# Patient Record
Sex: FEMALE | Race: WHITE | NOT HISPANIC OR LATINO | Employment: FULL TIME | URBAN - METROPOLITAN AREA
[De-identification: names, ages, dates, MRNs, and addresses within clinical notes are randomized per-mention and may not be internally consistent; named-entity substitution may affect disease eponyms.]

---

## 2017-10-08 ENCOUNTER — GENERIC CONVERSION - ENCOUNTER (OUTPATIENT)
Dept: OTHER | Facility: OTHER | Age: 52
End: 2017-10-08

## 2018-01-10 NOTE — PROGRESS NOTES
Assessment    1  Dysuria (788 1) (R30 0)    Plan  Dysuria    · Ciprofloxacin HCl - 500 MG Oral Tablet; TAKE 1 TABLET TWICE DAILY   · Phenazopyridine HCl - 200 MG Oral Tablet; Take 1 three times daily as needed for  burning    Discussion/Summary    Archie Banks has dysuria and symptoms consistent with UTI  I have her cipro for 3 days and pyridium for the discomfort  She is to follow up with her PCP or go to urgent care if her sx do not improve or worsen  Possible side effects of new medications were reviewed with the patient/guardian today  The treatment plan was reviewed with the patient/guardian  The patient/guardian understands and agrees with the treatment plan     Follow Up with your Primary Care Provider in 3 days  If your symptoms worsen follow up at the nearest [facility] Emergency Room  History of Present Illness  Archie Banks has been experiencing dysuria, urgency and frequency for the past several hours  She is working and will not be able to be seen at her PCP, she is quite uncomfortable  She states this presentation of symptoms is consistent with past UTI's  She denies fever, chills, nausea, vomiting, hematuria  No flank pain  Review of Systems    Constitutional: No fever, no chills, feels well, no tiredness, no recent weight gain or loss  Cardiovascular: no complaints of slow or fast heart rate, no chest pain, no palpitations, no leg claudication or lower extremity edema  Respiratory: no complaints of shortness of breath, no wheezing, no dyspnea on exertion, no orthopnea or PND  Gastrointestinal: no complaints of abdominal pain, no constipation, no nausea or diarrhea, no vomiting, no bloody stools  Genitourinary: as noted in HPI  Musculoskeletal: no complaints of arthralgia, no myalgia, no joint swelling or stiffness, no limb pain or swelling  Active Problems    1  Acute bronchitis (466 0) (J20 9)   2  Cough (786 2) (R05)    Past Medical History    1   Acute sinusitis (461 9) (J01 90)   2  History of urinary tract infection (V13 02) (Z87 440)   3  No pertinent past medical history    Social History    · Never a smoker    Surgical History    1  Denied: History Of Prior Surgery    Current Meds   1  Levofloxacin 750 MG Oral Tablet; TAKE 1 TABLET ONCE DAILY; Therapy: [id number] to (Evaluate:13Jan2016)  Requested for: [id number]; Last   Rx:06Jan2016 Ordered   2  Ventolin  (90 Base) MCG/ACT Inhalation Aerosol Solution; INHALE 2 PUFFS   EVERY 4 HOURS AS NEEDED; Therapy: [id number] to (Last Rx:06Jan2016)  Requested for: [id number] Ordered    Allergies    1  No Known Drug Allergies    Observations Made  NAD, No CVAT with self percussion        Signatures   Electronically signed by : Emily Cisneros DO; Aug 26 2016  4:10PM EST                       (Author)

## 2018-01-16 NOTE — PROGRESS NOTES
Assessment    1  Acute UTI (599 0) (N39 0)    Plan  Acute UTI    · Ciprofloxacin HCl - 250 MG Oral Tablet; TAKE 1 TABLET EVERY 12 HOURS DAILY   · Phenazopyridine HCl - 100 MG Oral Tablet; TAKE 1 TABLET 3 TIMES DAILY AFTER  MEALS    Discussion/Summary    I prescribed Cipro 250mg bid for 3 days, Pyridium 100mg TID for 3 days  i advised the patient to follow up with PCP if symptoms do not improve within 2 or 3 days  Chief Complaint  UTI      History of Present Illness  E-visit  Patient complaining of UTI symptoms since last night  Describes symptoms of urgency and dysuria  Denies any bladder pain over lower back pain  Denies any fever chills  This morning she took some over-the-counter azo tablet with minimal improvement  Review of Systems    Constitutional: No fever, no chills, feels well, no tiredness, no recent weight gain or loss  Genitourinary: dysuria  Active Problems    1  Acute bronchitis (466 0) (J20 9)   2  Cough (786 2) (R05)   3  Dysuria (788 1) (R30 0)    Past Medical History    1  Acute sinusitis (461 9) (J01 90)   2  History of urinary tract infection (V13 02) (Z87 440)   3  No pertinent past medical history    The active problems and past medical history were reviewed and updated today  Social History    · Never a smoker    Surgical History    1  Denied: History Of Prior Surgery    Current Meds   1  Ciprofloxacin HCl - 500 MG Oral Tablet; TAKE 1 TABLET TWICE DAILY; Therapy: 26Aug2016 to (Evaluate:29Aug2016)  Requested for: 26Aug2016; Last   Rx:26Aug2016 Ordered   2  LevoFLOXacin 750 MG Oral Tablet; TAKE 1 TABLET ONCE DAILY; Therapy: 34DYI8814 to (Evaluate:13Jan2016)  Requested for: 95VIN1884; Last   Rx:06Jan2016 Ordered   3  Phenazopyridine HCl - 200 MG Oral Tablet; Take 1 three times daily as needed for   burning; Therapy: 69Oya1888 to (Evaluate:30Aug2016)  Requested for: 24Tuo4298; Last   Rx:26Aug2016 Ordered   4   Ventolin  (90 Base) MCG/ACT Inhalation Aerosol Solution; INHALE 2 PUFFS   EVERY 4 HOURS AS NEEDED; Therapy: 53VWW5792 to (Last Rx:06Jan2016)  Requested for: 21LMV0626 Ordered    The medication list was reviewed and updated today  Allergies    1   No Known Drug Allergies    Signatures   Electronically signed by : FEDERICA Oconnor ; Oct  8 2017 10:07AM EST                       (Author)

## 2018-02-08 ENCOUNTER — OFFICE VISIT (OUTPATIENT)
Dept: URGENT CARE | Facility: CLINIC | Age: 53
End: 2018-02-08
Payer: COMMERCIAL

## 2018-02-08 VITALS
OXYGEN SATURATION: 99 % | RESPIRATION RATE: 18 BRPM | DIASTOLIC BLOOD PRESSURE: 100 MMHG | SYSTOLIC BLOOD PRESSURE: 150 MMHG | HEART RATE: 110 BPM | TEMPERATURE: 98.6 F

## 2018-02-08 DIAGNOSIS — J01.90 ACUTE SINUSITIS, RECURRENCE NOT SPECIFIED, UNSPECIFIED LOCATION: ICD-10-CM

## 2018-02-08 DIAGNOSIS — H66.93 BILATERAL OTITIS MEDIA, UNSPECIFIED OTITIS MEDIA TYPE: Primary | ICD-10-CM

## 2018-02-08 PROCEDURE — S9088 SERVICES PROVIDED IN URGENT: HCPCS | Performed by: NURSE PRACTITIONER

## 2018-02-08 PROCEDURE — 99213 OFFICE O/P EST LOW 20 MIN: CPT | Performed by: NURSE PRACTITIONER

## 2018-02-08 RX ORDER — AMOXICILLIN AND CLAVULANATE POTASSIUM 875; 125 MG/1; MG/1
1 TABLET, FILM COATED ORAL 2 TIMES DAILY
Qty: 14 TABLET | Refills: 0 | Status: SHIPPED | OUTPATIENT
Start: 2018-02-08 | End: 2018-02-15

## 2018-02-08 RX ORDER — FLUTICASONE PROPIONATE 50 MCG
1 SPRAY, SUSPENSION (ML) NASAL DAILY
Qty: 16 G | Refills: 0 | Status: SHIPPED | OUTPATIENT
Start: 2018-02-08 | End: 2019-01-09 | Stop reason: SDUPTHER

## 2018-02-08 NOTE — PROGRESS NOTES
Assessment/Plan     Diagnoses and all orders for this visit:    Bilateral otitis media, unspecified otitis media type  -     amoxicillin-clavulanate (AUGMENTIN) 875-125 mg per tablet; Take 1 tablet by mouth 2 (two) times a day for 7 days  -     fluticasone (FLONASE) 50 mcg/act nasal spray; 1 spray into each nostril daily    Acute sinusitis, recurrence not specified, unspecified location  -     amoxicillin-clavulanate (AUGMENTIN) 875-125 mg per tablet; Take 1 tablet by mouth 2 (two) times a day for 7 days  -     fluticasone (FLONASE) 50 mcg/act nasal spray; 1 spray into each nostril daily        Subjective:     Patient ID: Loretta Michaud is a 47 y o  female  Chief Complaint:    Earache    Chronicity: 2 days  The problem occurs constantly  The problem has been unchanged  Maximum temperature: Has been running a temp at home  up to 101 0  Took Tylenol beofre coming here  Associated symptoms include coughing, headaches and a sore throat  She has tried acetaminophen for the symptoms  Headache    Associated symptoms include coughing, ear pain and a sore throat  No past medical history on file  No past surgical history on file  No family history on file  Review of Systems   Constitutional: Negative  HENT: Positive for congestion, ear pain and sore throat  Eyes: Negative  Respiratory: Positive for cough  Cardiovascular: Negative  Gastrointestinal: Negative  Genitourinary: Negative  Musculoskeletal: Negative  Neurological: Positive for headaches  Psychiatric/Behavioral: Negative  Objective:    BP (!) 168/108   Pulse (!) 134   Temp 98 6 °F (37 °C)   Resp 18   SpO2 99%     Physical Exam   Constitutional: She is oriented to person, place, and time  She appears well-developed and well-nourished  No distress  HENT:   Head: Normocephalic and atraumatic  Right Ear: Tympanic membrane is erythematous and bulging  Left Ear: Tympanic membrane is erythematous and bulging  Nose: Mucosal edema and rhinorrhea present  Mouth/Throat: Posterior oropharyngeal erythema present  No oropharyngeal exudate  Eyes: Conjunctivae and EOM are normal  Pupils are equal, round, and reactive to light  Neck: Normal range of motion  Neck supple  Cardiovascular: Regular rhythm and normal heart sounds  Pulmonary/Chest: Effort normal  She has no wheezes  She has no rales  Abdominal: Soft  Bowel sounds are normal    Lymphadenopathy:     She has cervical adenopathy  Neurological: She is alert and oriented to person, place, and time  She has normal reflexes  Skin: Skin is warm and dry  No rash noted  Psychiatric: She has a normal mood and affect  Nursing note and vitals reviewed  Patient Instructions   Follow up with PCP in 48-72 hours if no significant improvement or if symptoms worsen

## 2018-06-06 ENCOUNTER — OFFICE VISIT (OUTPATIENT)
Dept: INTERNAL MEDICINE CLINIC | Facility: CLINIC | Age: 53
End: 2018-06-06
Payer: COMMERCIAL

## 2018-06-06 ENCOUNTER — TRANSCRIBE ORDERS (OUTPATIENT)
Dept: LAB | Facility: CLINIC | Age: 53
End: 2018-06-06

## 2018-06-06 ENCOUNTER — APPOINTMENT (OUTPATIENT)
Dept: LAB | Facility: CLINIC | Age: 53
End: 2018-06-06
Payer: COMMERCIAL

## 2018-06-06 VITALS
DIASTOLIC BLOOD PRESSURE: 92 MMHG | BODY MASS INDEX: 32.59 KG/M2 | WEIGHT: 195.6 LBS | SYSTOLIC BLOOD PRESSURE: 154 MMHG | HEART RATE: 114 BPM | TEMPERATURE: 97.7 F | HEIGHT: 65 IN | OXYGEN SATURATION: 97 %

## 2018-06-06 DIAGNOSIS — I10 ESSENTIAL HYPERTENSION: Primary | ICD-10-CM

## 2018-06-06 DIAGNOSIS — J30.9 ALLERGIC RHINITIS, UNSPECIFIED SEASONALITY, UNSPECIFIED TRIGGER: ICD-10-CM

## 2018-06-06 DIAGNOSIS — E78.2 MIXED HYPERLIPIDEMIA: ICD-10-CM

## 2018-06-06 DIAGNOSIS — Z13.6 SCREENING FOR CARDIOVASCULAR CONDITION: ICD-10-CM

## 2018-06-06 DIAGNOSIS — I10 ESSENTIAL HYPERTENSION: ICD-10-CM

## 2018-06-06 DIAGNOSIS — Z12.11 SCREENING FOR COLON CANCER: ICD-10-CM

## 2018-06-06 DIAGNOSIS — Z13.1 SCREENING FOR DIABETES MELLITUS: ICD-10-CM

## 2018-06-06 PROBLEM — F41.9 ANXIETY: Status: ACTIVE | Noted: 2018-06-06

## 2018-06-06 LAB
ALBUMIN SERPL BCP-MCNC: 3.7 G/DL (ref 3.5–5)
ALP SERPL-CCNC: 96 U/L (ref 46–116)
ALT SERPL W P-5'-P-CCNC: 22 U/L (ref 12–78)
ANION GAP SERPL CALCULATED.3IONS-SCNC: 7 MMOL/L (ref 4–13)
AST SERPL W P-5'-P-CCNC: 14 U/L (ref 5–45)
BASOPHILS # BLD AUTO: 0.05 THOUSANDS/ΜL (ref 0–0.1)
BASOPHILS NFR BLD AUTO: 1 % (ref 0–1)
BILIRUB SERPL-MCNC: 0.99 MG/DL (ref 0.2–1)
BUN SERPL-MCNC: 19 MG/DL (ref 5–25)
CALCIUM SERPL-MCNC: 9.5 MG/DL (ref 8.3–10.1)
CHLORIDE SERPL-SCNC: 105 MMOL/L (ref 100–108)
CHOLEST SERPL-MCNC: 222 MG/DL (ref 50–200)
CO2 SERPL-SCNC: 26 MMOL/L (ref 21–32)
CREAT SERPL-MCNC: 1.13 MG/DL (ref 0.6–1.3)
EOSINOPHIL # BLD AUTO: 0.11 THOUSAND/ΜL (ref 0–0.61)
EOSINOPHIL NFR BLD AUTO: 2 % (ref 0–6)
ERYTHROCYTE [DISTWIDTH] IN BLOOD BY AUTOMATED COUNT: 12.7 % (ref 11.6–15.1)
EST. AVERAGE GLUCOSE BLD GHB EST-MCNC: 123 MG/DL
GFR SERPL CREATININE-BSD FRML MDRD: 56 ML/MIN/1.73SQ M
GLUCOSE P FAST SERPL-MCNC: 93 MG/DL (ref 65–99)
HBA1C MFR BLD: 5.9 % (ref 4.2–6.3)
HCT VFR BLD AUTO: 46.9 % (ref 34.8–46.1)
HDLC SERPL-MCNC: 62 MG/DL (ref 40–60)
HGB BLD-MCNC: 15.4 G/DL (ref 11.5–15.4)
IMM GRANULOCYTES # BLD AUTO: 0.01 THOUSAND/UL (ref 0–0.2)
IMM GRANULOCYTES NFR BLD AUTO: 0 % (ref 0–2)
LDLC SERPL CALC-MCNC: 142 MG/DL (ref 0–100)
LYMPHOCYTES # BLD AUTO: 1.21 THOUSANDS/ΜL (ref 0.6–4.47)
LYMPHOCYTES NFR BLD AUTO: 18 % (ref 14–44)
MCH RBC QN AUTO: 28.9 PG (ref 26.8–34.3)
MCHC RBC AUTO-ENTMCNC: 32.8 G/DL (ref 31.4–37.4)
MCV RBC AUTO: 88 FL (ref 82–98)
MONOCYTES # BLD AUTO: 0.48 THOUSAND/ΜL (ref 0.17–1.22)
MONOCYTES NFR BLD AUTO: 7 % (ref 4–12)
NEUTROPHILS # BLD AUTO: 4.82 THOUSANDS/ΜL (ref 1.85–7.62)
NEUTS SEG NFR BLD AUTO: 72 % (ref 43–75)
NONHDLC SERPL-MCNC: 160 MG/DL
NRBC BLD AUTO-RTO: 0 /100 WBCS
PLATELET # BLD AUTO: 406 THOUSANDS/UL (ref 149–390)
PMV BLD AUTO: 10.8 FL (ref 8.9–12.7)
POTASSIUM SERPL-SCNC: 4 MMOL/L (ref 3.5–5.3)
PROT SERPL-MCNC: 8.4 G/DL (ref 6.4–8.2)
RBC # BLD AUTO: 5.32 MILLION/UL (ref 3.81–5.12)
SODIUM SERPL-SCNC: 138 MMOL/L (ref 136–145)
TRIGL SERPL-MCNC: 91 MG/DL
TSH SERPL DL<=0.05 MIU/L-ACNC: 0.28 UIU/ML (ref 0.36–3.74)
WBC # BLD AUTO: 6.68 THOUSAND/UL (ref 4.31–10.16)

## 2018-06-06 PROCEDURE — 99203 OFFICE O/P NEW LOW 30 MIN: CPT | Performed by: FAMILY MEDICINE

## 2018-06-06 PROCEDURE — 80053 COMPREHEN METABOLIC PANEL: CPT

## 2018-06-06 PROCEDURE — 84443 ASSAY THYROID STIM HORMONE: CPT

## 2018-06-06 PROCEDURE — 80061 LIPID PANEL: CPT

## 2018-06-06 PROCEDURE — 3008F BODY MASS INDEX DOCD: CPT | Performed by: FAMILY MEDICINE

## 2018-06-06 PROCEDURE — 1036F TOBACCO NON-USER: CPT | Performed by: FAMILY MEDICINE

## 2018-06-06 PROCEDURE — 85025 COMPLETE CBC W/AUTO DIFF WBC: CPT

## 2018-06-06 PROCEDURE — 36415 COLL VENOUS BLD VENIPUNCTURE: CPT

## 2018-06-06 PROCEDURE — 83036 HEMOGLOBIN GLYCOSYLATED A1C: CPT

## 2018-06-06 NOTE — PROGRESS NOTES
Assessment/Plan:    No problem-specific Assessment & Plan notes found for this encounter  Diagnoses and all orders for this visit:    Essential hypertension  -     CBC and differential; Future  -     Comprehensive metabolic panel; Future  -     Lipid panel; Future  -     TSH, 3rd generation; Future    Mixed hyperlipidemia  -     CBC and differential; Future  -     Comprehensive metabolic panel; Future  -     Lipid panel; Future  -     TSH, 3rd generation; Future    Allergic rhinitis, unspecified seasonality, unspecified trigger  -     CBC and differential; Future  -     TSH, 3rd generation; Future    Screening for diabetes mellitus  -     HEMOGLOBIN A1C W/ EAG ESTIMATION; Future    Screening for colon cancer  -     Cologuard    Screening for cardiovascular condition  -     Lipid panel; Future        Orders and recommendations as noted above  Blood pressure did decreased somewhat  Still remains elevated  Advised her to watch her salt intake  Will have her follow up for a blood pressure check in about 3-4 weeks  Discussed with her starting blood pressure medication if her blood pressure remains elevated  Slip given for laboratory testing  Cholesterol had been significantly elevated in the past   Discussed with her watching her diet  Increase fiber  Increase activity level  If cholesterol remains elevated, should consider medication for this  Discussed over-the-counter options for allergies  Avoid decongestants with elevated blood pressure  Discussed screening testing  She will consider mammogram in the future  She may consider gyn exam in the future  She declines colonoscopy at this point  The script given for colo guard  Discussed methods for stress relief  Will have her follow up for a blood pressure check in about 3-4 weeks and will likely have her follow-up for routine visit in about 6 months depending on her repeat blood pressure check as well as her laboratory testing      Subjective: Patient ID: Ella Lion is a 47 y o  female  She presents to establish as a new patient  She has not followed up regularly with a physician for years  She has been under a lot more stress over the last few years  Her father  about 8 years ago and she has had issues with her daughter and likely drug addiction  She feels this has a role to play in her blood pressure being somewhat elevated  Her blood pressure was elevated at an urgent care visit a few months ago  Since that point has been trying to watch her diet more closely  Trying to exercise more  Trying to decrease salt in her diet  Had drink a lot of soda and has trying to cut back on this  Appetite has been generally good  Denies any nausea, vomiting, or diarrhea  Denies any changes in bowel movements  Has not had a colonoscopy and is unsure whether she wishes to proceed with this in the future  She states that she is trying to take baby steps    May consider mammogram and colonoscopy in the future  Does wish to get laboratory testing done  Had significantly elevated cholesterol in the past   Denies any chest pain or palpitations  Denies any significant shortness of breath  Has some allergy symptoms with runny nose and postnasal drip at times  Occasional cough  Denies any fevers or chills  Does not take vitamins on a regular basis  Sometimes uses over-the-counter nasal sprays  Vision has been stable  Denies any hearing issues  Denies any headaches or localized weakness  Denies any abdominal pain  Denies any urinary issues  The following portions of the patient's history were reviewed and updated as appropriate:   She  has no past medical history on file    She   Patient Active Problem List    Diagnosis Date Noted    Screening for cardiovascular condition 2018    Screening for diabetes mellitus 2018    Mixed hyperlipidemia 2018    Essential hypertension 2018    Screening for colon cancer 06/06/2018    Anxiety 06/06/2018    Allergic rhinitis 06/06/2018     She  has a past surgical history that includes No past surgeries  Her family history includes Heart disease in her mother; Other in her father  She  reports that she has never smoked  She has never used smokeless tobacco  She reports that she does not drink alcohol or use drugs  Current Outpatient Prescriptions   Medication Sig Dispense Refill    fluticasone (FLONASE) 50 mcg/act nasal spray 1 spray into each nostril daily 16 g 0     No current facility-administered medications for this visit  Current Outpatient Prescriptions on File Prior to Visit   Medication Sig    fluticasone (FLONASE) 50 mcg/act nasal spray 1 spray into each nostril daily     No current facility-administered medications on file prior to visit  She has No Known Allergies       Review of Systems   Constitutional: Negative for activity change, appetite change, chills and fever  HENT: Negative for congestion and rhinorrhea  Eyes: Negative for visual disturbance  Respiratory: Negative for chest tightness and shortness of breath  Cardiovascular: Negative for chest pain and palpitations  Gastrointestinal: Negative for abdominal pain, blood in stool, diarrhea, nausea and vomiting  Endocrine: Negative for polydipsia, polyphagia and polyuria  Genitourinary: Negative for dysuria, frequency and urgency  Musculoskeletal: Negative for gait problem  Skin: Negative for color change  Neurological: Negative for dizziness and headaches  Hematological: Does not bruise/bleed easily  Psychiatric/Behavioral: Negative for confusion and sleep disturbance  The patient is nervous/anxious  Objective:      /92   Pulse (!) 114   Temp 97 7 °F (36 5 °C) (Temporal)   Ht 5' 5" (1 651 m)   Wt 88 7 kg (195 lb 9 6 oz)   SpO2 97%   BMI 32 55 kg/m²          Physical Exam   Constitutional: She is oriented to person, place, and time  She is cooperative   No distress  HENT:   Head: Normocephalic and atraumatic  Mouth/Throat: Oropharynx is clear and moist    Boggy nasal mucosa with clear nasal discharge   Eyes: Conjunctivae are normal  Pupils are equal, round, and reactive to light  Right eye exhibits no discharge  Left eye exhibits no discharge  Cardiovascular: Normal rate, regular rhythm and normal heart sounds  Exam reveals no gallop and no friction rub  No murmur heard  Pulmonary/Chest: No respiratory distress  She has no wheezes  She has no rales  Abdominal: Bowel sounds are normal  She exhibits no distension  There is no tenderness  Musculoskeletal: She exhibits no edema  Lymphadenopathy:     She has no cervical adenopathy  Neurological: She is alert and oriented to person, place, and time  Skin: Skin is warm and dry  Psychiatric: She has a normal mood and affect  Her behavior is normal    Nursing note and vitals reviewed

## 2018-07-18 ENCOUNTER — OFFICE VISIT (OUTPATIENT)
Dept: INTERNAL MEDICINE CLINIC | Facility: CLINIC | Age: 53
End: 2018-07-18
Payer: COMMERCIAL

## 2018-07-18 VITALS — SYSTOLIC BLOOD PRESSURE: 142 MMHG | DIASTOLIC BLOOD PRESSURE: 80 MMHG

## 2018-07-18 DIAGNOSIS — I10 ESSENTIAL HYPERTENSION: Primary | ICD-10-CM

## 2018-07-18 PROCEDURE — 99211 OFF/OP EST MAY X REQ PHY/QHP: CPT | Performed by: FAMILY MEDICINE

## 2018-08-01 NOTE — PROGRESS NOTES
Blood pressure improved somewhat  Watch salt intake  Recheck blood pressure in about a month  If is elevated, would consider medication at that point

## 2019-01-09 ENCOUNTER — OFFICE VISIT (OUTPATIENT)
Dept: INTERNAL MEDICINE CLINIC | Facility: CLINIC | Age: 54
End: 2019-01-09
Payer: COMMERCIAL

## 2019-01-09 VITALS
WEIGHT: 190 LBS | HEART RATE: 114 BPM | SYSTOLIC BLOOD PRESSURE: 136 MMHG | TEMPERATURE: 97.3 F | OXYGEN SATURATION: 97 % | BODY MASS INDEX: 31.65 KG/M2 | HEIGHT: 65 IN | DIASTOLIC BLOOD PRESSURE: 70 MMHG

## 2019-01-09 DIAGNOSIS — J30.9 ALLERGIC RHINITIS, UNSPECIFIED SEASONALITY, UNSPECIFIED TRIGGER: Primary | ICD-10-CM

## 2019-01-09 DIAGNOSIS — I10 ESSENTIAL HYPERTENSION: ICD-10-CM

## 2019-01-09 DIAGNOSIS — F41.9 ANXIETY: ICD-10-CM

## 2019-01-09 DIAGNOSIS — E78.2 MIXED HYPERLIPIDEMIA: ICD-10-CM

## 2019-01-09 PROCEDURE — 3078F DIAST BP <80 MM HG: CPT | Performed by: FAMILY MEDICINE

## 2019-01-09 PROCEDURE — 99214 OFFICE O/P EST MOD 30 MIN: CPT | Performed by: FAMILY MEDICINE

## 2019-01-09 PROCEDURE — 3008F BODY MASS INDEX DOCD: CPT | Performed by: FAMILY MEDICINE

## 2019-01-09 PROCEDURE — 3075F SYST BP GE 130 - 139MM HG: CPT | Performed by: FAMILY MEDICINE

## 2019-01-09 RX ORDER — FLUTICASONE PROPIONATE 50 MCG
1 SPRAY, SUSPENSION (ML) NASAL DAILY
Qty: 16 G | Refills: 5 | Status: SHIPPED | OUTPATIENT
Start: 2019-01-09 | End: 2021-09-20 | Stop reason: SDUPTHER

## 2019-01-09 NOTE — PROGRESS NOTES
Assessment/Plan:    Allergic rhinitis  Will refill her Flonase  Hopefully this will control her allergy symptoms  Advised her to call or follow-up if symptoms worsen or persist     Essential hypertension  Blood pressure has been variable  Discussed with her that she likely requires a blood pressure medication but she wishes to avoid this if possible  She feels that most of her labile blood pressures have been related to stress  Advised her to watch salt intake  Follow blood pressures a few times a week at home and contact the office if they remain elevated  Anxiety  Has had a lot of stressors recently  Discussed with her stress relief  She declines medication for this  Watch for any worsening  Mixed hyperlipidemia  Previous cholesterol reading was elevated  Discussed with her goal of an LDL less than 100  Watch diet more closely  Increase activity level  Increase fiber in diet  Slip given for repeat laboratory testing  Diagnoses and all orders for this visit:    Allergic rhinitis, unspecified seasonality, unspecified trigger  -     fluticasone (FLONASE) 50 mcg/act nasal spray; 1 spray into each nostril daily    Essential hypertension  -     CBC and differential; Future  -     Comprehensive metabolic panel; Future  -     Lipid panel; Future    Anxiety    Mixed hyperlipidemia  -     Comprehensive metabolic panel; Future  -     Lipid panel; Future  -     TSH, 3rd generation; Future        Orders recommendations as noted above  She wants to hold on the mammogram and colorectal cancer screening at present  She will consider this over the next 6 months or so  Methods for stress relief discussed  She is up-to-date on her flu shot  Will have her follow up in about 6 months or sooner if needed  Subjective:      Patient ID: Erika Kauffman is a 47 y o  female  She presents for routine follow-up  Has had a very stressful morning    They are remodeling their kitchen and the climbing was being worked with  She had issue with water from the bathroom upstairs coming into the kitchen below  She then had an issue with her dog and a skunk  She feels this has affected her blood pressure somewhat this morning  She actually felt that it was going to be higher  Has had increased issues recently with increased nasal congestion and postnasal drip  She feels this is because she ran out of her Flonase  Denies any recent fevers or chills  Denies any recent ear pain  Did have an issue back in September and symptoms slowly improved  Checks blood pressure occasionally at home  Has been variable  Denies any headaches or localized weakness  Denies any chest pain or palpitations  Denies any significant shortness of breath  Sleeping relatively well  Appetite has been good  Denies any nausea, vomiting, or diarrhea  Denies any changes in bowel movements  Denies any blood or mucus in bowel movements  Vision has been stable  Denies any urinary issues  The following portions of the patient's history were reviewed and updated as appropriate:   She  has no past medical history on file  She   Patient Active Problem List    Diagnosis Date Noted    Screening for cardiovascular condition 06/06/2018    Screening for diabetes mellitus 06/06/2018    Mixed hyperlipidemia 06/06/2018    Essential hypertension 06/06/2018    Screening for colon cancer 06/06/2018    Anxiety 06/06/2018    Allergic rhinitis 06/06/2018     She  has a past surgical history that includes No past surgeries  Her family history includes Heart disease in her mother; Other in her father  She  reports that she has never smoked  She has never used smokeless tobacco  She reports that she does not drink alcohol or use drugs    Current Outpatient Prescriptions   Medication Sig Dispense Refill    fluticasone (FLONASE) 50 mcg/act nasal spray 1 spray into each nostril daily 16 g 5     No current facility-administered medications for this visit  No current outpatient prescriptions on file prior to visit  No current facility-administered medications on file prior to visit  She has No Known Allergies       Review of Systems   Constitutional: Negative for activity change, appetite change, chills and fever  HENT: Positive for congestion, postnasal drip and rhinorrhea  Eyes: Negative for visual disturbance  Respiratory: Negative for chest tightness and shortness of breath  Cardiovascular: Negative for chest pain and palpitations  Gastrointestinal: Negative for abdominal pain, blood in stool, diarrhea, nausea and vomiting  Endocrine: Negative for polydipsia, polyphagia and polyuria  Genitourinary: Negative for dysuria, frequency and urgency  Musculoskeletal: Negative for gait problem  Skin: Negative for color change  Neurological: Negative for dizziness and headaches  Hematological: Does not bruise/bleed easily  Psychiatric/Behavioral: Negative for confusion and sleep disturbance  The patient is nervous/anxious  Objective:      /70 (BP Location: Left arm, Patient Position: Sitting, Cuff Size: Large)   Pulse (!) 114   Temp (!) 97 3 °F (36 3 °C) (Temporal)   Ht 5' 5" (1 651 m)   Wt 86 2 kg (190 lb)   SpO2 97%   BMI 31 62 kg/m²          Physical Exam   Constitutional: She is oriented to person, place, and time  She is cooperative  No distress  HENT:   Head: Normocephalic and atraumatic  Mouth/Throat: Oropharynx is clear and moist    Boggy nasal mucosa with clear nasal discharge; no pharyngeal erythema   Eyes: Pupils are equal, round, and reactive to light  Conjunctivae are normal  Right eye exhibits no discharge  Left eye exhibits no discharge  Cardiovascular: Normal rate, regular rhythm and normal heart sounds  Exam reveals no gallop and no friction rub  No murmur heard  Pulmonary/Chest: No respiratory distress  She has no wheezes  She has no rales     Abdominal: Bowel sounds are normal  She exhibits no distension  There is no tenderness  Musculoskeletal: She exhibits no edema  Lymphadenopathy:     She has no cervical adenopathy  Neurological: She is alert and oriented to person, place, and time  Skin: Skin is warm and dry  Psychiatric: She has a normal mood and affect  Her behavior is normal    Nursing note and vitals reviewed  Below is the patient's most recent value for Albumin, ALT, AST, BUN, Calcium, Chloride, Cholesterol, CO2, Creatinine, GFR, Glucose, HDL, Hematocrit, Hemoglobin, Hemoglobin A1C, LDL, Magnesium, Phosphorus, Platelets, Potassium, PSA, Sodium, Triglycerides, and WBC  Lab Results   Component Value Date    ALT 22 06/06/2018    AST 14 06/06/2018    BUN 19 06/06/2018    CALCIUM 9 5 06/06/2018     06/06/2018    CHOL 226 08/17/2015    CO2 26 06/06/2018    CREATININE 1 13 06/06/2018    HDL 62 (H) 06/06/2018    HCT 46 9 (H) 06/06/2018    HGB 15 4 06/06/2018    HGBA1C 5 9 06/06/2018     (H) 06/06/2018    K 4 0 06/06/2018    TRIG 91 06/06/2018    WBC 6 68 06/06/2018     Note: for a comprehensive list of the patient's lab results, access the Results Review activity

## 2019-01-09 NOTE — PATIENT INSTRUCTIONS
Allergic Rhinitis   AMBULATORY CARE:   Allergic rhinitis , or hay fever, is swelling of the inside of your nose  The swelling is a reaction to allergens in the air  An allergen can be anything that causes an allergic reaction  Allergies to weeds, grass, trees, or mold often cause seasonal allergic rhinitis  Indoor dust mites, cockroaches, pet dander, or mold can also cause allergic rhinitis  Common signs and symptoms include the following:   · Sneezing    · Nasal congestion    · Runny nose    · Itchy nose, eyes, or mouth    · Red, watery eyes    · Postnasal drip (nasal drainage down the back of your throat)    · Cough or frequent throat clearing    · Feeling tired or lethargic    · Dark circles under your eyes  Call 911 for the following:   · You have chest pain or shortness of breath  Seek care immediately if:   · You have severe pain  · You cough up blood  Contact your healthcare provider if:   · You have a fever  · You have ear or sinus pain, or a headache  · Your symptoms get worse, even after treatment  · You have yellow, green, brown, or bloody mucus coming from your nose  · Your nose is bleeding or you have pain inside your nose  · You have trouble sleeping because of your symptoms  · You have questions or concerns about your condition or care  Treatment:   · Antihistamines  help reduce itching, sneezing, and a runny nose  Some antihistamines can make you sleepy  · Nasal steroids  help decrease inflammation in your nose  · Decongestants  help clear your stuffy nose  · Immunotherapy  may be needed if your symptoms are severe or other treatments do not work  Immunotherapy is used to inject an allergen into your skin  At first, the therapy contains tiny amounts of the allergen  Your healthcare provider will slowly increase the amount of allergen  This may help your body be less sensitive to the allergen and stop reacting to it   You may need immunotherapy for weeks or longer  Manage allergic rhinitis:  The best way to manage allergic rhinitis is to avoid allergens that can trigger your symptoms  Any of the following may help decrease your symptoms:  · Rinse your nose and sinuses  with a salt water solution or use a salt water nasal spray  This will help thin the mucus in your nose and rinse away pollen and dirt  It will also help reduce swelling so you can breathe normally  Ask your healthcare provider how often to rinse your nose  · Reduce exposure to dust mites  Wash sheets and towels in hot water every week  Cover your pillows and mattresses with allergen-free covers  Limit the number of stuffed animals and soft toys your child has  Wash your child's toys in hot water regularly  Vacuum weekly and use a vacuum  with an air filter  If possible, get rid of carpets and curtains  These collect dust and dust mites  · Reduce exposure to pollen  Keep windows and doors closed in your house and car  Stay inside when air pollution or the pollen count is high  Run your air conditioner on recycle, and change air filters often  Shower and wash your hair before bed every night to rinse away pollen  · Reduce exposure to pet dander  If possible, do not keep cats, dogs, birds, or other pets  If you do keep pets in your home, keep them out of bedrooms and carpeted rooms  Bathe them often  · Reduce exposure to mold  Do not spend time in basements  Choose artificial plants instead of live plants  Keep your home's humidity at less than 45%  Do not have ponds or standing water in your home or yard  · Do not smoke  Avoid others who smoke  Ask your healthcare provider for information if you currently smoke and need help to quit  Follow up with your healthcare provider as directed:  Write down your questions so you remember to ask them during your visits     © 2017 [employer] Information is for End User's use only and may not be sold, redistributed or otherwise used for commercial purposes  All illustrations and images included in CareNotes® are the copyrighted property of [employer]  or [employer]  The above information is an  only  It is not intended as medical advice for individual conditions or treatments  Talk to your doctor, nurse or pharmacist before following any medical regimen to see if it is safe and effective for you

## 2019-01-20 NOTE — ASSESSMENT & PLAN NOTE
Previous cholesterol reading was elevated  Discussed with her goal of an LDL less than 100  Watch diet more closely  Increase activity level  Increase fiber in diet  Slip given for repeat laboratory testing

## 2019-01-20 NOTE — ASSESSMENT & PLAN NOTE
Will refill her Flonase  Hopefully this will control her allergy symptoms    Advised her to call or follow-up if symptoms worsen or persist

## 2019-01-20 NOTE — ASSESSMENT & PLAN NOTE
Has had a lot of stressors recently  Discussed with her stress relief  She declines medication for this  Watch for any worsening

## 2019-01-20 NOTE — ASSESSMENT & PLAN NOTE
Blood pressure has been variable  Discussed with her that she likely requires a blood pressure medication but she wishes to avoid this if possible  She feels that most of her labile blood pressures have been related to stress  Advised her to watch salt intake  Follow blood pressures a few times a week at home and contact the office if they remain elevated

## 2019-04-03 ENCOUNTER — OFFICE VISIT (OUTPATIENT)
Dept: INTERNAL MEDICINE CLINIC | Facility: CLINIC | Age: 54
End: 2019-04-03
Payer: COMMERCIAL

## 2019-04-03 VITALS
BODY MASS INDEX: 32.15 KG/M2 | OXYGEN SATURATION: 96 % | TEMPERATURE: 97.1 F | WEIGHT: 193 LBS | HEART RATE: 111 BPM | SYSTOLIC BLOOD PRESSURE: 138 MMHG | DIASTOLIC BLOOD PRESSURE: 70 MMHG | HEIGHT: 65 IN

## 2019-04-03 DIAGNOSIS — H65.01 NON-RECURRENT ACUTE SEROUS OTITIS MEDIA OF RIGHT EAR: Primary | ICD-10-CM

## 2019-04-03 DIAGNOSIS — J02.0 STREP PHARYNGITIS: ICD-10-CM

## 2019-04-03 PROCEDURE — 99213 OFFICE O/P EST LOW 20 MIN: CPT | Performed by: FAMILY MEDICINE

## 2019-04-03 PROCEDURE — 3008F BODY MASS INDEX DOCD: CPT | Performed by: FAMILY MEDICINE

## 2019-04-03 RX ORDER — AMOXICILLIN 875 MG/1
875 TABLET, COATED ORAL 2 TIMES DAILY
Qty: 20 TABLET | Refills: 0 | Status: SHIPPED | OUTPATIENT
Start: 2019-04-03 | End: 2019-04-13

## 2019-07-09 ENCOUNTER — OFFICE VISIT (OUTPATIENT)
Dept: INTERNAL MEDICINE CLINIC | Facility: CLINIC | Age: 54
End: 2019-07-09
Payer: COMMERCIAL

## 2019-07-09 VITALS
OXYGEN SATURATION: 98 % | HEART RATE: 104 BPM | WEIGHT: 193 LBS | DIASTOLIC BLOOD PRESSURE: 80 MMHG | SYSTOLIC BLOOD PRESSURE: 124 MMHG | HEIGHT: 65 IN | BODY MASS INDEX: 32.15 KG/M2 | TEMPERATURE: 98.1 F

## 2019-07-09 DIAGNOSIS — F32.9 REACTIVE DEPRESSION: ICD-10-CM

## 2019-07-09 DIAGNOSIS — F41.9 ANXIETY: ICD-10-CM

## 2019-07-09 DIAGNOSIS — Z12.11 SCREENING FOR COLON CANCER: ICD-10-CM

## 2019-07-09 DIAGNOSIS — J30.9 ALLERGIC RHINITIS, UNSPECIFIED SEASONALITY, UNSPECIFIED TRIGGER: Primary | ICD-10-CM

## 2019-07-09 DIAGNOSIS — E78.2 MIXED HYPERLIPIDEMIA: ICD-10-CM

## 2019-07-09 PROCEDURE — 99214 OFFICE O/P EST MOD 30 MIN: CPT | Performed by: FAMILY MEDICINE

## 2019-07-09 NOTE — PATIENT INSTRUCTIONS
Low Fat Diet   AMBULATORY CARE:   A low-fat diet  is an eating plan that is low in total fat, unhealthy fat, and cholesterol  You may need to follow a low-fat diet if you have trouble digesting or absorbing fat  You may also need to follow this diet if you have high cholesterol  You can also lower your cholesterol by increasing the amount of fiber in your diet  Soluble fiber is a type of fiber that helps to decrease cholesterol levels  Different types of fat in food:   · Limit unhealthy fats  A diet that is high in cholesterol, saturated fat, and trans fat may cause unhealthy cholesterol levels  Unhealthy cholesterol levels increase your risk of heart disease  ¨ Cholesterol:  Limit intake of cholesterol to less than 200 mg per day  Cholesterol is found in meat, eggs, and dairy  ¨ Saturated fat:  Limit saturated fat to less than 7% of your total daily calories  Ask your dietitian how many calories you need each day  Saturated fat is found in butter, cheese, ice cream, whole milk, and palm oil  Saturated fat is also found in meat, such as beef, pork, chicken skin, and processed meats  Processed meats include sausage, hot dogs, and bologna  ¨ Trans fat:  Avoid trans fat as much as possible  Trans fat is used in fried and baked foods  Foods that say trans fat free on the label may still have up to 0 5 grams of trans fat per serving  · Include healthy fats  Replace foods that are high in saturated and trans fat with foods high in healthy fats  This may help to decrease high cholesterol levels  ¨ Monounsaturated fats: These are found in avocados, nuts, and vegetable oils, such as olive, canola, and sunflower oil  ¨ Polyunsaturated fats: These can be found in vegetable oils, such as soybean or corn oil  Omega-3 fats can help to decrease the risk of heart disease  Omega-3 fats are found in fish, such as salmon, herring, trout, and tuna   Omega-3 fats can also be found in plant foods, such as walnuts, flaxseed, soybeans, and canola oil    Foods to limit or avoid:   · Grains:      ¨ Snacks that are made with partially hydrogenated oils, such as chips, regular crackers, and butter-flavored popcorn    ¨ High-fat baked goods, such as biscuits, croissants, doughnuts, pies, cookies, and pastries    · Dairy:      ¨ Whole milk, 2% milk, and yogurt and ice cream made with whole milk    ¨ Half and half creamer, heavy cream, and whipping cream    ¨ Cheese, cream cheese, and sour cream    · Meats and proteins:      ¨ High-fat cuts of meat (T-bone steak, regular hamburger, and ribs)    ¨ Fried meat, poultry (turkey and chicken), and fish    ¨ Poultry (chicken and turkey) with skin    ¨ Cold cuts (salami or bologna), hot dogs, smith, and sausage    ¨ Whole eggs and egg yolks    · Vegetables and fruits with added fat:      ¨ Fried vegetables or vegetables in butter or high-fat sauces, such as cream or cheese sauces    ¨ Fried fruit or fruit served with butter or cream    · Fats:      ¨ Butter, stick margarine, and shortening    ¨ Coconut, palm oil, and palm kernel oil  Foods to include:   · Grains:      ¨ Whole-grain breads, cereals, pasta, and brown rice    ¨ Low-fat crackers and pretzels    · Vegetables and fruits:      ¨ Fresh, frozen, or canned vegetables (no salt or low-sodium)    ¨ Fresh, frozen, dried, or canned fruit (canned in light syrup or fruit juice)    ¨ Avocado    · Low-fat dairy products:      ¨ Nonfat (skim) or 1% milk    ¨ Nonfat or low-fat cheese, yogurt, and cottage cheese    · Meats and proteins:      ¨ Chicken or turkey with no skin    ¨ Baked or broiled fish    ¨ Lean beef and pork (loin, round, extra lean hamburger)    ¨ Beans and peas, unsalted nuts, soy products    ¨ Egg whites and substitutes    ¨ Seeds and nuts    · Fats:      ¨ Unsaturated oil, such as canola, olive, peanut, soybean, or sunflower oil    ¨ Soft or liquid margarine and vegetable oil spread    ¨ Low-fat salad dressing  Other ways to decrease fat:   · Read food labels before you buy foods  Choose foods that have less than 30% of calories from fat  Choose low-fat or fat-free dairy products  Remember that fat free does not mean calorie free  These foods still contain calories, and too many calories can lead to weight gain  · Trim fat from meat and avoid fried food  Trim all visible fat from meat before you cook it  Remove the skin from poultry  Do not [place] meat, fish, or poultry  Bake, roast, boil, or broil these foods instead  Avoid fried foods  Eat a baked potato instead of [place] fries  Steam vegetables instead of sautéing them in butter  · Add less fat to foods  Use imitation smith bits on salads and baked potatoes instead of regular smith bits  Use fat-free or low-fat salad dressings instead of regular dressings  Use low-fat or nonfat butter-flavored topping instead of regular butter or margarine on popcorn and other foods  Ways to decrease fat in recipes:  Replace high-fat ingredients with low-fat or nonfat ones  This may cause baked goods to be drier than usual  You may need to use nonfat cooking spray on pans to prevent food from sticking  You also may need to change the amount of other ingredients, such as water, in the recipe  Try the following:  · Use low-fat or light margarine instead of regular margarine or shortening  · Use lean ground turkey breast or chicken, or lean ground beef (less than 5% fat) instead of hamburger  · Add 1 teaspoon of canola oil to 8 ounces of skim milk instead of using cream or half and half  · Use grated zucchini, carrots, or apples in breads instead of coconut  · Use blenderized, low-fat cottage cheese, plain tofu, or low-fat ricotta cheese instead of cream cheese  · Use 1 egg white and 1 teaspoon of canola oil, or use ¼ cup (2 ounces) of fat-free egg substitute instead of a whole egg       · Replace half of the oil that is called for in a recipe with applesauce when you bake  Use 3 tablespoons of cocoa powder and 1 tablespoon of canola oil instead of a square of baking chocolate  How to increase fiber:  Eat enough high-fiber foods to get 20 to 30 grams of fiber every day  Slowly increase your fiber intake to avoid stomach cramps, gas, and other problems  · Eat 3 ounces of whole-grain foods each day  An ounce is about 1 slice of bread  Eat whole-grain breads, such as whole-wheat bread  Whole wheat, whole-wheat flour, or other whole grains should be listed as the first ingredient on the food label  Replace white flour with whole-grain flour or use half of each in recipes  Whole-grain flour is heavier than white flour, so you may have to add more yeast or baking powder  · Eat a high-fiber cereal for breakfast   Oatmeal is a good source of soluble fiber  Look for cereals that have bran or fiber in the name  Choose whole-grain products, such as brown rice, barley, and whole-wheat pasta  · Eat more beans, peas, and lentils  For example, add beans to soups or salads  Eat at least 5 cups of fruits and vegetables each day  Eat fruits and vegetables with the peel because the peel is high in fiber  © 2017 [employer] Information is for End User's use only and may not be sold, redistributed or otherwise used for commercial purposes  All illustrations and images included in CareNotes® are the copyrighted property of [employer]  or [employer]  The above information is an  only  It is not intended as medical advice for individual conditions or treatments  Talk to your doctor, nurse or pharmacist before following any medical regimen to see if it is safe and effective for you  Heart Healthy Diet   AMBULATORY CARE:   A heart healthy diet  is an eating plan low in total fat, unhealthy fats, and sodium (salt)  A heart healthy diet helps decrease your risk for heart disease and stroke   Limit the amount of fat you eat to 25% to 35% of your total daily calories  Limit sodium to less than 2,300 mg each day  Healthy fats:  Healthy fats can help improve cholesterol levels  The risk for heart disease is decreased when cholesterol levels are normal  Choose healthy fats, such as the following:  · Unsaturated fat  is found in foods such as soybean, canola, olive, corn, and safflower oils  It is also found in soft tub margarine that is made with liquid vegetable oil  · Omega-3 fat  is found in certain fish, such as salmon, tuna, and trout, and in walnuts and flaxseed  Unhealthy fats:  Unhealthy fats can cause unhealthy cholesterol levels in your blood and increase your risk of heart disease  Limit unhealthy fats, such as the following:  · Cholesterol  is found in animal foods, such as eggs and lobster, and in dairy products made from whole milk  Limit cholesterol to less than 300 milligrams (mg) each day  You may need to limit cholesterol to 200 mg each day if you have heart disease  · Saturated fat  is found in meats, such as smith and hamburger  It is also found in chicken or turkey skin, whole milk, and butter  Limit saturated fat to less than 7% of your total daily calories  Limit saturated fat to less than 6% if you have heart disease or are at increased risk for it  · Trans fat  is found in packaged foods, such as potato chips and cookies  It is also in hard margarine, some fried foods, and shortening  Avoid trans fats as much as possible    Heart healthy foods and drinks to include:  Ask your dietitian or healthcare provider how many servings to have from each of the following food groups:  · Grains:      ¨ Whole-wheat breads, cereals, and pastas, and brown rice    ¨ Low-fat, low-sodium crackers and chips    · Vegetables:      ¨ Broccoli, green beans, green peas, and spinach    ¨ Collards, kale, and lima beans    ¨ Carrots, sweet potatoes, tomatoes, and peppers    ¨ Canned vegetables with no salt added    · Fruits:      ¨ Bananas, peaches, pears, and pineapple    ¨ Grapes, raisins, and dates    ¨ Oranges, tangerines, grapefruit, orange juice, and grapefruit juice    ¨ Apricots, mangoes, melons, and papaya    ¨ Raspberries and strawberries    ¨ Canned fruit with no added sugar    · Low-fat dairy products:      ¨ Nonfat (skim) milk, 1% milk, and low-fat almond, cashew, or soy milks fortified with calcium    ¨ Low-fat cheese, regular or frozen yogurt, and cottage cheese    · Meats and proteins , such as lean cuts of beef and pork (loin, leg, round), skinless chicken and turkey, legumes, soy products, egg whites, and nuts  Foods and drinks to limit or avoid:  Ask your dietitian or healthcare provider about these and other foods that are high in unhealthy fat, sodium, and sugar:  · Snack or packaged foods , such as frozen dinners, cookies, macaroni and cheese, and cereals with more than 300 mg of sodium per serving    · Canned or dry mixes  for cakes, soups, sauces, or gravies    · Vegetables with added sodium , such as instant potatoes, vegetables with added sauces, or regular canned vegetables    · Other foods high in sodium , such as ketchup, barbecue sauce, salad dressing, pickles, olives, soy sauce, and miso    · High-fat dairy foods  such as whole or 2% milk, cream cheese, or sour cream, and cheeses     · High-fat protein foods  such as high-fat cuts of beef (T-bone steaks, ribs), chicken or turkey with skin, and organ meats, such as liver    · Cured or smoked meats , such as hot dogs, smith, and sausage    · Unhealthy fats and oils , such as butter, stick margarine, shortening, and cooking oils such as coconut or palm oil    · Food and drinks high in sugar , such as soft drinks (soda), sports drinks, sweetened tea, candy, cake, cookies, pies, and doughnuts  Other diet guidelines to follow:   · Eat more foods containing omega-3 fats  Eat fish high in omega-3 fats at least 2 times a week  · Limit alcohol    Too much alcohol can damage your heart and raise your blood pressure  Women should limit alcohol to 1 drink a day  Men should limit alcohol to 2 drinks a day  A drink of alcohol is 12 ounces of beer, 5 ounces of wine, or 1½ ounces of liquor  · Choose low-sodium foods  High-sodium foods can lead to high blood pressure  Add little or no salt to food you prepare  Use herbs and spices in place of salt  · Eat more fiber  to help lower cholesterol levels  Eat at least 5 servings of fruits and vegetables each day  Eat 3 ounces of whole-grain foods each day  Legumes (beans) are also a good source of fiber  Lifestyle guidelines:   · Do not smoke  Nicotine and other chemicals in cigarettes and cigars can cause lung and heart damage  Ask your healthcare provider for information if you currently smoke and need help to quit  E-cigarettes or smokeless tobacco still contain nicotine  Talk to your healthcare provider before you use these products  · Exercise regularly  to help you maintain a healthy weight and improve your blood pressure and cholesterol levels  Ask your healthcare provider about the best exercise plan for you  Do not start an exercise program without asking your healthcare provider  Follow up with your healthcare provider as directed:  Write down your questions so you remember to ask them during your visits  © 2017 [facility] [facility] Information is for End User's use only and may not be sold, redistributed or otherwise used for commercial purposes  All illustrations and images included in CareNotes® are the copyrighted property of [employer]  or [employer]  The above information is an  only  It is not intended as medical advice for individual conditions or treatments  Talk to your doctor, nurse or pharmacist before following any medical regimen to see if it is safe and effective for you  Depression   WHAT YOU NEED TO KNOW:   What is depression?   Depression is a medical condition that causes feelings of sadness or hopelessness that do not go away  Depression may cause you to lose interest in things you used to enjoy  These feelings may interfere with your daily life  What causes or increases my risk for depression? Depression may be caused by changes in brain chemicals that affect your mood  Your risk for depression may be higher if you have any of the following:  · Stressful events such as the death of a loved one, unemployment, childhood trauma, divorce, or domestic abuse    · A chronic medical condition such as diabetes, heart disease, or cancer    · Parents, siblings, or other family members with a history of depression    · Drug or alcohol abuse  What are the signs and symptoms of depression? · Appetite changes, or weight gain or loss    · Trouble going to sleep or staying asleep, or sleeping too much    · Fatigue or lack of energy    · Feeling restless, irritable, or withdrawn    · Feeling worthless, hopeless, discouraged, or guilty    · Trouble concentrating, remembering things, doing daily tasks, or making decisions    · Thoughts about hurting or killing yourself  How is depression diagnosed? Your healthcare provider will ask about your symptoms and how long you have had them  He or she will ask if you have any family members with depression  Tell your healthcare provider about any stressful events in your life  He or she may ask about any other health conditions or medicines you take  How is depression treated? · Therapy  may be used to treat your depression  A therapist will help you learn to cope with your thoughts and feelings  This can be done alone or in a group  It may also be done with family members or a significant other  · Antidepressant medicine  may be given to improve or balance your mood  You may need to take this medicine for several weeks before you begin to feel better  Tell your healthcare provider about any side effects or problems you have with your medicine  The type or amount of medicine may need to be changed  How can I manage depression? · Get regular physical activity  Try to exercise for 30 minutes, 3 to 5 days a week  Work with your healthcare provider to develop an exercise plan that you enjoy  Physical activity may improve your symptoms  · Get enough sleep  Create a routine to help you relax before bed  You can listen to music, read, or do yoga  Try to go to bed and wake up at the same time every day  Sleep is important for emotional health  · Eat a variety of healthy foods from all of the food groups  A healthy meal plan is low in fat, salt, and added sugar  Ask your healthcare provider for more information about a meal plan that is right for you  · Do not drink alcohol or use drugs  Alcohol and drugs can make your symptoms worse  Call 911 for any of the following:   · You think about harming yourself or someone else  When should I contact my healthcare provider? · Your symptoms do not improve  · You cannot make it to your next appointment  · You have new symptoms  · You have questions or concerns about your condition or care  CARE AGREEMENT:   You have the right to help plan your care  Learn about your health condition and how it may be treated  Discuss treatment options with your caregivers to decide what care you want to receive  You always have the right to refuse treatment  The above information is an  only  It is not intended as medical advice for individual conditions or treatments  Talk to your doctor, nurse or pharmacist before following any medical regimen to see if it is safe and effective for you  © 2017 [employer] Information is for End User's use only and may not be sold, redistributed or otherwise used for commercial purposes  All illustrations and images included in CareNotes® are the copyrighted property of [employer]  or [employer]

## 2019-07-09 NOTE — ASSESSMENT & PLAN NOTE
Depression symptoms persist and are affecting her daily functioning  Because of the difficulty concentrating and completing tasks, discussed with her taking of short-term leave from work especially since the recent stressor just occurred over the past few weeks  Discussed antidepressants with her and she will consider these and get back to us regarding this  She should consider counseling  Relaxation techniques discussed with her  Will have her follow up in about 2-3 weeks or sooner if needed

## 2019-07-09 NOTE — PROGRESS NOTES
Assessment/Plan:    Allergic rhinitis  Allergy symptoms acting up at present  Continue with steroid nasal spray  Discussed with her using antihistamines  Potential side effects discussed  Discussed nasal irrigation which may be somewhat helpful  Call or follow-up if symptoms worsen or persist     Mixed hyperlipidemia  Watch diet  Increase fiber in diet  Increase activity level  Stress is likely playing a role in her eating habits at present  Slip printed for her laboratory testing to have done within the next few weeks  Reactive depression  Depression symptoms persist and are affecting her daily functioning  Because of the difficulty concentrating and completing tasks, discussed with her taking of short-term leave from work especially since the recent stressor just occurred over the past few weeks  Discussed antidepressants with her and she will consider these and get back to us regarding this  She should consider counseling  Relaxation techniques discussed with her  Will have her follow up in about 2-3 weeks or sooner if needed  Diagnoses and all orders for this visit:    Allergic rhinitis, unspecified seasonality, unspecified trigger  -     Comprehensive metabolic panel; Future  -     Lipid panel; Future  -     TSH, 3rd generation; Future    Reactive depression  -     Comprehensive metabolic panel; Future  -     Lipid panel; Future  -     TSH, 3rd generation; Future    Anxiety  -     Comprehensive metabolic panel; Future  -     Lipid panel; Future  -     TSH, 3rd generation; Future    Mixed hyperlipidemia  -     Comprehensive metabolic panel; Future  -     Lipid panel; Future  -     TSH, 3rd generation; Future    BMI 32 0-32 9,adult  -     Comprehensive metabolic panel; Future  -     Lipid panel; Future  -     TSH, 3rd generation; Future    Screening for colon cancer  -     Comprehensive metabolic panel; Future  -     Lipid panel; Future  -     TSH, 3rd generation;  Future    Other orders  - Cancel: Mammo screening bilateral w 3d & cad; Future  -     Cancel: Ambulatory referral to Gastroenterology; Future        Orders recommendations as noted above  She will get the flu shot in the fall  She declines mammogram at this point because of the recent stressors  She is willing to get this but wishes to wait till the fall  She wishes to hold off on any colorectal cancer screening at present  She may consider the colo guard in the future  Will have her follow up in 2-3 weeks for recheck of the depression symptoms and stress level  Will have her follow up in 6 months for her routine visit unless otherwise indicated  Subjective:      Patient ID: Vicky Adams is a 47 y o  female  She presents for routine follow-up  Has been under an exceeding amount of stress  Her adult daughter was recently adopted by her sister  This has been exceedingly stressful for her  She feels betrayed by her sister and her daughter  She has been unable to concentrate  Has been having difficulty at work because of this  Crying frequently  Sleeping but does not feel rested  Feels depressed but wants to try to avoid medication if possible because she feels it is more situational   Feels anxious at times  Is concerned about returning to work at this point after vacation since she is having difficulty concentrating and completing tasks because of her depression  Occasional headaches because of this  Allergies have been acting up  Feels increased nasal congestion and some postnasal drip  Ears have been increasingly itchy  Some decreased hearing  Eyes slightly itchy  Appetite has been somewhat decreased  She has been eating less healthy because of the stress  Denies any nausea, vomiting, or diarrhea  Occasional reflux symptoms  Has been so distracted that she did not have her laboratory testing done prior to this visit  Denies any urinary symptoms        The following portions of the patient's history were reviewed and updated as appropriate:   She  has no past medical history on file  She   Patient Active Problem List    Diagnosis Date Noted    Reactive depression 07/09/2019    Mixed hyperlipidemia 06/06/2018    Essential hypertension 06/06/2018    Screening for colon cancer 06/06/2018    Anxiety 06/06/2018    Allergic rhinitis 06/06/2018     She  has a past surgical history that includes No past surgeries  Her family history includes Heart disease in her mother; Other in her father  She  reports that she has never smoked  She has never used smokeless tobacco  She reports that she does not drink alcohol or use drugs  Current Outpatient Medications   Medication Sig Dispense Refill    fluticasone (FLONASE) 50 mcg/act nasal spray 1 spray into each nostril daily 16 g 5     No current facility-administered medications for this visit  Current Outpatient Medications on File Prior to Visit   Medication Sig    fluticasone (FLONASE) 50 mcg/act nasal spray 1 spray into each nostril daily     No current facility-administered medications on file prior to visit  She has No Known Allergies       Review of Systems   Constitutional: Positive for fatigue  Negative for activity change, appetite change, chills and fever  HENT: Positive for congestion, ear pain and rhinorrhea  Eyes: Negative for visual disturbance  Respiratory: Negative for cough, chest tightness and shortness of breath  Cardiovascular: Negative for chest pain and palpitations  Gastrointestinal: Negative for abdominal pain, blood in stool, diarrhea, nausea and vomiting  Endocrine: Negative for polydipsia, polyphagia and polyuria  Genitourinary: Negative for dysuria, frequency and urgency  Musculoskeletal: Negative for gait problem  Skin: Negative for color change  Neurological: Negative for dizziness and headaches  Hematological: Does not bruise/bleed easily     Psychiatric/Behavioral: Positive for decreased concentration and dysphoric mood  Negative for confusion, sleep disturbance and suicidal ideas  The patient is nervous/anxious  Objective:      /80 (BP Location: Left arm, Patient Position: Sitting, Cuff Size: Standard)   Pulse 104   Temp 98 1 °F (36 7 °C) (Temporal)   Ht 5' 5" (1 651 m)   Wt 87 5 kg (193 lb)   SpO2 98%   BMI 32 12 kg/m²          Physical Exam   Constitutional: She is oriented to person, place, and time  She is cooperative  No distress  HENT:   Head: Normocephalic and atraumatic  Mouth/Throat: Oropharynx is clear and moist    Eyes: Pupils are equal, round, and reactive to light  Conjunctivae are normal  Right eye exhibits no discharge  Left eye exhibits no discharge  Neck: Carotid bruit is not present  Cardiovascular: Normal rate, regular rhythm and normal heart sounds  Exam reveals no gallop and no friction rub  No murmur heard  Pulmonary/Chest: No respiratory distress  She has no wheezes  She has no rales  Abdominal: Bowel sounds are normal  She exhibits no distension  There is no tenderness  Musculoskeletal: She exhibits no edema  Lymphadenopathy:     She has no cervical adenopathy  Neurological: She is alert and oriented to person, place, and time  Skin: Skin is warm and dry  Psychiatric: Her behavior is normal  Cognition and memory are normal  She exhibits a depressed mood  Nursing note and vitals reviewed  BMI Counseling: Body mass index is 32 12 kg/m²  Discussed the patient's BMI with her  The BMI is above average  BMI counseling and education was provided to the patient  Nutrition recommendations include 3-5 servings of fruits/vegetables daily, consuming healthier snacks, moderation in carbohydrate intake, increasing intake of lean protein and reducing intake of cholesterol  Exercise recommendations include exercising 3-5 times per week

## 2019-07-09 NOTE — ASSESSMENT & PLAN NOTE
Watch diet  Increase fiber in diet  Increase activity level  Stress is likely playing a role in her eating habits at present  Slip printed for her laboratory testing to have done within the next few weeks

## 2019-07-09 NOTE — LETTER
July 9, 2019     Patient: Joel Hansen   YOB: 1965   Date of Visit: 7/9/2019       To Whom it May Concern:    Joel Hansen is under my professional care  She was seen in my office on 7/9/2019  She may return to work on - uncertain  Due to recent stressors and situational depression, excuse her from work for 7/14/19 through 8/2/19  She will be reassessed the week prior to recheck  If you have any questions or concerns, please don't hesitate to call           Sincerely,          Maria Elena Herring MD        CC: No Recipients

## 2019-07-09 NOTE — ASSESSMENT & PLAN NOTE
Allergy symptoms acting up at present  Continue with steroid nasal spray  Discussed with her using antihistamines  Potential side effects discussed  Discussed nasal irrigation which may be somewhat helpful    Call or follow-up if symptoms worsen or persist

## 2019-07-16 ENCOUNTER — VBI (OUTPATIENT)
Dept: BEHAVIORAL/MENTAL HEALTH CLINIC | Facility: CLINIC | Age: 54
End: 2019-07-16

## 2019-07-16 NOTE — TELEPHONE ENCOUNTER
Initial attempt made and documented for [facility] Screening Patient/Employee Outreach on 07/16/19  Follow-up outreach scheduled to be completed within 3 business days  Damion Infante MA   Second attempt made and documented for [facility] Screening Patient/Employee Outreach on 07/24/19  Follow-up outreach scheduled to be completed within 3 business days  Damion Infante MA   All efforts were made to contact the Patient/Employee  All points of contact were tried without success of connecting to the Patient/Employee or leaving a voicemail  Due to the Patient/Employee being unreachable, as a final attempt, a letter will be mailed       Damion Infante MA

## 2019-07-18 ENCOUNTER — TELEPHONE (OUTPATIENT)
Dept: INTERNAL MEDICINE CLINIC | Facility: CLINIC | Age: 54
End: 2019-07-18

## 2019-07-18 NOTE — TELEPHONE ENCOUNTER
[facility] called regarding Chapincito Silva  They were just verifying her visit date, f/u visit date and if a letter regarding her being out of work  He stated he faxed paperwork to be filled out and wanted to know if it was received  Sent to [employer]  He will call on Monday to see if it arrived

## 2019-07-24 ENCOUNTER — APPOINTMENT (OUTPATIENT)
Dept: LAB | Facility: MEDICAL CENTER | Age: 54
End: 2019-07-24
Payer: COMMERCIAL

## 2019-07-24 DIAGNOSIS — E78.2 MIXED HYPERLIPIDEMIA: ICD-10-CM

## 2019-07-24 DIAGNOSIS — I10 ESSENTIAL HYPERTENSION: ICD-10-CM

## 2019-07-24 LAB
ALBUMIN SERPL BCP-MCNC: 3.6 G/DL (ref 3.5–5)
ALP SERPL-CCNC: 112 U/L (ref 46–116)
ALT SERPL W P-5'-P-CCNC: 20 U/L (ref 12–78)
ANION GAP SERPL CALCULATED.3IONS-SCNC: 6 MMOL/L (ref 4–13)
AST SERPL W P-5'-P-CCNC: 13 U/L (ref 5–45)
BASOPHILS # BLD AUTO: 0.06 THOUSANDS/ΜL (ref 0–0.1)
BASOPHILS NFR BLD AUTO: 1 % (ref 0–1)
BILIRUB SERPL-MCNC: 1 MG/DL (ref 0.2–1)
BUN SERPL-MCNC: 11 MG/DL (ref 5–25)
CALCIUM SERPL-MCNC: 8.9 MG/DL (ref 8.3–10.1)
CHLORIDE SERPL-SCNC: 108 MMOL/L (ref 100–108)
CHOLEST SERPL-MCNC: 218 MG/DL (ref 50–200)
CO2 SERPL-SCNC: 26 MMOL/L (ref 21–32)
CREAT SERPL-MCNC: 0.96 MG/DL (ref 0.6–1.3)
EOSINOPHIL # BLD AUTO: 0.09 THOUSAND/ΜL (ref 0–0.61)
EOSINOPHIL NFR BLD AUTO: 1 % (ref 0–6)
ERYTHROCYTE [DISTWIDTH] IN BLOOD BY AUTOMATED COUNT: 13.5 % (ref 11.6–15.1)
GFR SERPL CREATININE-BSD FRML MDRD: 68 ML/MIN/1.73SQ M
GLUCOSE P FAST SERPL-MCNC: 100 MG/DL (ref 65–99)
HCT VFR BLD AUTO: 46.6 % (ref 34.8–46.1)
HDLC SERPL-MCNC: 63 MG/DL (ref 40–60)
HGB BLD-MCNC: 15.2 G/DL (ref 11.5–15.4)
IMM GRANULOCYTES # BLD AUTO: 0.02 THOUSAND/UL (ref 0–0.2)
IMM GRANULOCYTES NFR BLD AUTO: 0 % (ref 0–2)
LDLC SERPL CALC-MCNC: 137 MG/DL (ref 0–100)
LYMPHOCYTES # BLD AUTO: 1.41 THOUSANDS/ΜL (ref 0.6–4.47)
LYMPHOCYTES NFR BLD AUTO: 20 % (ref 14–44)
MCH RBC QN AUTO: 29.1 PG (ref 26.8–34.3)
MCHC RBC AUTO-ENTMCNC: 32.6 G/DL (ref 31.4–37.4)
MCV RBC AUTO: 89 FL (ref 82–98)
MONOCYTES # BLD AUTO: 0.5 THOUSAND/ΜL (ref 0.17–1.22)
MONOCYTES NFR BLD AUTO: 7 % (ref 4–12)
NEUTROPHILS # BLD AUTO: 4.82 THOUSANDS/ΜL (ref 1.85–7.62)
NEUTS SEG NFR BLD AUTO: 71 % (ref 43–75)
NONHDLC SERPL-MCNC: 155 MG/DL
NRBC BLD AUTO-RTO: 0 /100 WBCS
PLATELET # BLD AUTO: 444 THOUSANDS/UL (ref 149–390)
PMV BLD AUTO: 10.9 FL (ref 8.9–12.7)
POTASSIUM SERPL-SCNC: 3.6 MMOL/L (ref 3.5–5.3)
PROT SERPL-MCNC: 7.8 G/DL (ref 6.4–8.2)
RBC # BLD AUTO: 5.22 MILLION/UL (ref 3.81–5.12)
SODIUM SERPL-SCNC: 140 MMOL/L (ref 136–145)
TRIGL SERPL-MCNC: 91 MG/DL
TSH SERPL DL<=0.05 MIU/L-ACNC: 0.35 UIU/ML (ref 0.36–3.74)
WBC # BLD AUTO: 6.9 THOUSAND/UL (ref 4.31–10.16)

## 2019-07-24 PROCEDURE — 85025 COMPLETE CBC W/AUTO DIFF WBC: CPT

## 2019-07-24 PROCEDURE — 36415 COLL VENOUS BLD VENIPUNCTURE: CPT

## 2019-07-24 PROCEDURE — 80061 LIPID PANEL: CPT

## 2019-07-24 PROCEDURE — 84443 ASSAY THYROID STIM HORMONE: CPT

## 2019-07-24 PROCEDURE — 80053 COMPREHEN METABOLIC PANEL: CPT

## 2019-07-30 ENCOUNTER — OFFICE VISIT (OUTPATIENT)
Dept: INTERNAL MEDICINE CLINIC | Facility: CLINIC | Age: 54
End: 2019-07-30
Payer: COMMERCIAL

## 2019-07-30 VITALS
HEIGHT: 65 IN | WEIGHT: 194 LBS | TEMPERATURE: 97.4 F | DIASTOLIC BLOOD PRESSURE: 70 MMHG | SYSTOLIC BLOOD PRESSURE: 110 MMHG | HEART RATE: 109 BPM | OXYGEN SATURATION: 96 % | BODY MASS INDEX: 32.32 KG/M2

## 2019-07-30 DIAGNOSIS — F41.9 ANXIETY: ICD-10-CM

## 2019-07-30 DIAGNOSIS — F32.9 REACTIVE DEPRESSION: Primary | ICD-10-CM

## 2019-07-30 DIAGNOSIS — R79.89 LOW TSH LEVEL: ICD-10-CM

## 2019-07-30 PROCEDURE — 99213 OFFICE O/P EST LOW 20 MIN: CPT | Performed by: FAMILY MEDICINE

## 2019-07-30 RX ORDER — BUPROPION HYDROCHLORIDE 150 MG/1
150 TABLET ORAL EVERY MORNING
Qty: 30 TABLET | Refills: 1 | Status: SHIPPED | OUTPATIENT
Start: 2019-07-30 | End: 2019-08-29 | Stop reason: ALTCHOICE

## 2019-07-30 NOTE — LETTER
July 30, 2019     Patient: Grant Zhang   YOB: 1965   Date of Visit: 7/30/2019       To Whom it May Concern:    Grant Zhang is under my professional care  She was seen in my office on 7/30/2019  Patient was evaluated today due to persistent symptoms and medication adjustment, she will require another 3-4 weeks off of work  She will be reassessed on Thursday August 29, 2019  If you have any questions or concerns, please don't hesitate to call           Sincerely,          Ella Alex MD        CC: No Recipients

## 2019-07-30 NOTE — PROGRESS NOTES
Assessment/Plan:    No problem-specific Assessment & Plan notes found for this encounter  Diagnoses and all orders for this visit:    Reactive depression  -     buPROPion (WELLBUTRIN XL) 150 mg 24 hr tablet; Take 1 tablet (150 mg total) by mouth every morning    Anxiety  -     buPROPion (WELLBUTRIN XL) 150 mg 24 hr tablet; Take 1 tablet (150 mg total) by mouth every morning    Low TSH level  -     NM thyroid imaging w uptake(s); Future        Orders recommendations as noted above  Her depression symptoms have persisted  Discussed with her options regarding treatment  Discussed options for medications  Will start her on the Wellbutrin  Potential side effects discussed  Recent laboratory testing reviewed with her  TSH remains somewhat low  Will check thyroid get uptake  Note given for work  He over the last week or so the FMLA forms were completed  She copy on chart  Will have her follow up in about a month or sooner if needed  Subjective:      Patient ID: Ella Lion is a 47 y o  female  She presents for follow-up  Still continues to feel down and depressed  Still with difficulty concentrating and completing even simple tasks  Feels down because of the situations  She now is agreeable to considering medication for treatment  She does not feel that she is improving as quickly as she expected  Has difficulty concentrating even on things at home  Notices that she is more forgetful  Still crying frequently  Some issues with anxiety at times  Has difficulty shutting off her mind at times  Difficulty relaxing  Denies any suicidal or homicidal ideation  Has the support of her   Appetite is variable  The following portions of the patient's history were reviewed and updated as appropriate:   She  has no past medical history on file    She   Patient Active Problem List    Diagnosis Date Noted    Low TSH level 07/30/2019    Reactive depression 07/09/2019    Mixed hyperlipidemia 06/06/2018    Essential hypertension 06/06/2018    Screening for colon cancer 06/06/2018    Anxiety 06/06/2018    Allergic rhinitis 06/06/2018     She  has a past surgical history that includes No past surgeries  Her family history includes Heart disease in her mother; Other in her father  She  reports that she has never smoked  She has never used smokeless tobacco  She reports that she does not drink alcohol or use drugs  Current Outpatient Medications   Medication Sig Dispense Refill    fluticasone (FLONASE) 50 mcg/act nasal spray 1 spray into each nostril daily 16 g 5    buPROPion (WELLBUTRIN XL) 150 mg 24 hr tablet Take 1 tablet (150 mg total) by mouth every morning 30 tablet 1     No current facility-administered medications for this visit  Current Outpatient Medications on File Prior to Visit   Medication Sig    fluticasone (FLONASE) 50 mcg/act nasal spray 1 spray into each nostril daily     No current facility-administered medications on file prior to visit  She is allergic to measles mumps and rubella virus vaccine live       Review of Systems   Constitutional: Positive for activity change and fatigue  Negative for appetite change, chills and fever  HENT: Negative for congestion and rhinorrhea  Eyes: Negative for visual disturbance  Respiratory: Negative for chest tightness and shortness of breath  Cardiovascular: Negative for chest pain and palpitations  Gastrointestinal: Negative for abdominal pain, blood in stool, diarrhea, nausea and vomiting  Endocrine: Negative for polydipsia, polyphagia and polyuria  Genitourinary: Negative for dysuria, frequency and urgency  Musculoskeletal: Positive for arthralgias  Negative for gait problem  Skin: Negative for color change  Neurological: Negative for dizziness and headaches  Hematological: Does not bruise/bleed easily     Psychiatric/Behavioral: Positive for decreased concentration, dysphoric mood and sleep disturbance  Negative for confusion  The patient is nervous/anxious  Objective:      /70 (BP Location: Left arm, Patient Position: Sitting, Cuff Size: Standard)   Pulse (!) 109   Temp (!) 97 4 °F (36 3 °C) (Temporal)   Ht 5' 5" (1 651 m)   Wt 88 kg (194 lb)   SpO2 96%   BMI 32 28 kg/m²          Physical Exam   Constitutional: She is oriented to person, place, and time  She is cooperative  No distress  HENT:   Head: Normocephalic and atraumatic  Mouth/Throat: Oropharynx is clear and moist    Eyes: Pupils are equal, round, and reactive to light  Conjunctivae are normal  Right eye exhibits no discharge  Left eye exhibits no discharge  Cardiovascular: Normal rate, regular rhythm and normal heart sounds  Exam reveals no gallop and no friction rub  No murmur heard  Pulmonary/Chest: No respiratory distress  She has no wheezes  She has no rales  Abdominal: Bowel sounds are normal  She exhibits no distension  There is no tenderness  Musculoskeletal: She exhibits no edema  Lymphadenopathy:     She has no cervical adenopathy  Neurological: She is alert and oriented to person, place, and time  Skin: Skin is warm and dry  Psychiatric: Her behavior is normal  She exhibits a depressed mood  Nursing note and vitals reviewed

## 2019-08-29 ENCOUNTER — OFFICE VISIT (OUTPATIENT)
Dept: INTERNAL MEDICINE CLINIC | Facility: CLINIC | Age: 54
End: 2019-08-29
Payer: COMMERCIAL

## 2019-08-29 VITALS
SYSTOLIC BLOOD PRESSURE: 130 MMHG | OXYGEN SATURATION: 95 % | BODY MASS INDEX: 32.32 KG/M2 | TEMPERATURE: 98.4 F | HEIGHT: 65 IN | DIASTOLIC BLOOD PRESSURE: 80 MMHG | WEIGHT: 194 LBS | HEART RATE: 73 BPM

## 2019-08-29 DIAGNOSIS — F32.9 REACTIVE DEPRESSION: ICD-10-CM

## 2019-08-29 DIAGNOSIS — F41.9 ANXIETY: Primary | ICD-10-CM

## 2019-08-29 PROCEDURE — 99213 OFFICE O/P EST LOW 20 MIN: CPT | Performed by: FAMILY MEDICINE

## 2019-08-29 RX ORDER — DULOXETIN HYDROCHLORIDE 20 MG/1
20 CAPSULE, DELAYED RELEASE ORAL 2 TIMES DAILY
Qty: 180 CAPSULE | Refills: 3 | Status: SHIPPED | OUTPATIENT
Start: 2019-08-29 | End: 2021-01-20

## 2019-08-30 NOTE — PROGRESS NOTES
Assessment/Plan:    No problem-specific Assessment & Plan notes found for this encounter  Diagnoses and all orders for this visit:    Anxiety  -     DULoxetine (CYMBALTA) 20 mg capsule; Take 1 capsule (20 mg total) by mouth 2 (two) times a day    Reactive depression  -     DULoxetine (CYMBALTA) 20 mg capsule; Take 1 capsule (20 mg total) by mouth 2 (two) times a day        She has some persistent symptoms  Depression symptoms had improved somewhat with the Wellbutrin but unfortunately the anxiety symptoms worsened  Will start her on Cymbalta  Will start at a relatively low dosage  Potential side effects discussed  Discussed with her that this will help with both anxiety and depression and does not affect sleep  Will have her follow up in about 2 weeks to assess whether she is ready to return to work at that point  Subjective:      Patient ID: Chapincito Ralph is a 49 y o  female  She presents for follow-up  Has generally been doing about the same except for some worsening anxiety symptoms  She has not noticed any significant improvement on Cymbalta  The only thing she noticed with the Cymbalta was that she does have increased anxiety symptoms  Still has difficulty concentrating at times  Has difficulty completing some tasks because of this  She does feel that she is dealing with the stressors associated with her daughter better  Wellbutrin did not affect her sleep  Energy level did not increase with it  She has episodes where she has which she describes as panic attacks  She suddenly feels like she can't concentrate, suddenly has palpitations, and has some shortness of breath  She has checked her pulse during these episodes, however in it has been regular  The following portions of the patient's history were reviewed and updated as appropriate:   She  has no past medical history on file    She   Patient Active Problem List    Diagnosis Date Noted    Low TSH level 07/30/2019    Reactive depression 07/09/2019    Mixed hyperlipidemia 06/06/2018    Essential hypertension 06/06/2018    Screening for colon cancer 06/06/2018    Anxiety 06/06/2018    Allergic rhinitis 06/06/2018     She  has a past surgical history that includes No past surgeries  Her family history includes Heart disease in her mother; Other in her father  She  reports that she has never smoked  She has never used smokeless tobacco  She reports that she does not drink alcohol or use drugs  Current Outpatient Medications   Medication Sig Dispense Refill    fluticasone (FLONASE) 50 mcg/act nasal spray 1 spray into each nostril daily 16 g 5    DULoxetine (CYMBALTA) 20 mg capsule Take 1 capsule (20 mg total) by mouth 2 (two) times a day 180 capsule 3     No current facility-administered medications for this visit  Current Outpatient Medications on File Prior to Visit   Medication Sig    fluticasone (FLONASE) 50 mcg/act nasal spray 1 spray into each nostril daily     No current facility-administered medications on file prior to visit  She is allergic to measles mumps and rubella virus vaccine live       Review of Systems   Constitutional: Positive for activity change, appetite change and fatigue  Respiratory:        As per HPI   Cardiovascular:        As per HPI   Psychiatric/Behavioral: Positive for decreased concentration and dysphoric mood  The patient is nervous/anxious  Objective:      /80 (BP Location: Left arm, Patient Position: Sitting, Cuff Size: Standard)   Pulse 73   Temp 98 4 °F (36 9 °C) (Temporal)   Ht 5' 5" (1 651 m)   Wt 88 kg (194 lb)   SpO2 95%   BMI 32 28 kg/m²          Physical Exam   Constitutional: She is cooperative  Neurological: She is alert  Psychiatric: Her mood appears anxious  Nursing note and vitals reviewed

## 2019-09-11 ENCOUNTER — TELEPHONE (OUTPATIENT)
Dept: INTERNAL MEDICINE CLINIC | Facility: CLINIC | Age: 54
End: 2019-09-11

## 2019-09-11 NOTE — TELEPHONE ENCOUNTER
Faby Romo called stating she spoke to HR  She gave me the number of Set who is the person to which the note is to be faxed  [phone]    She also stated there mercy be new [facility] paperwork

## 2019-09-11 NOTE — TELEPHONE ENCOUNTER
Patient called cancelling appointment tomorrow because she is feeling better and is ready to go back to work Monday  Patient is requesting for you to write a note for her to return to work on Monday 9/16/19

## 2019-12-16 ENCOUNTER — OFFICE VISIT (OUTPATIENT)
Dept: URGENT CARE | Facility: CLINIC | Age: 54
End: 2019-12-16
Payer: COMMERCIAL

## 2019-12-16 ENCOUNTER — APPOINTMENT (OUTPATIENT)
Dept: RADIOLOGY | Facility: CLINIC | Age: 54
End: 2019-12-16
Payer: COMMERCIAL

## 2019-12-16 VITALS
SYSTOLIC BLOOD PRESSURE: 164 MMHG | TEMPERATURE: 98.8 F | OXYGEN SATURATION: 100 % | HEART RATE: 97 BPM | BODY MASS INDEX: 32.28 KG/M2 | RESPIRATION RATE: 16 BRPM | WEIGHT: 194 LBS | DIASTOLIC BLOOD PRESSURE: 108 MMHG

## 2019-12-16 DIAGNOSIS — M25.561 ACUTE PAIN OF RIGHT KNEE: Primary | ICD-10-CM

## 2019-12-16 DIAGNOSIS — M25.561 ACUTE PAIN OF RIGHT KNEE: ICD-10-CM

## 2019-12-16 PROCEDURE — 99213 OFFICE O/P EST LOW 20 MIN: CPT | Performed by: NURSE PRACTITIONER

## 2019-12-16 PROCEDURE — 73562 X-RAY EXAM OF KNEE 3: CPT

## 2019-12-16 PROCEDURE — S9088 SERVICES PROVIDED IN URGENT: HCPCS | Performed by: NURSE PRACTITIONER

## 2019-12-16 RX ORDER — NAPROXEN 500 MG/1
500 TABLET ORAL 2 TIMES DAILY WITH MEALS
Qty: 20 TABLET | Refills: 0 | Status: SHIPPED | OUTPATIENT
Start: 2019-12-16 | End: 2021-01-20

## 2019-12-16 NOTE — PROGRESS NOTES
[facility]        NAME: Katelyn Porter is a 49 y o  female  : 1965    MRN: [id number]  DATE: 2019  TIME: 3:04 PM    Assessment and Plan   Acute pain of right knee [M25 561]  1  Acute pain of right knee  XR knee 3 vw right non injury    naproxen (NAPROSYN) 500 mg tablet         Patient Instructions   Right knee x-ray without acute findings  Results were reviewed with patient  Apply ice for 10-15 minutes every 1-2 hours if needed    Follow-up with orthopedic specialist if symptoms persist     Chief Complaint     Chief Complaint   Patient presents with    Knee Pain     right x 1 week         History of Present Illness       Knee Pain    Incident onset: Right knee has been stiff x1 week  Increased pain while getting in vehicle at lunch  Felt a pop  The pain is present in the right knee  Pain scale: Pain ranges from a 0 at rest to up to 1/53 with certain movements    The symptoms are aggravated by movement  She has tried NSAIDs for the symptoms  The treatment provided mild relief  Review of Systems   Review of Systems   Constitutional: Negative for chills and fever  Musculoskeletal: Positive for arthralgias (Right knee pain)           Current Medications       Current Outpatient Medications:     fluticasone (FLONASE) 50 mcg/act nasal spray, 1 spray into each nostril daily, Disp: 16 g, Rfl: 5    DULoxetine (CYMBALTA) 20 mg capsule, Take 1 capsule (20 mg total) by mouth 2 (two) times a day (Patient not taking: Reported on 2019), Disp: 180 capsule, Rfl: 3    naproxen (NAPROSYN) 500 mg tablet, Take 1 tablet (500 mg total) by mouth 2 (two) times a day with meals, Disp: 20 tablet, Rfl: 0    Current Allergies     Allergies as of 2019 - Reviewed 2019   Allergen Reaction Noted    Measles mumps and rubella virus vaccine live Arthralgia 2019            The following portions of the patient's history were reviewed and updated as appropriate: allergies, current medications, past family history, past medical history, past social history, past surgical history and problem list      Past Medical History:   Diagnosis Date    Allergic     Anxiety        Past Surgical History:   Procedure Laterality Date    NO PAST SURGERIES         Family History   Problem Relation Age of Onset    Heart disease Mother     Other Father          Medications have been verified  Objective   BP (!) 164/108   Pulse 97   Temp 98 8 °F (37 1 °C)   Resp 16   Wt 88 kg (194 lb)   SpO2 100%   BMI 32 28 kg/m²        Physical Exam     Physical Exam   Constitutional: She is oriented to person, place, and time  She appears well-developed and well-nourished  No distress  Musculoskeletal:        Right knee: She exhibits decreased range of motion (Slightly decreased flexion  Pain with full extension ) and swelling  She exhibits no LCL laxity, normal patellar mobility and no MCL laxity  Tenderness found  Medial joint line and MCL tenderness noted  Neurological: She is alert and oriented to person, place, and time  Skin: Skin is warm and dry  She is not diaphoretic  Psychiatric: She has a normal mood and affect  Her behavior is normal    Nursing note and vitals reviewed

## 2019-12-16 NOTE — PATIENT INSTRUCTIONS

## 2020-12-15 ENCOUNTER — TELEMEDICINE (OUTPATIENT)
Dept: INTERNAL MEDICINE CLINIC | Facility: CLINIC | Age: 55
End: 2020-12-15
Payer: COMMERCIAL

## 2020-12-15 VITALS — OXYGEN SATURATION: 98 % | TEMPERATURE: 99.3 F | HEART RATE: 88 BPM

## 2020-12-15 DIAGNOSIS — B34.9 VIRAL INFECTION, UNSPECIFIED: Primary | ICD-10-CM

## 2020-12-15 PROCEDURE — 99441 PR PHYS/QHP TELEPHONE EVALUATION 5-10 MIN: CPT | Performed by: FAMILY MEDICINE

## 2020-12-15 PROCEDURE — U0003 INFECTIOUS AGENT DETECTION BY NUCLEIC ACID (DNA OR RNA); SEVERE ACUTE RESPIRATORY SYNDROME CORONAVIRUS 2 (SARS-COV-2) (CORONAVIRUS DISEASE [COVID-19]), AMPLIFIED PROBE TECHNIQUE, MAKING USE OF HIGH THROUGHPUT TECHNOLOGIES AS DESCRIBED BY CMS-2020-01-R: HCPCS | Performed by: FAMILY MEDICINE

## 2020-12-17 LAB — SARS-COV-2 RNA SPEC QL NAA+PROBE: NOT DETECTED

## 2020-12-21 ENCOUNTER — TELEPHONE (OUTPATIENT)
Dept: INTERNAL MEDICINE CLINIC | Facility: CLINIC | Age: 55
End: 2020-12-21

## 2020-12-21 ENCOUNTER — OFFICE VISIT (OUTPATIENT)
Dept: INTERNAL MEDICINE CLINIC | Facility: CLINIC | Age: 55
End: 2020-12-21
Payer: COMMERCIAL

## 2020-12-21 VITALS — HEART RATE: 98 BPM | TEMPERATURE: 99.4 F | OXYGEN SATURATION: 96 %

## 2020-12-21 DIAGNOSIS — J04.0 LARYNGITIS: ICD-10-CM

## 2020-12-21 DIAGNOSIS — B34.9 VIRAL INFECTION, UNSPECIFIED: ICD-10-CM

## 2020-12-21 DIAGNOSIS — Z20.822 EXPOSURE TO COVID-19 VIRUS: ICD-10-CM

## 2020-12-21 DIAGNOSIS — J01.90 ACUTE NON-RECURRENT SINUSITIS, UNSPECIFIED LOCATION: Primary | ICD-10-CM

## 2020-12-21 PROCEDURE — 99213 OFFICE O/P EST LOW 20 MIN: CPT | Performed by: FAMILY MEDICINE

## 2020-12-21 PROCEDURE — U0003 INFECTIOUS AGENT DETECTION BY NUCLEIC ACID (DNA OR RNA); SEVERE ACUTE RESPIRATORY SYNDROME CORONAVIRUS 2 (SARS-COV-2) (CORONAVIRUS DISEASE [COVID-19]), AMPLIFIED PROBE TECHNIQUE, MAKING USE OF HIGH THROUGHPUT TECHNOLOGIES AS DESCRIBED BY CMS-2020-01-R: HCPCS | Performed by: FAMILY MEDICINE

## 2020-12-21 RX ORDER — AMOXICILLIN AND CLAVULANATE POTASSIUM 875; 125 MG/1; MG/1
1 TABLET, FILM COATED ORAL EVERY 12 HOURS SCHEDULED
Qty: 14 TABLET | Refills: 0 | Status: SHIPPED | OUTPATIENT
Start: 2020-12-21 | End: 2020-12-28

## 2020-12-21 RX ORDER — PREDNISONE 10 MG/1
40 TABLET ORAL DAILY
Qty: 20 TABLET | Refills: 0 | Status: SHIPPED | OUTPATIENT
Start: 2020-12-21 | End: 2020-12-26

## 2020-12-22 LAB — SARS-COV-2 RNA SPEC QL NAA+PROBE: NOT DETECTED

## 2020-12-23 ENCOUNTER — TELEPHONE (OUTPATIENT)
Dept: INTERNAL MEDICINE CLINIC | Facility: CLINIC | Age: 55
End: 2020-12-23

## 2020-12-23 NOTE — TELEPHONE ENCOUNTER
Spoke to La Garcia stated she is feeling better  Stated voice is stronger but with talking looses it  Stated throat is still scratchy,   Stated she does cough once in a while  Stated she is drinking and stated she is starting to eat  She feels she would like to return to work on Monday, but needs a letter  As per our conversation I did type  the letter and fax ed it

## 2020-12-31 ENCOUNTER — IMMUNIZATIONS (OUTPATIENT)
Dept: FAMILY MEDICINE CLINIC | Facility: HOSPITAL | Age: 55
End: 2020-12-31

## 2020-12-31 DIAGNOSIS — Z23 ENCOUNTER FOR IMMUNIZATION: ICD-10-CM

## 2020-12-31 PROCEDURE — 0011A SARS-COV-2 / COVID-19 MRNA VACCINE (MODERNA) 100 MCG: CPT

## 2020-12-31 PROCEDURE — 91301 SARS-COV-2 / COVID-19 MRNA VACCINE (MODERNA) 100 MCG: CPT

## 2021-01-20 ENCOUNTER — TELEMEDICINE (OUTPATIENT)
Dept: INTERNAL MEDICINE CLINIC | Facility: CLINIC | Age: 56
End: 2021-01-20
Payer: COMMERCIAL

## 2021-01-20 DIAGNOSIS — Z20.822 EXPOSURE TO COVID-19 VIRUS: Primary | ICD-10-CM

## 2021-01-20 PROCEDURE — U0005 INFEC AGEN DETEC AMPLI PROBE: HCPCS | Performed by: FAMILY MEDICINE

## 2021-01-20 PROCEDURE — U0003 INFECTIOUS AGENT DETECTION BY NUCLEIC ACID (DNA OR RNA); SEVERE ACUTE RESPIRATORY SYNDROME CORONAVIRUS 2 (SARS-COV-2) (CORONAVIRUS DISEASE [COVID-19]), AMPLIFIED PROBE TECHNIQUE, MAKING USE OF HIGH THROUGHPUT TECHNOLOGIES AS DESCRIBED BY CMS-2020-01-R: HCPCS | Performed by: FAMILY MEDICINE

## 2021-01-20 PROCEDURE — 99441 PR PHYS/QHP TELEPHONE EVALUATION 5-10 MIN: CPT | Performed by: FAMILY MEDICINE

## 2021-01-20 NOTE — PROGRESS NOTES
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   They try to distance as much as possible and wear masks but this is difficult since she is caring for him  Discussed with her that she has to stay home from work  We will call her with the results of the testing and discuss further quarantine/isolation at that point  Warning signs and symptoms of active/symptomatic coronavirus discussed  I have spent 10 minutes directly with the patient  Greater than 50% of this time was spent in counseling/coordination of care regarding: instructions for management, patient and family education and importance of treatment compliance  Encounter provider Yony Franco MD    Provider located at [address] [place] [address]  [address] [place] [address] [place] [id number]    Recent Visits  Date Type Provider Dept   01/20/21 Igor Lozano MD  [facility] [place]   Showing recent visits within past 7 days and meeting all other requirements     Future Appointments  No visits were found meeting these conditions  Showing future appointments within next 150 days and meeting all other requirements        Patient agrees to participate in a virtual check in via telephone or video visit instead of presenting to the office to address urgent/immediate medical needs  Patient is aware this is a billable service  After connecting through Telephone, the patient was identified by name and date of birth   Vivia November was informed that this was a telemedicine visit and that the exam was being conducted confidentially over secure lines  My office door was closed  No one else was in the room  Mile Alegre acknowledged consent and understanding of privacy and security of the telemedicine visit  I informed the patient that I have reviewed her record in Epic and presented the opportunity for her to ask any questions regarding the visit today  The patient agreed to participate  It was my intent to perform this visit via video technology but the patient was not able to do a video connection so the visit was completed via audio telephone only  Subjective:   Mile Alegre is a 48 y o  female who is concerned about COVID-19  Patient is currently asymptomatic  Patient denies fever, chills, fatigue, malaise, congestion, rhinorrhea, sore throat, anosmia, loss of taste, cough, shortness of breath, chest tightness, abdominal pain, nausea, vomiting, diarrhea, myalgias and headaches  Exposure:   Contact with a person who is under investigation (PUI) for or who is positive for COVID-19 within the last 14 days?: Yes    Hospitalized recently for fever and/or lower respiratory symptoms?: No      Currently a healthcare worker that is involved in direct patient care?: No      Works in a special setting where the risk of COVID-19 transmission may be high? (this may include long-term care, correctional and penitentiary facilities; homeless shelters; assisted-living facilities and group homes ): No      Resident in a special setting where the risk of COVID-19 transmission may be high? (this may include long-term care, correctional and penitentiary facilities; homeless shelters; assisted-living facilities and group homes ): No      She presents due to exposure to coronavirus from her   Her  tested positive earlier this week    She has been around him since he has been increasing yellow over the last few days and she has been helping to care for him  She has not been able to stay distance from him at all times because she has been checking his pulse ox and giving him medications  She is asymptomatic at this point  Lab Results   Component Value Date    SARSCOV2 Not Detected 12/21/2020     Past Medical History:   Diagnosis Date    Allergic     Anxiety      Past Surgical History:   Procedure Laterality Date    NO PAST SURGERIES       Current Outpatient Medications   Medication Sig Dispense Refill    fluticasone (FLONASE) 50 mcg/act nasal spray 1 spray into each nostril daily 16 g 5     No current facility-administered medications for this visit  Allergies   Allergen Reactions    Measles Mumps And Rubella Virus Vaccine Live Arthralgia     Knee swelling       Review of Systems   Constitutional: Negative for chills, fatigue and fever  HENT: Negative for congestion, rhinorrhea and sore throat  Respiratory: Negative for cough, chest tightness and shortness of breath  Gastrointestinal: Negative for abdominal pain, diarrhea, nausea and vomiting  Musculoskeletal: Negative for myalgias  Neurological: Negative for headaches  Objective: There were no vitals filed for this visit  Physical Exam  Neurological:      Mental Status: She is alert  Psychiatric:         Behavior: Behavior is cooperative  VIRTUAL VISIT DISCLAIMER    Kevin Hansen acknowledges that she has consented to an online visit or consultation  She understands that the online visit is based solely on information provided by her, and that, in the absence of a face-to-face physical evaluation by the physician, the diagnosis she receives is both limited and provisional in terms of accuracy and completeness  This is not intended to replace a full medical face-to-face evaluation by the physician  Kevin Hansen understands and accepts these terms

## 2021-01-21 LAB — SARS-COV-2 RNA RESP QL NAA+PROBE: NEGATIVE

## 2021-01-29 ENCOUNTER — TELEMEDICINE (OUTPATIENT)
Dept: INTERNAL MEDICINE CLINIC | Facility: CLINIC | Age: 56
End: 2021-01-29
Payer: COMMERCIAL

## 2021-01-29 VITALS — TEMPERATURE: 97.5 F | OXYGEN SATURATION: 99 % | HEART RATE: 98 BPM

## 2021-01-29 DIAGNOSIS — Z20.822 EXPOSURE TO COVID-19 VIRUS: Primary | ICD-10-CM

## 2021-01-29 PROCEDURE — U0003 INFECTIOUS AGENT DETECTION BY NUCLEIC ACID (DNA OR RNA); SEVERE ACUTE RESPIRATORY SYNDROME CORONAVIRUS 2 (SARS-COV-2) (CORONAVIRUS DISEASE [COVID-19]), AMPLIFIED PROBE TECHNIQUE, MAKING USE OF HIGH THROUGHPUT TECHNOLOGIES AS DESCRIBED BY CMS-2020-01-R: HCPCS | Performed by: NURSE PRACTITIONER

## 2021-01-29 PROCEDURE — U0005 INFEC AGEN DETEC AMPLI PROBE: HCPCS | Performed by: NURSE PRACTITIONER

## 2021-01-29 PROCEDURE — 99442 PR PHYS/QHP TELEPHONE EVALUATION 11-20 MIN: CPT | Performed by: NURSE PRACTITIONER

## 2021-01-29 NOTE — PROGRESS NOTES
COVID-19 Virtual Visit     Assessment/Plan: Patient did have positive exposure to  who did test positive for COVID  She is not having symptoms but would like to tested  He is in ICU and possibly being discharged today  Will obtain swab and will follow up once swab is back  Problem List Items Addressed This Visit        Other    Exposure to COVID-19 virus - Primary    Relevant Orders    Novel Coronavirus (Covid-19),PCR [facility] - Collected in Office         Disposition:     I have spent 15 minutes directly with the patient  Encounter provider Emma [id number], [facility]    Provider located at [facility] [address]  [address] [facility]  [phone]    Recent Visits  No visits were found meeting these conditions  Showing recent visits within past 7 days and meeting all other requirements     Today's Visits  Date Type Provider Dept   01/29/21 Telemedicine RILEY Andres Pg [facility] [place]   Showing today's visits and meeting all other requirements     Future Appointments  No visits were found meeting these conditions  Showing future appointments within next 150 days and meeting all other requirements        Patient agrees to participate in a virtual check in via telephone or video visit instead of presenting to the office to address urgent/immediate medical needs  Patient is aware this is a billable service  After connecting through Telephone, the patient was identified by name and date of birth  [facility] Payment was informed that this was a telemedicine visit and that the exam was being conducted confidentially over secure lines  My office door was closed  No one else was in the room  Corea Payment acknowledged consent and understanding of privacy and security of the telemedicine visit  I informed the patient that I have reviewed her record in Epic and presented the opportunity for her to ask any questions regarding the visit today  The patient agreed to participate  It was my intent to perform this visit via video technology but the patient was not able to do a video connection so the visit was completed via audio telephone only  Subjective:   Kaye Rodriguez is a 48 y o  female who is concerned about COVID-19  Patient is currently asymptomatic  Patient denies fever, chills, fatigue, malaise, congestion, rhinorrhea, sore throat, anosmia, loss of taste, cough, shortness of breath, chest tightness, abdominal pain, nausea, vomiting, diarrhea, myalgias and headaches  Exposure:   Contact with a person who is under investigation (PUI) for or who is positive for COVID-19 within the last 14 days?: Yes    Hospitalized recently for fever and/or lower respiratory symptoms?: No      Currently a healthcare worker that is involved in direct patient care?: No      Works in a special setting where the risk of COVID-19 transmission may be high? (this may include long-term care, correctional and [facility] facilities; homeless shelters; assisted-living facilities and group homes ): No      Resident in a special setting where the risk of COVID-19 transmission may be high? (this may include long-term care, correctional and [facility] facilities; homeless shelters; assisted-living facilities and group homes ): No      Lab Results   Component Value Date    SARSCOV2 Negative 01/20/2021    [address] [address] Not Detected 12/21/2020     Past Medical History:   Diagnosis Date    Allergic     Anxiety      Past Surgical History:   Procedure Laterality Date    NO PAST SURGERIES       Current Outpatient Medications   Medication Sig Dispense Refill    fluticasone (FLONASE) 50 mcg/act nasal spray 1 spray into each nostril daily 16 g 5     No current facility-administered medications for this visit        Allergies   Allergen Reactions    Measles Mumps And Rubella Virus Vaccine Live Arthralgia     Knee swelling       Review of Systems   Constitutional: Negative for chills, fatigue and fever  HENT: Negative for congestion, rhinorrhea and sore throat  Respiratory: Negative for cough, chest tightness and shortness of breath  Gastrointestinal: Negative for abdominal pain, diarrhea, nausea and vomiting  Musculoskeletal: Negative for myalgias  Neurological: Negative for headaches  Objective:    Vitals:    01/29/21 0912   Pulse: 98   Temp: 97 5 °F (36 4 °C)   SpO2: 99%       Physical Exam  VIRTUAL VISIT DISCLAIMER    Pauline Sheae acknowledges that she has consented to an online visit or consultation  She understands that the online visit is based solely on information provided by her, and that, in the absence of a face-to-face physical evaluation by the physician, the diagnosis she receives is both limited and provisional in terms of accuracy and completeness  This is not intended to replace a full medical face-to-face evaluation by the physician  Pauline Gutierrez understands and accepts these terms

## 2021-01-30 LAB — SARS-COV-2 RNA RESP QL NAA+PROBE: NEGATIVE

## 2021-02-17 ENCOUNTER — TELEMEDICINE (OUTPATIENT)
Dept: INTERNAL MEDICINE CLINIC | Facility: CLINIC | Age: 56
End: 2021-02-17
Payer: COMMERCIAL

## 2021-02-17 DIAGNOSIS — Z20.822 EXPOSURE TO COVID-19 VIRUS: Primary | ICD-10-CM

## 2021-02-17 PROCEDURE — U0003 INFECTIOUS AGENT DETECTION BY NUCLEIC ACID (DNA OR RNA); SEVERE ACUTE RESPIRATORY SYNDROME CORONAVIRUS 2 (SARS-COV-2) (CORONAVIRUS DISEASE [COVID-19]), AMPLIFIED PROBE TECHNIQUE, MAKING USE OF HIGH THROUGHPUT TECHNOLOGIES AS DESCRIBED BY CMS-2020-01-R: HCPCS | Performed by: FAMILY MEDICINE

## 2021-02-17 PROCEDURE — 99441 PR PHYS/QHP TELEPHONE EVALUATION 5-10 MIN: CPT | Performed by: FAMILY MEDICINE

## 2021-02-17 PROCEDURE — U0005 INFEC AGEN DETEC AMPLI PROBE: HCPCS | Performed by: FAMILY MEDICINE

## 2021-02-17 NOTE — PROGRESS NOTES
COVID-19 Virtual Visit     Assessment/Plan:    Problem List Items Addressed This Visit        Other    Exposure to COVID-19 virus - Primary    Relevant Orders    Novel Coronavirus (Covid-19),PCR [facility] - Collected in Office         Disposition:     I recommended the patient to come to our office to perform PCR testing for COVID-19  I have spent 5 minutes directly with the patient  Greater than 50% of this time was spent in counseling/coordination of care regarding: instructions for management, patient and family education and importance of treatment compliance  Encounter provider Clint Alexander MD    Provider located at [address] [place] [address]  [address] [facility] [address] [phone]    Recent Visits  No visits were found meeting these conditions  Showing recent visits within past 7 days and meeting all other requirements     Today's Visits  Date Type Provider Dept   02/17/21 Jerica Miller MD  Primary [facility] [place]   Showing today's visits and meeting all other requirements     Future Appointments  No visits were found meeting these conditions  Showing future appointments within next 150 days and meeting all other requirements        Patient agrees to participate in a virtual check in via telephone or video visit instead of presenting to the office to address urgent/immediate medical needs  Patient is aware this is a billable service  After connecting through Telephone, the patient was identified by name and date of birth  Pauline Gutierrez was informed that this was a telemedicine visit and that the exam was being conducted confidentially over secure lines  My office door was closed  No one else was in the room  Pauline Gutierrez acknowledged consent and understanding of privacy and security of the telemedicine visit   I informed the patient that I have reviewed her record in Epic and presented the opportunity for her to ask any questions regarding the visit today  The patient agreed to participate  It was my intent to perform this visit via video technology but the patient was not able to do a video connection so the visit was completed via audio telephone only  Subjective:   Claude Tadeo is a 48 y o  female who is concerned about COVID-19  Patient is currently asymptomatic  Patient denies fever, chills, fatigue, malaise, congestion, rhinorrhea, sore throat, anosmia, loss of taste, cough, shortness of breath, chest tightness, abdominal pain, nausea, vomiting, diarrhea, myalgias and headaches  Lab Results   Component Value Date    SARSCOV2 Negative 01/29/2021    [address] [facility] [address] Not Detected 12/21/2020     Past Medical History:   Diagnosis Date    Allergic     Anxiety      Past Surgical History:   Procedure Laterality Date    NO PAST SURGERIES       Current Outpatient Medications   Medication Sig Dispense Refill    fluticasone (FLONASE) 50 mcg/act nasal spray 1 spray into each nostril daily 16 g 5     No current facility-administered medications for this visit  Allergies   Allergen Reactions    Measles Mumps And Rubella Virus Vaccine Live Arthralgia     Knee swelling       Review of Systems   Constitutional: Negative for chills, fatigue and fever  HENT: Negative for congestion, rhinorrhea and sore throat  Respiratory: Negative for cough, chest tightness and shortness of breath  Gastrointestinal: Negative for abdominal pain, diarrhea, nausea and vomiting  Musculoskeletal: Negative for myalgias  Neurological: Negative for headaches  Objective: There were no vitals filed for this visit  Physical Exam  Constitutional:       Appearance: She is well-developed and well-groomed  Neurological:      Mental Status: She is alert  Psychiatric:         Behavior: Behavior is cooperative  VIRTUAL VISIT DISCLAIMER    Claude Tadeo acknowledges that she has consented to an online visit or consultation   She understands that the online visit is based solely on information provided by her, and that, in the absence of a face-to-face physical evaluation by the physician, the diagnosis she receives is both limited and provisional in terms of accuracy and completeness  This is not intended to replace a full medical face-to-face evaluation by the physician  Rafael Barclay understands and accepts these terms

## 2021-02-18 LAB — SARS-COV-2 RNA RESP QL NAA+PROBE: NEGATIVE

## 2021-03-03 ENCOUNTER — IMMUNIZATIONS (OUTPATIENT)
Dept: FAMILY MEDICINE CLINIC | Facility: HOSPITAL | Age: 56
End: 2021-03-03

## 2021-03-03 DIAGNOSIS — Z23 ENCOUNTER FOR IMMUNIZATION: Primary | ICD-10-CM

## 2021-03-03 PROCEDURE — 91301 SARS-COV-2 / COVID-19 MRNA VACCINE (MODERNA) 100 MCG: CPT

## 2021-03-03 PROCEDURE — 0012A SARS-COV-2 / COVID-19 MRNA VACCINE (MODERNA) 100 MCG: CPT

## 2021-07-22 ENCOUNTER — OFFICE VISIT (OUTPATIENT)
Dept: INTERNAL MEDICINE CLINIC | Facility: CLINIC | Age: 56
End: 2021-07-22
Payer: COMMERCIAL

## 2021-07-22 VITALS
DIASTOLIC BLOOD PRESSURE: 92 MMHG | BODY MASS INDEX: 34.67 KG/M2 | OXYGEN SATURATION: 97 % | HEIGHT: 65 IN | SYSTOLIC BLOOD PRESSURE: 146 MMHG | WEIGHT: 208.1 LBS | HEART RATE: 107 BPM

## 2021-07-22 DIAGNOSIS — M54.31 RIGHT SIDED SCIATICA: Primary | ICD-10-CM

## 2021-07-22 DIAGNOSIS — Z12.31 VISIT FOR SCREENING MAMMOGRAM: ICD-10-CM

## 2021-07-22 DIAGNOSIS — I10 ESSENTIAL HYPERTENSION: ICD-10-CM

## 2021-07-22 PROCEDURE — 99213 OFFICE O/P EST LOW 20 MIN: CPT | Performed by: FAMILY MEDICINE

## 2021-07-22 RX ORDER — PREDNISONE 10 MG/1
40 TABLET ORAL DAILY
Qty: 20 TABLET | Refills: 0 | Status: SHIPPED | OUTPATIENT
Start: 2021-07-22 | End: 2021-07-27

## 2021-07-22 NOTE — PROGRESS NOTES
Assessment/Plan:    No problem-specific Assessment & Plan notes found for this encounter  Diagnoses and all orders for this visit:    Right sided sciatica  -     predniSONE 10 mg tablet; Take 4 tablets (40 mg total) by mouth daily for 5 days  -     XR spine lumbar minimum 4 views non injury; Future  -     Ambulatory referral to Physical Therapy; Future    Essential hypertension    Visit for screening mammogram  -     Mammo screening bilateral w 3d & cad; Future        Orders recommendations as noted above  Back symptoms are persistent but improving somewhat  Discussed with her being very careful to avoid falls  Proper lifting discussed  Will check an x-ray because of the fall  Prednisone as noted above  Heat to the area  May continue with the topical analgesia  Consider physical therapy if symptoms persist or worsen  If symptoms worsen significantly with the right sciatic symptoms, may need MRI for further investigation in the future  Will have her follow-up for routine visit in about 4-6 weeks  She also needs a blood pressure check at that point since her blood pressure remains mildly elevated  Watch salt intake  Follow blood pressure at home if possible  Subjective:      Patient ID: Nic Price is a 48 y o  female  She presents for problem visit  She had been carrying laundry down the stairs and fell landing on her buttocks  Had some pain initially but symptoms worsened over the next few days with low back pain especially to the right side which radiated into the buttocks and at times  Into the right foot   She notices the pain has improved slightly but never resolved  Difficulty with changes in position at times  Pain seems to worsen and extended into the foot especially if she standing or walking for any extended period  Some numbness and tingling into the right foot with exertion  Denies any weakness  Has been using over-the-counter  Topical analgesia with some relief  Minimal relief with ibuprofen  Denies any bowel or bladder dysfunction denies any headaches or localized weakness  The following portions of the patient's history were reviewed and updated as appropriate:   She  has a past medical history of Allergic and Anxiety  She   Patient Active Problem List    Diagnosis Date Noted    Right sided sciatica 07/22/2021    Exposure to COVID-19 virus 01/29/2021    Acute non-recurrent sinusitis 12/21/2020    Laryngitis 12/21/2020    Low TSH level 07/30/2019    Reactive depression 07/09/2019    Mixed hyperlipidemia 06/06/2018    Essential hypertension 06/06/2018    Screening for colon cancer 06/06/2018    Anxiety 06/06/2018    Allergic rhinitis 06/06/2018     She  has a past surgical history that includes No past surgeries  Her family history includes Heart disease in her mother; Other in her father  She  reports that she has never smoked  She has never used smokeless tobacco  She reports that she does not drink alcohol and does not use drugs  Current Outpatient Medications   Medication Sig Dispense Refill    fluticasone (FLONASE) 50 mcg/act nasal spray 1 spray into each nostril daily 16 g 5    predniSONE 10 mg tablet Take 4 tablets (40 mg total) by mouth daily for 5 days 20 tablet 0     No current facility-administered medications for this visit  Current Outpatient Medications on File Prior to Visit   Medication Sig    fluticasone (FLONASE) 50 mcg/act nasal spray 1 spray into each nostril daily     No current facility-administered medications on file prior to visit  She is allergic to measles mumps and rubella virus vaccine live       Review of Systems   Constitutional: Positive for activity change  Negative for chills and fever  Respiratory: Negative for cough and shortness of breath  Genitourinary: Negative for dysuria and flank pain  Musculoskeletal: Positive for back pain and gait problem  Neurological: Positive for numbness   Negative for headaches  Objective:      /92 (BP Location: Left arm, Patient Position: Sitting, Cuff Size: Large)   Pulse (!) 107   Ht 5' 5" (1 651 m)   Wt 94 4 kg (208 lb 1 6 oz)   SpO2 97%   BMI 34 63 kg/m²          Physical Exam  Vitals and nursing note reviewed  Constitutional:       Appearance: She is well-developed, well-groomed and overweight  Cardiovascular:      Rate and Rhythm: Normal rate and regular rhythm  Musculoskeletal:      Comments:   Tenderness over the right low back area and into the right buttocks; positive straight leg raise on the right at approximately 40°; sensation intact bilateral lower extremities   Neurological:      Mental Status: She is alert

## 2021-08-07 ENCOUNTER — HOSPITAL ENCOUNTER (OUTPATIENT)
Dept: RADIOLOGY | Facility: HOSPITAL | Age: 56
Discharge: HOME | End: 2021-08-07
Payer: COMMERCIAL

## 2021-08-07 DIAGNOSIS — M54.31 RIGHT SIDED SCIATICA: ICD-10-CM

## 2021-08-07 PROCEDURE — 72110 X-RAY EXAM L-2 SPINE 4/>VWS: CPT

## 2021-08-10 NOTE — LETTER
August 29, 2019     Patient: Farhat Ocampo   YOB: 1965   Date of Visit: 8/29/2019       To Whom it May Concern:    Farhat Ocampo is under my professional care  She was seen in my office on 8/29/2019  She will be re-evaluated in about 2 weeks to assess ability to return to work  Medications currently being adjusted  If you have any questions or concerns, please don't hesitate to call           Sincerely,          Liz Monroy MD        CC: No Recipients Attending with

## 2021-09-09 ENCOUNTER — APPOINTMENT (OUTPATIENT)
Dept: LAB | Facility: HOSPITAL | Age: 56
End: 2021-09-09

## 2021-09-09 DIAGNOSIS — Z00.8 HEALTH EXAMINATION IN POPULATION SURVEY: ICD-10-CM

## 2021-09-09 LAB
CHOLEST SERPL-MCNC: 214 MG/DL (ref 50–200)
EST. AVERAGE GLUCOSE BLD GHB EST-MCNC: 117 MG/DL
HBA1C MFR BLD: 5.7 %
HDLC SERPL-MCNC: 63 MG/DL
LDLC SERPL CALC-MCNC: 135 MG/DL (ref 0–100)
NONHDLC SERPL-MCNC: 151 MG/DL
TRIGL SERPL-MCNC: 81 MG/DL

## 2021-09-09 PROCEDURE — 83036 HEMOGLOBIN GLYCOSYLATED A1C: CPT

## 2021-09-09 PROCEDURE — 80061 LIPID PANEL: CPT

## 2021-09-09 PROCEDURE — 36415 COLL VENOUS BLD VENIPUNCTURE: CPT

## 2021-09-19 NOTE — PROGRESS NOTES
[facility]    NAME: Corrin Cabot  AGE: 55 y o  SEX: female  : 1965     DATE: 2021     Assessment and Plan:     Problem List Items Addressed This Visit        Respiratory    Allergic rhinitis    Relevant Medications    fluticasone (FLONASE) 50 mcg/act nasal spray       Cardiovascular and Mediastinum    Essential hypertension    Relevant Medications    valsartan (DIOVAN) 160 mg tablet    Other Relevant Orders    Comprehensive metabolic panel       Nervous and Auditory    Right sided sciatica       Other    Mixed hyperlipidemia    Relevant Orders    Comprehensive metabolic panel      Other Visit Diagnoses     Annual physical exam    -  Primary    Visit for screening mammogram        Relevant Orders    Mammo screening bilateral w 3d & cad    Tachycardia        Relevant Orders    TSH, 3rd generation         orders and recommendations as noted above  Blood pressure significantly elevated  Heart rate somewhat elevated as well  Laboratory testing as noted above  Advised her to wait about 4-6 weeks to have this done after starting blood pressure medication  Will start her on the valsartan as noted above  Follow blood pressure at home  Recommend either following blood pressure at home or recheck of blood pressure in the office in about 2-3 weeks  Cholesterol remains persistently elevated with an LDL above 130  She wishes to hold off on cholesterol medication  Dietary changes discussed  Increase fiber in diet  Prescription given for the Flonase  Advised her to use this regularly to help with the allergy symptoms  Watch for any worsening of the sciatic symptoms  Continue with the Mobic  Continue follow-up with dentist and eye doctor regularly  Colorectal cancer screening recommended and she will consider this    Slip given for her mammogram   Will have her follow up in about 3-5 months or sooner if needed depending on her blood pressure readings at home  Immunizations and preventive care screenings were discussed with patient today  Appropriate education was printed on patient's after visit summary  Counseling:  Alcohol/drug use: discussed moderation in alcohol intake, the recommendations for healthy alcohol use, and avoidance of illicit drug use  Dental Health: discussed importance of regular tooth brushing, flossing, and dental visits  Injury prevention: discussed safety/seat belts, safety helmets, smoke detectors, carbon dioxide detectors, and smoking near bedding or upholstery  Sexual health: discussed sexually transmitted diseases, partner selection, use of condoms, avoidance of unintended pregnancy, and contraceptive alternatives  · Exercise: the importance of regular exercise/physical activity was discussed  Recommend exercise 3-5 times per week for at least 30 minutes  BMI Counseling: Body mass index is 35 2 kg/m²  The BMI is above normal  Nutrition recommendations include encouraging healthy choices of fruits and vegetables, consuming healthier snacks, limiting drinks that contain sugar, moderation in carbohydrate intake and reducing intake of cholesterol  Exercise recommendations include exercising 3-5 times per week  Rationale for BMI follow-up plan is due to patient being overweight or obese  No follow-ups on file  Chief Complaint:     Chief Complaint   Patient presents with    Follow-up     No complaints      History of Present Illness:     Adult Annual Physical   Patient here for a comprehensive physical exam  The patient reports problems - See below  She presents for wellness visit  Has generally been doing relatively well  Back symptoms have improved  Mobic has helped significantly  Continues with some symptoms especially if she is walking or standing for prolonged periods    She has been having some increased allergy symptoms especially with watering and itching of her eyes and nasal congestion  Has not been using her Flonase regularly  Does need a refill  Denies any headaches or localized weakness  Blood pressure was significantly elevated today  Denies any chest pain or palpitations  Denies any significant shortness of breath  Denies any significant headaches  She admits to being under more stress both at home and at work  Appetite has been stable  Denies any nausea, vomiting, or diarrhea  Denies any changes in bowel movements  Sleeping relatively well  Diet and Physical Activity  · Diet/Nutrition: well balanced diet and frequent junk food  · Exercise: walking  Depression Screening  PHQ-9 Depression Screening    PHQ-9:   Frequency of the following problems over the past two weeks:           General Health  · Sleep: gets 7-8 hours of sleep on average  · Hearing: normal - bilateral   · Vision: no vision problems  · Dental: regular dental visits  /GYN Health  · Patient is: postmenopausal  · Last menstrual period:    · Contraceptive method:        Review of Systems:     Review of Systems   Constitutional: Negative for activity change, appetite change, chills and fever  HENT: Positive for congestion  Negative for rhinorrhea  Eyes: Positive for redness and itching  Negative for visual disturbance  Respiratory: Negative for chest tightness and shortness of breath  Cardiovascular: Negative for chest pain and palpitations  Gastrointestinal: Negative for abdominal pain, blood in stool, diarrhea, nausea and vomiting  Endocrine: Negative for polydipsia, polyphagia and polyuria  Genitourinary: Negative for dysuria, frequency and urgency  Musculoskeletal: Positive for back pain  Negative for gait problem  Skin: Negative for color change  Allergic/Immunologic: Positive for environmental allergies  Neurological: Negative for dizziness and headaches  Hematological: Does not bruise/bleed easily     Psychiatric/Behavioral: Negative for confusion and sleep disturbance  The patient is not nervous/anxious  Past Medical History:     Past Medical History:   Diagnosis Date    Allergic     Anxiety       Past Surgical History:     Past Surgical History:   Procedure Laterality Date    NO PAST SURGERIES        Social History:     Social History     Socioeconomic History    Marital status: /Civil Union     Spouse name: None    Number of children: None    Years of education: None    Highest education level: None   Occupational History    None   Tobacco Use    Smoking status: Never Smoker    Smokeless tobacco: Never Used   Substance and Sexual Activity    Alcohol use: No    Drug use: No    Sexual activity: None   Other Topics Concern    None   Social History Narrative    None     Social Determinants of Health     Financial Resource Strain:     Difficulty of Paying Living Expenses:    Food Insecurity:     Worried About Running Out of Food in the Last Year:     Ran Out of Food in the Last Year:    Transportation Needs:     Lack of Transportation (Medical):      Lack of Transportation (Non-Medical):    Physical Activity:     Days of Exercise per Week:     Minutes of Exercise per Session:    Stress:     Feeling of Stress :    Social Connections:     Frequency of Communication with Friends and Family:     Frequency of Social Gatherings with Friends and Family:     Attends [employer]ism Services:     Active Member of Clubs or Organizations:     Attends Club or Organization Meetings:     Marital Status:    Intimate Partner Violence:     Fear of Current or Ex-Partner:     Emotionally Abused:     Physically Abused:     Sexually Abused:       Family History:     Family History   Problem Relation Age of Onset    Heart disease Mother     Other Father       Current Medications:     Current Outpatient Medications   Medication Sig Dispense Refill    fluticasone (FLONASE) 50 mcg/act nasal spray 1 spray into each nostril daily 16 g 5  meloxicam (MOBIC) 15 mg tablet Take 1 tablet (15 mg total) by mouth daily 30 tablet 5    valsartan (DIOVAN) 160 mg tablet Take 1 tablet (160 mg total) by mouth daily 90 tablet 1     No current facility-administered medications for this visit  Allergies: Allergies   Allergen Reactions    Measles Mumps And Rubella Virus Vaccine Live Arthralgia     Knee swelling      Physical Exam:     BP (!) 182/110 (BP Location: Left arm, Patient Position: Sitting, Cuff Size: Large)   Pulse (!) 117   Temp 98 2 °F (36 8 °C)   Ht 5' 5" (1 651 m)   Wt 95 9 kg (211 lb 8 oz)   SpO2 99%   BMI 35 20 kg/m²     Physical Exam  Vitals and nursing note reviewed  Constitutional:       General: She is not in acute distress  Appearance: She is well-developed, well-groomed and overweight  HENT:      Head: Normocephalic and atraumatic  Comments: Boggy nasal mucosa with clear nasal discharge  Eyes:      Conjunctiva/sclera:      Right eye: Right conjunctiva is injected  Left eye: Left conjunctiva is injected  Cardiovascular:      Rate and Rhythm: Normal rate and regular rhythm  Heart sounds: No murmur heard  Pulmonary:      Effort: Pulmonary effort is normal  No respiratory distress  Breath sounds: Normal breath sounds  Abdominal:      Palpations: Abdomen is soft  Tenderness: There is no abdominal tenderness  Musculoskeletal:      Cervical back: Neck supple  Lymphadenopathy:      Cervical: No cervical adenopathy  Skin:     General: Skin is warm and dry  Neurological:      Mental Status: She is alert  Psychiatric:         Mood and Affect: Mood and affect normal          Speech: Speech normal          Behavior: Behavior is cooperative            Grace Pierre MD  [address] [place] [address],[address]

## 2021-09-19 NOTE — PATIENT INSTRUCTIONS
Wellness Visit for Adults   AMBULATORY CARE:   A wellness visit  is when you see your healthcare provider to get screened for health problems  Your healthcare provider will also give you advice on how to stay healthy  Write down your questions so you remember to ask them  Ask your healthcare provider how often you should have a wellness visit  What happens at a wellness visit:  Your healthcare provider will ask about your health, and your family history of health problems  This includes high blood pressure, heart disease, and cancer  He or she will ask if you have symptoms that concern you, if you smoke, and about your mood  You may also be asked about your intake of medicines, supplements, food, and alcohol  Any of the following may be done:  · Your weight  will be checked  Your height may also be checked so your body mass index (BMI) can be calculated  Your BMI shows if you are at a healthy weight  · Your blood pressure  and heart rate will be checked  Your temperature may also be checked  · Blood and urine tests  may be done  Blood tests may be done to check your cholesterol levels  Abnormal cholesterol levels increase your risk for heart disease and stroke  You may also need a blood or urine test to check for diabetes if you are at increased risk  Urine tests may be done to look for signs of an infection or kidney disease  · A physical exam  includes checking your heartbeat and lungs with a stethoscope  Your healthcare provider may also check your skin to look for sun damage  · Screening tests  may be recommended  A screening test is done to check for diseases that may not cause symptoms  The screening tests you may need depend on your age, gender, family history, and lifestyle habits  For example, colorectal screening may be recommended if you are 48years old or older  Screening tests you need if you are a woman:   · A Pap smear  is used to screen for cervical cancer   Pap smears are usually done every 3 to 5 years depending on your age  You may need them more often if you have had abnormal Pap smear test results in the past  Ask your healthcare provider how often you should have a Pap smear  · A mammogram  is an x-ray of your breasts to screen for breast cancer  Experts recommend mammograms every 2 years starting at age 48 years  You may need a mammogram at age 52 years or younger if you have an increased risk for breast cancer  Talk to your healthcare provider about when you should start having mammograms and how often you need them  Vaccines you may need:   · Get an influenza vaccine  every year  The influenza vaccine protects you from the flu  Several types of viruses cause the flu  The viruses change over time, so new vaccines are made each year  · Get a tetanus-diphtheria (Td) booster vaccine  every 10 years  This vaccine protects you against tetanus and diphtheria  Tetanus is a severe infection that may cause painful muscle spasms and lockjaw  Diphtheria is a severe bacterial infection that causes a thick covering in the back of your mouth and throat  · Get a human papillomavirus (HPV) vaccine  if you are female and aged 23 to 32 or male 23 to 24 and never received it  This vaccine protects you from HPV infection  HPV is the most common infection spread by sexual contact  HPV may also cause vaginal, penile, and anal cancers  · Get a pneumococcal vaccine  if you are aged 72 years or older  The pneumococcal vaccine is an injection given to protect you from pneumococcal disease  Pneumococcal disease is an infection caused by pneumococcal bacteria  The infection may cause pneumonia, meningitis, or an ear infection  · Get a shingles vaccine  if you are 60 or older, even if you have had shingles before  The shingles vaccine is an injection to protect you from the varicella-zoster virus  This is the same virus that causes chickenpox   Shingles is a painful rash that develops in people who had chickenpox or have been exposed to the virus  How to eat healthy:  My Plate is a model for planning healthy meals  It shows the types and amounts of foods that should go on your plate  Fruits and vegetables make up about half of your plate, and grains and protein make up the other half  A serving of dairy is included on the side of your plate  The amount of calories and serving sizes you need depends on your age, gender, weight, and height  Examples of healthy foods are listed below:  · Eat a variety of vegetables  such as dark green, red, and orange vegetables  You can also include canned vegetables low in sodium (salt) and frozen vegetables without added butter or sauces  · Eat a variety of fresh fruits , canned fruit in 100% juice, frozen fruit, and dried fruit  · Include whole grains  At least half of the grains you eat should be whole grains  Examples include whole-wheat bread, wheat pasta, brown rice, and whole-grain cereals such as oatmeal     · Eat a variety of protein foods such as seafood (fish and shellfish), lean meat, and poultry without skin (turkey and chicken)  Examples of lean meats include pork leg, shoulder, or tenderloin, and beef round, sirloin, tenderloin, and extra lean ground beef  Other protein foods include eggs and egg substitutes, beans, peas, soy products, nuts, and seeds  · Choose low-fat dairy products such as skim or 1% milk or low-fat yogurt, cheese, and cottage cheese  · Limit unhealthy fats  such as butter, hard margarine, and shortening  Exercise:  Exercise at least 30 minutes per day on most days of the week  Some examples of exercise include walking, biking, dancing, and swimming  You can also fit in more physical activity by taking the stairs instead of the elevator or parking farther away from stores  Include muscle strengthening activities 2 days each week  Regular exercise provides many health benefits   It helps you manage your weight, and decreases your risk for type 2 diabetes, heart disease, stroke, and high blood pressure  Exercise can also help improve your mood  Ask your healthcare provider about the best exercise plan for you  General health and safety guidelines:   · Do not smoke  Nicotine and other chemicals in cigarettes and cigars can cause lung damage  Ask your healthcare provider for information if you currently smoke and need help to quit  E-cigarettes or smokeless tobacco still contain nicotine  Talk to your healthcare provider before you use these products  · Limit alcohol  A drink of alcohol is 12 ounces of beer, 5 ounces of wine, or 1½ ounces of liquor  · Lose weight, if needed  Being overweight increases your risk of certain health conditions  These include heart disease, high blood pressure, type 2 diabetes, and certain types of cancer  · Protect your skin  Do not sunbathe or use tanning beds  Use sunscreen with a SPF 15 or higher  Apply sunscreen at least 15 minutes before you go outside  Reapply sunscreen every 2 hours  Wear protective clothing, hats, and sunglasses when you are outside  · Drive safely  Always wear your seatbelt  Make sure everyone in your car wears a seatbelt  A seatbelt can save your life if you are in an accident  Do not use your cell phone when you are driving  This could distract you and cause an accident  Pull over if you need to make a call or send a text message  · Practice safe sex  Use latex condoms if are sexually active and have more than one partner  Your healthcare provider may recommend screening tests for sexually transmitted infections (STIs)  · Wear helmets, lifejackets, and protective gear  Always wear a helmet when you ride a bike or motorcycle, go skiing, or play sports that could cause a head injury  Wear protective equipment when you play sports  Wear a lifejacket when you are on a boat or doing water sports      © Copyright [employer] 2021 Information is for End User's use only and may not be sold, redistributed or otherwise used for commercial purposes  All illustrations and images included in CareNotes® are the copyrighted property of A D A M [employer] Inc  or Facundo [employer]  The above information is an  only  It is not intended as medical advice for individual conditions or treatments  Talk to your doctor, nurse or pharmacist before following any medical regimen to see if it is safe and effective for you  Hypertension   WHAT YOU NEED TO KNOW:   What is hypertension? Hypertension is high blood pressure  Your blood pressure is the force of your blood moving against the walls of your arteries  Hypertension causes your blood pressure to get so high that your heart has to work much harder than normal  This can damage your heart  The cause of hypertension may not be known  This is called essential or primary hypertension  Hypertension caused by another medical condition, such as kidney disease, is called secondary hypertension  What do I need to know about the stages of hypertension? · Normal blood pressure is 119/79 or lower   Your healthcare provider may only check your blood pressure each year if it stays at a normal level  · Elevated blood pressure is 120/79 to 129/79   This is sometimes called prehypertension  Your healthcare provider may suggest lifestyle changes to help lower your blood pressure to a normal level  He or she may then check it again in 3 to 6 months  · Stage 1 hypertension is 130/80  to 139/89   Your provider may recommend lifestyle changes, medication, and checks every 3 to 6 months until your blood pressure is controlled  · Stage 2 hypertension is 140/90 or higher   Your provider will recommend lifestyle changes and have you take 2 kinds of hypertension medicines  You will also need to have your blood pressure checked monthly until it is controlled  What increases my risk for hypertension?    · Age older than 54 years (men) or 72 (women)    · Stress, or a family history of hypertension or heart disease    · Obesity, lack of exercise, or too many high-sodium foods    · Use of tobacco, alcohol, or illegal drugs    · A medical condition, such as diabetes, kidney disease, thyroid disease, or adrenal gland disorder    · Certain medicines, such as steroids or birth control pills    What are the signs and symptoms of hypertension? You may have no signs or symptoms, or you may have any of the following:  · Headache    · Blurred vision    · Chest pain    · Dizziness or weakness    · Trouble breathing    · Nosebleeds    How is hypertension diagnosed? Your healthcare provider will take your blood pressure at several visits  You may also need to check your blood pressure at home  The provider will examine you and ask what medicines you take  He or she will also ask if you have a family history of high blood pressure and about any health conditions you have  He or she will also check your blood pressure and weight and examine your heart, lungs, and eyes  You may need any of the following tests:  · An ambulatory blood pressure monitor (ABPM)  is a device that you wear  ABPM measures your blood pressure while you do your regular daily activities  It records your blood pressure every 15 to 30 minutes during the day  It also records your blood pressure every 15 minutes to 1 hour at night  The recorded blood pressures help your healthcare provider know if you have hypertension not seen at your appointment  · Blood tests  may help healthcare providers find the cause of your hypertension  Blood tests can also help find other health problems caused by hypertension  · Urine tests  will be done to check your kidney function  Kidney problems can increase your risk for hypertension  Which medicines are used to treat hypertension? · Antihypertensives  may be used to help lower your blood pressure   Several kinds of medicines are available  Your healthcare provider will choose medicines based on the kind of hypertension you have  You may need more than one type of medicine  Take the medicine exactly as directed  · Diuretics  help decrease extra fluid that collects in your body  This will help lower your blood pressure  You may urinate more often while you take this medicine  · Cholesterol medicine  helps lower your cholesterol level  A low cholesterol level helps prevent heart disease and makes it easier to control your blood pressure  What can I do to manage hypertension? · Check your blood pressure at home  Avoid smoking, caffeine, and exercise at least 30 minutes before checking your blood pressure  Sit and rest for 5 minutes before you take your blood pressure  Extend your arm and support it on a flat surface  Your arm should be at the same level as your heart  Follow the directions that came with your blood pressure monitor  Check your blood pressure 2 times, 1 minute apart, before you take your medicine in the morning  Also check your blood pressure before your evening meal  Keep a record of your readings and bring it to your follow-up visits  Ask your healthcare provider what your blood pressure should be  · Manage any other health conditions you have  Health conditions such as diabetes can increase your risk for hypertension  Follow your healthcare provider's instructions and take all your medicines as directed  · Ask about all medicines  Certain medicines can increase your blood pressure  Examples include oral birth control pills, decongestants, herbal supplements, and NSAIDs, such as ibuprofen  Your healthcare provider can tell you which medicines are safe for you to take  This includes prescription and over-the-counter medicines  What lifestyle changes can I make to manage hypertension? Your healthcare provider may recommend you work with a team to manage hypertension   The team may include medical experts such as a dietitian, an exercise or physical therapist, and a behavior therapist  Your family members may be included in helping you create lifestyle changes  · Limit sodium (salt) as directed  Too much sodium can affect your fluid balance  Check labels to find low-sodium or no-salt-added foods  Some low-sodium foods use potassium salts for flavor  Too much potassium can also cause health problems  Your healthcare provider will tell you how much sodium and potassium are safe for you to have in a day  He or she may recommend that you limit sodium to 2,300 mg a day  · Follow the meal plan recommended by your healthcare provider  A dietitian or your provider can give you more information on low-sodium plans or the DASH (Dietary Approaches to Stop Hypertension) eating plan  The DASH plan is low in sodium, processed sugar, unhealthy fats, and total fat  It is high in potassium, calcium, and fiber  These can be found in vegetables, fruit, and whole-grain foods  · Be physically active throughout the day  Physical activity, such as exercise, can help control your blood pressure and your weight  Be physically active for at least 30 minutes per day, on most days of the week  Include aerobic activity, such as walking or riding a bicycle  Also include strength training at least 2 times each week  Your healthcare providers can help you create a physical activity plan  · Decrease stress  This may help lower your blood pressure  Learn ways to relax, such as deep breathing or listening to music  · Limit alcohol as directed  Alcohol can increase your blood pressure  A drink of alcohol is 12 ounces of beer, 5 ounces of wine, or 1½ ounces of liquor  · Do not smoke  Nicotine and other chemicals in cigarettes and cigars can increase your blood pressure and also cause lung damage  Ask your healthcare provider for information if you currently smoke and need help to quit   E-cigarettes or smokeless tobacco still contain nicotine  Talk to your healthcare provider before you use these products  Call your local emergency number (911 in the [address]) or have someone call if:   · You have chest pain  · You have any of the following signs of a heart attack:      ? Squeezing, pressure, or pain in your chest    ? You may  also have any of the following:     § Discomfort or pain in your back, neck, jaw, stomach, or arm    § Shortness of breath    § Nausea or vomiting    § Lightheadedness or a sudden cold sweat    · You become confused or have trouble speaking  · You suddenly feel lightheaded or have trouble breathing  When should I seek immediate care? · You have a severe headache or vision loss  · You have weakness in an arm or leg  When should I call my doctor? · You feel faint, dizzy, confused, or drowsy  · You have been taking your blood pressure medicine but your pressure is higher than your provider says it should be  · You have questions or concerns about your condition or care  CARE AGREEMENT:   You have the right to help plan your care  Learn about your health condition and how it may be treated  Discuss treatment options with your healthcare providers to decide what care you want to receive  You always have the right to refuse treatment  The above information is an  only  It is not intended as medical advice for individual conditions or treatments  Talk to your doctor, nurse or pharmacist before following any medical regimen to see if it is safe and effective for you  © Copyright [employer] 2021 Information is for End User's use only and may not be sold, redistributed or otherwise used for commercial purposes   All illustrations and images included in CareNotes® are the copyrighted property of [employer]  or [facility] [employer]

## 2021-09-20 ENCOUNTER — OFFICE VISIT (OUTPATIENT)
Dept: INTERNAL MEDICINE CLINIC | Facility: CLINIC | Age: 56
End: 2021-09-20
Payer: COMMERCIAL

## 2021-09-20 VITALS
BODY MASS INDEX: 35.24 KG/M2 | SYSTOLIC BLOOD PRESSURE: 182 MMHG | WEIGHT: 211.5 LBS | HEART RATE: 117 BPM | HEIGHT: 65 IN | TEMPERATURE: 98.2 F | OXYGEN SATURATION: 99 % | DIASTOLIC BLOOD PRESSURE: 110 MMHG

## 2021-09-20 DIAGNOSIS — E78.2 MIXED HYPERLIPIDEMIA: ICD-10-CM

## 2021-09-20 DIAGNOSIS — M54.31 RIGHT SIDED SCIATICA: ICD-10-CM

## 2021-09-20 DIAGNOSIS — I10 ESSENTIAL HYPERTENSION: ICD-10-CM

## 2021-09-20 DIAGNOSIS — J30.9 ALLERGIC RHINITIS, UNSPECIFIED SEASONALITY, UNSPECIFIED TRIGGER: ICD-10-CM

## 2021-09-20 DIAGNOSIS — Z00.00 ANNUAL PHYSICAL EXAM: Primary | ICD-10-CM

## 2021-09-20 DIAGNOSIS — R00.0 TACHYCARDIA: ICD-10-CM

## 2021-09-20 DIAGNOSIS — Z12.31 VISIT FOR SCREENING MAMMOGRAM: ICD-10-CM

## 2021-09-20 PROBLEM — J04.0 LARYNGITIS: Status: RESOLVED | Noted: 2020-12-21 | Resolved: 2021-09-20

## 2021-09-20 PROBLEM — J01.90 ACUTE NON-RECURRENT SINUSITIS: Status: RESOLVED | Noted: 2020-12-21 | Resolved: 2021-09-20

## 2021-09-20 PROBLEM — Z20.822 EXPOSURE TO COVID-19 VIRUS: Status: RESOLVED | Noted: 2021-01-29 | Resolved: 2021-09-20

## 2021-09-20 PROCEDURE — 99396 PREV VISIT EST AGE 40-64: CPT | Performed by: FAMILY MEDICINE

## 2021-09-20 RX ORDER — VALSARTAN 160 MG/1
160 TABLET ORAL DAILY
Qty: 90 TABLET | Refills: 1 | Status: SHIPPED | OUTPATIENT
Start: 2021-09-20 | End: 2021-12-07 | Stop reason: SDUPTHER

## 2021-09-20 RX ORDER — FLUTICASONE PROPIONATE 50 MCG
1 SPRAY, SUSPENSION (ML) NASAL DAILY
Qty: 16 G | Refills: 5 | Status: SHIPPED | OUTPATIENT
Start: 2021-09-20

## 2021-09-30 ENCOUNTER — TELEPHONE (OUTPATIENT)
Dept: INTERNAL MEDICINE CLINIC | Facility: CLINIC | Age: 56
End: 2021-09-30

## 2021-10-01 ENCOUNTER — TELEPHONE (OUTPATIENT)
Dept: INTERNAL MEDICINE CLINIC | Facility: CLINIC | Age: 56
End: 2021-10-01

## 2021-10-01 DIAGNOSIS — M54.31 RIGHT SIDED SCIATICA: Primary | ICD-10-CM

## 2021-10-01 RX ORDER — CYCLOBENZAPRINE HCL 10 MG
10 TABLET ORAL
Qty: 30 TABLET | Refills: 0 | Status: SHIPPED | OUTPATIENT
Start: 2021-10-01 | End: 2022-03-22

## 2021-10-25 ENCOUNTER — OFFICE VISIT (OUTPATIENT)
Dept: PHYSICAL THERAPY | Facility: HOME HEALTHCARE | Age: 56
End: 2021-10-25
Payer: COMMERCIAL

## 2021-10-25 DIAGNOSIS — M54.31 RIGHT SIDED SCIATICA: Primary | ICD-10-CM

## 2021-10-25 PROCEDURE — 97162 PT EVAL MOD COMPLEX 30 MIN: CPT | Performed by: PHYSICAL THERAPIST

## 2021-10-25 PROCEDURE — 97140 MANUAL THERAPY 1/> REGIONS: CPT | Performed by: PHYSICAL THERAPIST

## 2021-10-26 ENCOUNTER — TELEPHONE (OUTPATIENT)
Dept: INTERNAL MEDICINE CLINIC | Facility: CLINIC | Age: 56
End: 2021-10-26

## 2021-10-28 ENCOUNTER — OFFICE VISIT (OUTPATIENT)
Dept: PHYSICAL THERAPY | Facility: HOME HEALTHCARE | Age: 56
End: 2021-10-28
Payer: COMMERCIAL

## 2021-10-28 DIAGNOSIS — M54.31 RIGHT SIDED SCIATICA: Primary | ICD-10-CM

## 2021-10-28 PROCEDURE — 97140 MANUAL THERAPY 1/> REGIONS: CPT

## 2021-10-28 PROCEDURE — 97110 THERAPEUTIC EXERCISES: CPT

## 2021-11-01 ENCOUNTER — APPOINTMENT (OUTPATIENT)
Dept: PHYSICAL THERAPY | Facility: HOME HEALTHCARE | Age: 56
End: 2021-11-01
Payer: COMMERCIAL

## 2021-11-01 ENCOUNTER — OFFICE VISIT (OUTPATIENT)
Dept: PHYSICAL THERAPY | Facility: HOME HEALTHCARE | Age: 56
End: 2021-11-01
Payer: COMMERCIAL

## 2021-11-01 DIAGNOSIS — M54.31 RIGHT SIDED SCIATICA: Primary | ICD-10-CM

## 2021-11-01 PROCEDURE — 97110 THERAPEUTIC EXERCISES: CPT

## 2021-11-01 PROCEDURE — 97140 MANUAL THERAPY 1/> REGIONS: CPT

## 2021-11-01 PROCEDURE — 97112 NEUROMUSCULAR REEDUCATION: CPT

## 2021-11-04 ENCOUNTER — OFFICE VISIT (OUTPATIENT)
Dept: PHYSICAL THERAPY | Facility: HOME HEALTHCARE | Age: 56
End: 2021-11-04
Payer: COMMERCIAL

## 2021-11-04 DIAGNOSIS — M54.31 RIGHT SIDED SCIATICA: Primary | ICD-10-CM

## 2021-11-04 PROCEDURE — 97112 NEUROMUSCULAR REEDUCATION: CPT

## 2021-11-04 PROCEDURE — 97110 THERAPEUTIC EXERCISES: CPT

## 2021-11-08 ENCOUNTER — OFFICE VISIT (OUTPATIENT)
Dept: PHYSICAL THERAPY | Facility: HOME HEALTHCARE | Age: 56
End: 2021-11-08
Payer: COMMERCIAL

## 2021-11-08 DIAGNOSIS — M54.31 RIGHT SIDED SCIATICA: Primary | ICD-10-CM

## 2021-11-08 PROCEDURE — 97112 NEUROMUSCULAR REEDUCATION: CPT | Performed by: PHYSICAL THERAPIST

## 2021-11-08 PROCEDURE — 97110 THERAPEUTIC EXERCISES: CPT | Performed by: PHYSICAL THERAPIST

## 2021-11-11 ENCOUNTER — OFFICE VISIT (OUTPATIENT)
Dept: PHYSICAL THERAPY | Facility: HOME HEALTHCARE | Age: 56
End: 2021-11-11
Payer: COMMERCIAL

## 2021-11-11 DIAGNOSIS — M54.31 RIGHT SIDED SCIATICA: Primary | ICD-10-CM

## 2021-11-11 PROCEDURE — 97112 NEUROMUSCULAR REEDUCATION: CPT

## 2021-11-11 PROCEDURE — 97110 THERAPEUTIC EXERCISES: CPT

## 2021-11-15 ENCOUNTER — OFFICE VISIT (OUTPATIENT)
Dept: PHYSICAL THERAPY | Facility: HOME HEALTHCARE | Age: 56
End: 2021-11-15
Payer: COMMERCIAL

## 2021-11-15 DIAGNOSIS — M54.31 RIGHT SIDED SCIATICA: Primary | ICD-10-CM

## 2021-11-15 PROCEDURE — 97110 THERAPEUTIC EXERCISES: CPT

## 2021-11-15 PROCEDURE — 97112 NEUROMUSCULAR REEDUCATION: CPT

## 2021-11-18 ENCOUNTER — APPOINTMENT (OUTPATIENT)
Dept: PHYSICAL THERAPY | Facility: HOME HEALTHCARE | Age: 56
End: 2021-11-18
Payer: COMMERCIAL

## 2021-11-22 ENCOUNTER — OFFICE VISIT (OUTPATIENT)
Dept: PHYSICAL THERAPY | Facility: HOME HEALTHCARE | Age: 56
End: 2021-11-22
Payer: COMMERCIAL

## 2021-11-22 DIAGNOSIS — M54.31 RIGHT SIDED SCIATICA: Primary | ICD-10-CM

## 2021-11-22 PROCEDURE — 97110 THERAPEUTIC EXERCISES: CPT

## 2021-11-22 PROCEDURE — 97112 NEUROMUSCULAR REEDUCATION: CPT

## 2021-11-29 ENCOUNTER — APPOINTMENT (OUTPATIENT)
Dept: PHYSICAL THERAPY | Facility: HOME HEALTHCARE | Age: 56
End: 2021-11-29
Payer: COMMERCIAL

## 2021-12-02 ENCOUNTER — OFFICE VISIT (OUTPATIENT)
Dept: PHYSICAL THERAPY | Facility: HOME HEALTHCARE | Age: 56
End: 2021-12-02
Payer: COMMERCIAL

## 2021-12-02 DIAGNOSIS — M54.31 RIGHT SIDED SCIATICA: Primary | ICD-10-CM

## 2021-12-02 PROCEDURE — 97112 NEUROMUSCULAR REEDUCATION: CPT

## 2021-12-02 PROCEDURE — 97110 THERAPEUTIC EXERCISES: CPT

## 2021-12-06 ENCOUNTER — OFFICE VISIT (OUTPATIENT)
Dept: PHYSICAL THERAPY | Facility: HOME HEALTHCARE | Age: 56
End: 2021-12-06
Payer: COMMERCIAL

## 2021-12-06 DIAGNOSIS — M54.31 RIGHT SIDED SCIATICA: Primary | ICD-10-CM

## 2021-12-06 PROCEDURE — 97112 NEUROMUSCULAR REEDUCATION: CPT

## 2021-12-06 PROCEDURE — 97110 THERAPEUTIC EXERCISES: CPT

## 2021-12-07 DIAGNOSIS — I10 ESSENTIAL HYPERTENSION: ICD-10-CM

## 2021-12-07 RX ORDER — VALSARTAN 160 MG/1
160 TABLET ORAL DAILY
Qty: 90 TABLET | Refills: 3 | Status: SHIPPED | OUTPATIENT
Start: 2021-12-07

## 2021-12-09 ENCOUNTER — OFFICE VISIT (OUTPATIENT)
Dept: PHYSICAL THERAPY | Facility: HOME HEALTHCARE | Age: 56
End: 2021-12-09
Payer: COMMERCIAL

## 2021-12-09 DIAGNOSIS — M54.31 RIGHT SIDED SCIATICA: Primary | ICD-10-CM

## 2021-12-09 PROCEDURE — 97112 NEUROMUSCULAR REEDUCATION: CPT

## 2021-12-09 PROCEDURE — 97110 THERAPEUTIC EXERCISES: CPT

## 2021-12-13 ENCOUNTER — OFFICE VISIT (OUTPATIENT)
Dept: PHYSICAL THERAPY | Facility: HOME HEALTHCARE | Age: 56
End: 2021-12-13
Payer: COMMERCIAL

## 2021-12-13 DIAGNOSIS — M54.31 RIGHT SIDED SCIATICA: Primary | ICD-10-CM

## 2021-12-13 PROCEDURE — 97112 NEUROMUSCULAR REEDUCATION: CPT

## 2021-12-13 PROCEDURE — 97110 THERAPEUTIC EXERCISES: CPT

## 2021-12-16 ENCOUNTER — TELEPHONE (OUTPATIENT)
Dept: INTERNAL MEDICINE CLINIC | Facility: CLINIC | Age: 56
End: 2021-12-16

## 2021-12-16 ENCOUNTER — APPOINTMENT (OUTPATIENT)
Dept: PHYSICAL THERAPY | Facility: HOME HEALTHCARE | Age: 56
End: 2021-12-16
Payer: COMMERCIAL

## 2021-12-20 ENCOUNTER — APPOINTMENT (OUTPATIENT)
Dept: PHYSICAL THERAPY | Facility: HOME HEALTHCARE | Age: 56
End: 2021-12-20
Payer: COMMERCIAL

## 2021-12-23 ENCOUNTER — EVALUATION (OUTPATIENT)
Dept: PHYSICAL THERAPY | Facility: HOME HEALTHCARE | Age: 56
End: 2021-12-23
Payer: COMMERCIAL

## 2021-12-23 DIAGNOSIS — M54.31 RIGHT SIDED SCIATICA: Primary | ICD-10-CM

## 2021-12-23 PROCEDURE — 97110 THERAPEUTIC EXERCISES: CPT

## 2021-12-23 PROCEDURE — 97140 MANUAL THERAPY 1/> REGIONS: CPT

## 2022-01-25 ENCOUNTER — HOSPITAL ENCOUNTER (OUTPATIENT)
Dept: RADIOLOGY | Facility: HOSPITAL | Age: 57
Discharge: HOME | End: 2022-01-25
Payer: COMMERCIAL

## 2022-01-25 ENCOUNTER — TELEMEDICINE (OUTPATIENT)
Dept: INTERNAL MEDICINE CLINIC | Facility: CLINIC | Age: 57
End: 2022-01-25
Payer: COMMERCIAL

## 2022-01-25 DIAGNOSIS — U07.1 COVID-19: ICD-10-CM

## 2022-01-25 DIAGNOSIS — Z20.822 EXPOSURE TO COVID-19 VIRUS: ICD-10-CM

## 2022-01-25 DIAGNOSIS — U07.1 COVID-19: Primary | ICD-10-CM

## 2022-01-25 LAB
SARS-COV-2 AG UPPER RESP QL IA: POSITIVE
VALID CONTROL: ABNORMAL

## 2022-01-25 PROCEDURE — 71046 X-RAY EXAM CHEST 2 VIEWS: CPT

## 2022-01-25 PROCEDURE — 99213 OFFICE O/P EST LOW 20 MIN: CPT | Performed by: FAMILY MEDICINE

## 2022-01-25 PROCEDURE — 87811 SARS-COV-2 COVID19 W/OPTIC: CPT | Performed by: FAMILY MEDICINE

## 2022-01-25 RX ORDER — PREDNISONE 10 MG/1
40 TABLET ORAL DAILY
Qty: 20 TABLET | Refills: 0 | Status: SHIPPED | OUTPATIENT
Start: 2022-01-25 | End: 2022-01-30

## 2022-01-25 RX ORDER — DOXYCYCLINE 100 MG/1
100 CAPSULE ORAL 2 TIMES DAILY
Qty: 14 CAPSULE | Refills: 0 | Status: SHIPPED | OUTPATIENT
Start: 2022-01-25 | End: 2022-02-01

## 2022-01-25 NOTE — PROGRESS NOTES
COVID-19 Outpatient Progress Note    Assessment/Plan:    Problem List Items Addressed This Visit     None      Visit Diagnoses     COVID-19    -  Primary    Relevant Medications    doxycycline monohydrate (MONODOX) 100 mg capsule    predniSONE 10 mg tablet    Other Relevant Orders    POCT Rapid Covid Ag (Completed)    XR chest pa & lateral (Completed)    Exposure to COVID-19 virus             Disposition:     Rapid antigen testing was performed and the result is POSITIVE for COVID-19  Patient has COVID-19 infection  Based off CDC guidelines, they were recommended to isolate for 5 days from the date of the positive test  If they remain asymptomatic, isolation may be ended followed by 5 days of wearing a mask when around othes to minimize risk of infecting others  If they have a fever, continue to stay home until fever resolves for at least 24 hours  Fluids  Rest   Tylenol or Motrin if needed  Stay adequately hydrated  Deep breathing exercises recommended  Frequent position changes recommended  Prone positioning recommended  Coronavirus testing completed  Rapid testing was positive  Warning signs and symptoms discussed  Chest significantly congested with some wheezing  Will check a chest x-ray  Doxycycline and prednisone as noted above  Advised her to call later this week to give our office an update  I have spent 15 minutes directly with the patient  Greater than 50% of this time was spent in counseling/coordination of care regarding: diagnostic results, instructions for management, patient and family education, importance of treatment compliance, risk factor reductions and impressions        Encounter provider Fe Estrada MD    Provider located at [address] [id number]    Recent Visits  Date Type Provider Dept   01/25/22 Alexandrea Barrios MD  [facility] [place]   Showing recent visits within past 7 days and meeting all other requirements  Future Appointments  No visits were found meeting these conditions  Showing future appointments within next 150 days and meeting all other requirements     Subjective:   Jovon Hutton is a 55 y o  female who is concerned about COVID-19  Patient's symptoms include fever, chills, fatigue, malaise, nasal congestion, rhinorrhea, sore throat, cough, shortness of breath, chest tightness, myalgias and headache  Patient denies anosmia, loss of taste, abdominal pain, nausea, vomiting and diarrhea  - Date of symptom onset: 1/24/2022      COVID-19 vaccination status: Fully vaccinated (primary series)    Exposure:   Contact with a person who is under investigation (PUI) for or who is positive for COVID-19 within the last 14 days?: No    Hospitalized recently for fever and/or lower respiratory symptoms?: No      Currently a healthcare worker that is involved in direct patient care?: No      Works in a special setting where the risk of COVID-19 transmission may be high? (this may include long-term care, correctional and jail facilities; homeless shelters; assisted-living facilities and group homes ): No      Resident in a special setting where the risk of COVID-19 transmission may be high? (this may include long-term care, correctional and jail facilities; homeless shelters; assisted-living facilities and group homes ): No      Started with symptoms over the past 24-48 hours  Some people have been sick at work but no definite exposures to coronavirus        Lab Results   Component Value Date    SARSCOV2 Negative 02/17/2021    SARSCOV2 Not Detected 12/21/2020    SARSCOVAG Positive (A) 01/25/2022     Past Medical History:   Diagnosis Date    Allergic     Anxiety      Past Surgical History:   Procedure Laterality Date    NO PAST SURGERIES       Current Outpatient Medications   Medication Sig Dispense Refill    cyclobenzaprine (FLEXERIL) 10 mg tablet Take 1 tablet (10 mg total) by mouth daily at bedtime As needed 30 tablet 0    doxycycline monohydrate (MONODOX) 100 mg capsule Take 1 capsule (100 mg total) by mouth 2 (two) times a day for 7 days 14 capsule 0    fluticasone (FLONASE) 50 mcg/act nasal spray 1 spray into each nostril daily 16 g 5    meloxicam (MOBIC) 15 mg tablet Take 1 tablet (15 mg total) by mouth daily 30 tablet 5    predniSONE 10 mg tablet Take 4 tablets (40 mg total) by mouth daily for 5 days 20 tablet 0    valsartan (DIOVAN) 160 mg tablet Take 1 tablet (160 mg total) by mouth daily 90 tablet 3     No current facility-administered medications for this visit  Allergies   Allergen Reactions    Measles Mumps And Rubella Virus Vaccine Live Arthralgia     Knee swelling       Review of Systems   Constitutional: Positive for chills, fatigue and fever  HENT: Positive for congestion, rhinorrhea and sore throat  Respiratory: Positive for cough, chest tightness and shortness of breath  Gastrointestinal: Negative for abdominal pain, diarrhea, nausea and vomiting  Musculoskeletal: Positive for myalgias  Neurological: Positive for headaches  Objective:    Vitals:    01/25/22 1114   Pulse: 94   Temp: 100 2 °F (37 9 °C)   SpO2: 97%       Physical Exam  Vitals reviewed  Constitutional:       General: She is not in acute distress  Appearance: She is well-developed and well-groomed  She is ill-appearing  HENT:      Head:      Comments: Moist mucous membranes; boggy nasal mucosa with clear nasal discharge  Cardiovascular:      Rate and Rhythm: Normal rate and regular rhythm  Heart sounds: No murmur heard  Pulmonary:      Breath sounds: Wheezing and rhonchi present  No decreased breath sounds  Musculoskeletal:      Cervical back: Neck supple  Lymphadenopathy:      Cervical: No cervical adenopathy  Neurological:      Mental Status: She is alert  Psychiatric:         Behavior: Behavior is cooperative           VIRTUAL VISIT DISCLAIMER    Nghia Bundy verbally agrees to participate in [employer]  Pt is aware that [employer] could be limited without vital signs or the ability to perform a full hands-on physical exam  Kaylee Ladd understands she or the provider may request at any time to terminate the video visit and request the patient to seek care or treatment in person

## 2022-01-26 VITALS — HEART RATE: 94 BPM | OXYGEN SATURATION: 97 % | TEMPERATURE: 100.2 F

## 2022-02-02 ENCOUNTER — TELEPHONE (OUTPATIENT)
Dept: INTERNAL MEDICINE CLINIC | Facility: CLINIC | Age: 57
End: 2022-02-02

## 2022-02-02 NOTE — TELEPHONE ENCOUNTER
Patient called stating that she went back to work today but is still coughing and has a headache so she went home  Patient stated that she will return to work Monday 2/7/22 and will need a note

## 2022-02-03 NOTE — TELEPHONE ENCOUNTER
I called and spoke to patient informing her that she must call our office on Monday and let us know how she is feeling

## 2022-03-22 ENCOUNTER — OFFICE VISIT (OUTPATIENT)
Dept: INTERNAL MEDICINE CLINIC | Facility: CLINIC | Age: 57
End: 2022-03-22
Payer: COMMERCIAL

## 2022-03-22 VITALS
HEIGHT: 65 IN | BODY MASS INDEX: 34.66 KG/M2 | TEMPERATURE: 97.2 F | SYSTOLIC BLOOD PRESSURE: 168 MMHG | HEART RATE: 114 BPM | WEIGHT: 208 LBS | OXYGEN SATURATION: 97 % | DIASTOLIC BLOOD PRESSURE: 94 MMHG

## 2022-03-22 DIAGNOSIS — F41.9 ANXIETY: ICD-10-CM

## 2022-03-22 DIAGNOSIS — Z13.1 SCREENING FOR DIABETES MELLITUS: ICD-10-CM

## 2022-03-22 DIAGNOSIS — I10 ESSENTIAL HYPERTENSION: Primary | ICD-10-CM

## 2022-03-22 DIAGNOSIS — E78.2 MIXED HYPERLIPIDEMIA: ICD-10-CM

## 2022-03-22 DIAGNOSIS — E66.09 CLASS 1 OBESITY DUE TO EXCESS CALORIES WITH SERIOUS COMORBIDITY AND BODY MASS INDEX (BMI) OF 34.0 TO 34.9 IN ADULT: ICD-10-CM

## 2022-03-22 DIAGNOSIS — M54.31 RIGHT SIDED SCIATICA: ICD-10-CM

## 2022-03-22 DIAGNOSIS — J30.9 ALLERGIC RHINITIS, UNSPECIFIED SEASONALITY, UNSPECIFIED TRIGGER: ICD-10-CM

## 2022-03-22 DIAGNOSIS — Z12.31 VISIT FOR SCREENING MAMMOGRAM: ICD-10-CM

## 2022-03-22 PROBLEM — E66.811 CLASS 1 OBESITY WITH SERIOUS COMORBIDITY AND BODY MASS INDEX (BMI) OF 34.0 TO 34.9 IN ADULT: Status: ACTIVE | Noted: 2022-03-22

## 2022-03-22 PROBLEM — E66.9 CLASS 1 OBESITY WITH SERIOUS COMORBIDITY AND BODY MASS INDEX (BMI) OF 34.0 TO 34.9 IN ADULT: Status: ACTIVE | Noted: 2022-03-22

## 2022-03-22 PROCEDURE — 99214 OFFICE O/P EST MOD 30 MIN: CPT | Performed by: FAMILY MEDICINE

## 2022-03-22 RX ORDER — LORAZEPAM 0.5 MG/1
0.5 TABLET ORAL EVERY 8 HOURS PRN
Qty: 60 TABLET | Refills: 1 | Status: SHIPPED | OUTPATIENT
Start: 2022-03-22

## 2022-03-22 NOTE — PROGRESS NOTES
Assessment/Plan:    No problem-specific Assessment & Plan notes found for this encounter  Diagnoses and all orders for this visit:    Essential hypertension  -     CBC and differential; Future  -     Comprehensive metabolic panel; Future    Right sided sciatica  -     CBC and differential; Future    Mixed hyperlipidemia  -     Comprehensive metabolic panel; Future  -     Lipid panel; Future  -     TSH, 3rd generation; Future    Class 1 obesity due to excess calories with serious comorbidity and body mass index (BMI) of 34 0 to 34 9 in adult    Anxiety  -     LORazepam (ATIVAN) 0 5 mg tablet; Take 1 tablet (0 5 mg total) by mouth every 8 (eight) hours as needed for anxiety    Allergic rhinitis, unspecified seasonality, unspecified trigger    Screening for diabetes mellitus  -     Comprehensive metabolic panel; Future  -     Hemoglobin A1C; Future    Visit for screening mammogram  -     Mammo screening bilateral w 3d & cad; Future      orders recommendations as noted above  Slip given for her routine laboratory testing  Blood pressure significantly above goal but admits she has not been taking her valsartan regularly  Forgets it in the evening  Advised her than to take it in the morning since she will then take it consistently  Watch salt intake  Follow blood pressure at home  Watch diet  Cholesterol has been mildly elevated in the past   Increase fiber in diet  Discussed weight with her  She is very concerned about this  Dietary changes discussed  Increase activity level  Decrease portion size  She is asking about medication options  She will likely not qualify for most of the options  Would recommend following up with medical weight management  Anxiety symptoms discussed  Hopefully this will lessen with the change in her drop  Prescription given for lorazepam to take on an as-needed basis for the short-term  Potential risks discussed  PDMP reviewed  Allergy symptoms discussed    Continue with the Flonase  Discussed use of over-the-counter antihistamines  Will have her follow up with our office in about 6 months or sooner if needed  Subjective:      Patient ID: Los Hobson is a 55 y o  female  She presents for routine follow-up  Has generally been doing about the same  Still has some back issues especially into low back and radiating into the right leg at times  Not as bad as it had been previously  Tries to remain as active as possible  Feels that she cannot exercise regularly because of her back symptoms  Is frustrated by her weight and is asking about over-the-counter options are medication options to help with this  Anxiety symptoms have worsened with recent stressors at work  She will be changing jobs in the near future and she is hopeful that this will help  Feels increased anxiety most days  Has difficulty sleeping at times  Appetite has been generally stable  Denies any nausea, vomiting, or diarrhea  Denies any changes in bowel movements  Denies any chest pain or palpitations  Denies any significant shortness of breath  Admits that she has not been taking her valsartan every day  She typically takes this at night and often forgets it  Denies any headaches or localized weakness  The following portions of the patient's history were reviewed and updated as appropriate:   She  has a past medical history of Allergic and Anxiety    She   Patient Active Problem List    Diagnosis Date Noted    Class 1 obesity with serious comorbidity and body mass index (BMI) of 34 0 to 34 9 in adult 03/22/2022    Screening for diabetes mellitus 03/22/2022    Right sided sciatica 07/22/2021    Low TSH level 07/30/2019    Reactive depression 07/09/2019    Mixed hyperlipidemia 06/06/2018    Essential hypertension 06/06/2018    Screening for colon cancer 06/06/2018    Anxiety 06/06/2018    Allergic rhinitis 06/06/2018     She  has a past surgical history that includes No past surgeries  Her family history includes Heart disease in her mother; Other in her father  She  reports that she has never smoked  She has never used smokeless tobacco  She reports that she does not drink alcohol and does not use drugs  Current Outpatient Medications   Medication Sig Dispense Refill    fluticasone (FLONASE) 50 mcg/act nasal spray 1 spray into each nostril daily 16 g 5    valsartan (DIOVAN) 160 mg tablet Take 1 tablet (160 mg total) by mouth daily 90 tablet 3    LORazepam (ATIVAN) 0 5 mg tablet Take 1 tablet (0 5 mg total) by mouth every 8 (eight) hours as needed for anxiety 60 tablet 1     No current facility-administered medications for this visit  Current Outpatient Medications on File Prior to Visit   Medication Sig    fluticasone (FLONASE) 50 mcg/act nasal spray 1 spray into each nostril daily    valsartan (DIOVAN) 160 mg tablet Take 1 tablet (160 mg total) by mouth daily    [DISCONTINUED] cyclobenzaprine (FLEXERIL) 10 mg tablet Take 1 tablet (10 mg total) by mouth daily at bedtime As needed    [DISCONTINUED] meloxicam (MOBIC) 15 mg tablet Take 1 tablet (15 mg total) by mouth daily     No current facility-administered medications on file prior to visit  She is allergic to measles mumps and rubella virus vaccine live       Review of Systems   Constitutional: Positive for fatigue  Negative for activity change, appetite change, chills and fever  HENT: Negative for congestion and rhinorrhea  Eyes: Negative for visual disturbance  Respiratory: Negative for cough, chest tightness and shortness of breath  Cardiovascular: Negative for chest pain and palpitations  Gastrointestinal: Negative for abdominal pain, blood in stool, diarrhea, nausea and vomiting  Endocrine: Negative for polydipsia, polyphagia and polyuria  Genitourinary: Negative for dysuria, frequency and urgency  Musculoskeletal: Positive for back pain and myalgias  Negative for gait problem     Skin: Negative for color change  Neurological: Negative for dizziness and headaches  Hematological: Does not bruise/bleed easily  Psychiatric/Behavioral: Positive for sleep disturbance  Negative for confusion  The patient is nervous/anxious  Objective:      /94 (BP Location: Left arm, Patient Position: Sitting, Cuff Size: Large)   Pulse (!) 114   Temp (!) 97 2 °F (36 2 °C)   Ht 5' 5" (1 651 m)   Wt 94 3 kg (208 lb)   SpO2 97%   BMI 34 61 kg/m²          Physical Exam  Vitals and nursing note reviewed  Constitutional:       General: She is not in acute distress  Appearance: She is well-developed and well-groomed  HENT:      Head: Normocephalic and atraumatic  Comments: Slight cerumen  Eyes:      General:         Right eye: No discharge  Left eye: No discharge  Conjunctiva/sclera: Conjunctivae normal       Pupils: Pupils are equal, round, and reactive to light  Neck:      Vascular: No carotid bruit  Cardiovascular:      Rate and Rhythm: Normal rate and regular rhythm  Heart sounds: Normal heart sounds  No murmur heard  No friction rub  No gallop  Pulmonary:      Effort: No respiratory distress  Breath sounds: No wheezing or rales  Abdominal:      General: Bowel sounds are normal  There is no distension  Tenderness: There is no abdominal tenderness  Lymphadenopathy:      Cervical: No cervical adenopathy  Skin:     General: Skin is warm and dry  Neurological:      Mental Status: She is alert and oriented to person, place, and time  Psychiatric:         Mood and Affect: Affect normal  Mood is anxious  Speech: Speech normal          Behavior: Behavior normal  Behavior is cooperative  BMI Counseling: Body mass index is 34 61 kg/m²   The BMI is above normal  Nutrition recommendations include encouraging healthy choices of fruits and vegetables, decreasing fast food intake, consuming healthier snacks, limiting drinks that contain sugar, moderation in carbohydrate intake and reducing intake of cholesterol  Exercise recommendations include exercising 3-5 times per week  Rationale for BMI follow-up plan is due to patient being overweight or obese

## 2022-07-05 ENCOUNTER — OFFICE VISIT (OUTPATIENT)
Dept: INTERNAL MEDICINE CLINIC | Facility: CLINIC | Age: 57
End: 2022-07-05
Payer: COMMERCIAL

## 2022-07-05 VITALS — TEMPERATURE: 99.2 F | OXYGEN SATURATION: 97 % | HEART RATE: 88 BPM

## 2022-07-05 DIAGNOSIS — J01.90 ACUTE NON-RECURRENT SINUSITIS, UNSPECIFIED LOCATION: Primary | ICD-10-CM

## 2022-07-05 LAB
SARS-COV-2 AG UPPER RESP QL IA: NEGATIVE
VALID CONTROL: NORMAL

## 2022-07-05 PROCEDURE — 87811 SARS-COV-2 COVID19 W/OPTIC: CPT | Performed by: FAMILY MEDICINE

## 2022-07-05 PROCEDURE — 99213 OFFICE O/P EST LOW 20 MIN: CPT | Performed by: FAMILY MEDICINE

## 2022-07-05 RX ORDER — AMOXICILLIN 875 MG/1
875 TABLET, COATED ORAL 2 TIMES DAILY
Qty: 20 TABLET | Refills: 0 | Status: SHIPPED | OUTPATIENT
Start: 2022-07-05 | End: 2022-07-15

## 2022-07-05 NOTE — LETTER
July 5, 2022     Patient: Mary Santana  YOB: 1965  Date of Visit: 7/5/2022      To Whom it May Concern:    Mary Santana is under my professional care  Rhoda Good was seen in my office on 7/5/2022  Please excuse her from work on 7/5/22  If you have any questions or concerns, please don't hesitate to call           Sincerely,          Sapphire Tipton MD        CC: Mary Santana

## 2022-07-06 NOTE — PROGRESS NOTES
Assessment/Plan:    No problem-specific Assessment & Plan notes found for this encounter  Diagnoses and all orders for this visit:    Acute non-recurrent sinusitis, unspecified location  -     POCT Rapid Covid Ag  -     amoxicillin (AMOXIL) 875 mg tablet; Take 1 tablet (875 mg total) by mouth 2 (two) times a day for 10 days    orders recommendations as noted above  Coronavirus testing negative  Fluids  Rest   Note given for work  Coronavirus testing negative  Amoxicillin as noted above  Call or follow-up if symptoms worsen or persist       Subjective:      Patient ID: Rogelio Ferrera is a 55 y o  female  She presents for acute visit  Started over the last 3 days with nasal congestion and sore throat  Some postnasal drip  Some cough  Rarely productive of mucus  Slight headache  Has had low-grade fever on and off  Some body aches  Did rapid coronavirus testing at home which was negative  The following portions of the patient's history were reviewed and updated as appropriate:   She  has a past medical history of Allergic and Anxiety  She   Patient Active Problem List    Diagnosis Date Noted    Acute sinusitis 07/05/2022    Class 1 obesity with serious comorbidity and body mass index (BMI) of 34 0 to 34 9 in adult 03/22/2022    Screening for diabetes mellitus 03/22/2022    Right sided sciatica 07/22/2021    Low TSH level 07/30/2019    Reactive depression 07/09/2019    Mixed hyperlipidemia 06/06/2018    Essential hypertension 06/06/2018    Screening for colon cancer 06/06/2018    Anxiety 06/06/2018    Allergic rhinitis 06/06/2018     She  has a past surgical history that includes No past surgeries  Her family history includes Heart disease in her mother; Other in her father  She  reports that she has never smoked  She has never used smokeless tobacco  She reports that she does not drink alcohol and does not use drugs    Current Outpatient Medications   Medication Sig Dispense Refill  amoxicillin (AMOXIL) 875 mg tablet Take 1 tablet (875 mg total) by mouth 2 (two) times a day for 10 days 20 tablet 0    fluticasone (FLONASE) 50 mcg/act nasal spray 1 spray into each nostril daily 16 g 5    LORazepam (ATIVAN) 0 5 mg tablet Take 1 tablet (0 5 mg total) by mouth every 8 (eight) hours as needed for anxiety 60 tablet 1    valsartan (DIOVAN) 160 mg tablet Take 1 tablet (160 mg total) by mouth daily 90 tablet 3     No current facility-administered medications for this visit  Current Outpatient Medications on File Prior to Visit   Medication Sig    fluticasone (FLONASE) 50 mcg/act nasal spray 1 spray into each nostril daily    LORazepam (ATIVAN) 0 5 mg tablet Take 1 tablet (0 5 mg total) by mouth every 8 (eight) hours as needed for anxiety    valsartan (DIOVAN) 160 mg tablet Take 1 tablet (160 mg total) by mouth daily     No current facility-administered medications on file prior to visit  She is allergic to measles mumps and rubella virus vaccine live       Review of Systems   Constitutional: Positive for chills, fatigue and fever  Negative for unexpected weight change  HENT: Positive for congestion, postnasal drip, sinus pressure, sinus pain and sore throat  Respiratory: Positive for cough  Cardiovascular: Negative for chest pain and palpitations  Gastrointestinal: Negative for abdominal pain, diarrhea, nausea and vomiting  Musculoskeletal: Negative for arthralgias  Skin: Negative for rash  Allergic/Immunologic: Negative for immunocompromised state  Neurological: Negative for light-headedness  Objective:      Pulse 88   Temp 99 2 °F (37 3 °C)   SpO2 97%          Physical Exam  Vitals reviewed  Constitutional:       General: She is not in acute distress  Appearance: She is well-developed and well-groomed     HENT:      Head:      Comments: Moist mucous membranes; slight posterior pharyngeal erythema; boggy nasal mucosa  Cardiovascular:      Rate and Rhythm: Normal rate and regular rhythm  Pulmonary:      Breath sounds: Transmitted upper airway sounds present  No decreased breath sounds or wheezing  Musculoskeletal:      Cervical back: Neck supple  Lymphadenopathy:      Cervical: No cervical adenopathy  Neurological:      Mental Status: She is alert  Psychiatric:         Behavior: Behavior is cooperative

## 2022-08-24 ENCOUNTER — APPOINTMENT (OUTPATIENT)
Dept: LAB | Facility: MEDICAL CENTER | Age: 57
End: 2022-08-24
Payer: COMMERCIAL

## 2022-08-24 DIAGNOSIS — Z00.8 HEALTH EXAMINATION IN POPULATION SURVEY: ICD-10-CM

## 2022-08-24 LAB
CHOLEST SERPL-MCNC: 214 MG/DL
EST. AVERAGE GLUCOSE BLD GHB EST-MCNC: 126 MG/DL
HBA1C MFR BLD: 6 %
HDLC SERPL-MCNC: 56 MG/DL
LDLC SERPL CALC-MCNC: 130 MG/DL (ref 0–100)
NONHDLC SERPL-MCNC: 158 MG/DL
TRIGL SERPL-MCNC: 141 MG/DL

## 2022-08-24 PROCEDURE — 83036 HEMOGLOBIN GLYCOSYLATED A1C: CPT

## 2022-08-24 PROCEDURE — 80061 LIPID PANEL: CPT

## 2022-08-24 PROCEDURE — 36415 COLL VENOUS BLD VENIPUNCTURE: CPT

## 2022-09-27 ENCOUNTER — OFFICE VISIT (OUTPATIENT)
Dept: INTERNAL MEDICINE CLINIC | Facility: CLINIC | Age: 57
End: 2022-09-27
Payer: COMMERCIAL

## 2022-09-27 VITALS
OXYGEN SATURATION: 97 % | HEART RATE: 113 BPM | DIASTOLIC BLOOD PRESSURE: 82 MMHG | WEIGHT: 207.6 LBS | TEMPERATURE: 97.2 F | HEIGHT: 65 IN | BODY MASS INDEX: 34.59 KG/M2 | SYSTOLIC BLOOD PRESSURE: 136 MMHG

## 2022-09-27 DIAGNOSIS — R73.02 IMPAIRED GLUCOSE TOLERANCE: ICD-10-CM

## 2022-09-27 DIAGNOSIS — F41.9 ANXIETY: ICD-10-CM

## 2022-09-27 DIAGNOSIS — E78.2 MIXED HYPERLIPIDEMIA: ICD-10-CM

## 2022-09-27 DIAGNOSIS — E66.09 CLASS 1 OBESITY DUE TO EXCESS CALORIES WITH SERIOUS COMORBIDITY AND BODY MASS INDEX (BMI) OF 34.0 TO 34.9 IN ADULT: ICD-10-CM

## 2022-09-27 DIAGNOSIS — I10 ESSENTIAL HYPERTENSION: Primary | ICD-10-CM

## 2022-09-27 DIAGNOSIS — J30.9 ALLERGIC RHINITIS, UNSPECIFIED SEASONALITY, UNSPECIFIED TRIGGER: ICD-10-CM

## 2022-09-27 PROBLEM — J01.90 ACUTE SINUSITIS: Status: RESOLVED | Noted: 2022-07-05 | Resolved: 2022-09-27

## 2022-09-27 PROCEDURE — 99214 OFFICE O/P EST MOD 30 MIN: CPT | Performed by: FAMILY MEDICINE

## 2022-09-27 RX ORDER — FLUTICASONE PROPIONATE 50 MCG
1 SPRAY, SUSPENSION (ML) NASAL DAILY
Qty: 16 G | Refills: 5 | Status: SHIPPED | OUTPATIENT
Start: 2022-09-27

## 2022-09-27 NOTE — PROGRESS NOTES
Assessment/Plan:    No problem-specific Assessment & Plan notes found for this encounter  Diagnoses and all orders for this visit:    Essential hypertension  -     CBC and differential; Future  -     Comprehensive metabolic panel; Future    Allergic rhinitis, unspecified seasonality, unspecified trigger  -     fluticasone (FLONASE) 50 mcg/act nasal spray; 1 spray into each nostril daily  -     CBC and differential; Future    Anxiety  -     CBC and differential; Future    Class 1 obesity due to excess calories with serious comorbidity and body mass index (BMI) of 34 0 to 34 9 in adult  -     CBC and differential; Future    Mixed hyperlipidemia  -     CBC and differential; Future  -     Comprehensive metabolic panel; Future  -     Lipid panel; Future  -     TSH, 3rd generation; Future    Impaired glucose tolerance  -     CBC and differential; Future  -     Comprehensive metabolic panel; Future  -     Hemoglobin A1C; Future    orders recommendations as noted above  Previous laboratory testing reviewed with her  Cholesterol slightly elevated  Hemoglobin A1c slightly elevated  Watch diet more closely  Portion control discussed  Avoid sugary beverages such as soda  Increase activity level  Slow but steady weight loss recommended  Blood pressure relatively well controlled  Continue with the valsartan  Watch salt intake  Stay adequately hydrated  Start to use the Flonase daily  Use of over-the-counter antihistamines discussed  Discussed the possibility medications such as Ozempic or something similar in the future to help with her blood sugar as well as weight loss  She will consider these  Recommend mammograms yearly  Recommend bone densities every other year  She will get her flu shot through work  Coronavirus booster discussed  Will have her follow up in about 6 months with laboratory testing prior to that visit  Follow up sooner if needed        Subjective:      Patient ID: Cheri Pittman is a 65 y o  female  She presents for routine follow-up  Has generally been doing relatively well  Allergies have been acting up with watery eyes as well as postnasal drip and runny nose  Has worsened over the last month or so  Did run out of her Flonase  Has not been taking over-the-counter antihistamines  She is also frustrated by her weight  She admits that she does not always eat as healthy as she should and does like soda but has been trying to limit the amount  Not exercising regularly  Tolerating the valsartan without difficulty  Denies any headaches or localized weakness  Denies any chest pain or palpitations  Some anxiety symptoms at times especially related to work  Usually sleeps relatively well  Appetite has been stable  Denies any nausea, vomiting, or diarrhea  The following portions of the patient's history were reviewed and updated as appropriate:   She  has a past medical history of Allergic and Anxiety  She   Patient Active Problem List    Diagnosis Date Noted    Impaired glucose tolerance 09/27/2022    Class 1 obesity with serious comorbidity and body mass index (BMI) of 34 0 to 34 9 in adult 03/22/2022    Screening for diabetes mellitus 03/22/2022    Right sided sciatica 07/22/2021    Low TSH level 07/30/2019    Reactive depression 07/09/2019    Mixed hyperlipidemia 06/06/2018    Essential hypertension 06/06/2018    Screening for colon cancer 06/06/2018    Anxiety 06/06/2018    Allergic rhinitis 06/06/2018     She  has a past surgical history that includes No past surgeries  Her family history includes Heart disease in her mother; Other in her father  She  reports that she has never smoked  She has never used smokeless tobacco  She reports that she does not drink alcohol and does not use drugs    Current Outpatient Medications   Medication Sig Dispense Refill    fluticasone (FLONASE) 50 mcg/act nasal spray 1 spray into each nostril daily 16 g 5    valsartan (DIOVAN) 160 mg tablet Take 1 tablet (160 mg total) by mouth daily 90 tablet 3    LORazepam (ATIVAN) 0 5 mg tablet Take 1 tablet (0 5 mg total) by mouth every 8 (eight) hours as needed for anxiety (Patient not taking: Reported on 9/27/2022) 60 tablet 1     No current facility-administered medications for this visit  Current Outpatient Medications on File Prior to Visit   Medication Sig    valsartan (DIOVAN) 160 mg tablet Take 1 tablet (160 mg total) by mouth daily    [DISCONTINUED] fluticasone (FLONASE) 50 mcg/act nasal spray 1 spray into each nostril daily    LORazepam (ATIVAN) 0 5 mg tablet Take 1 tablet (0 5 mg total) by mouth every 8 (eight) hours as needed for anxiety (Patient not taking: Reported on 9/27/2022)     No current facility-administered medications on file prior to visit  She is allergic to measles mumps and rubella virus vaccine live       Review of Systems   Constitutional: Negative for activity change, appetite change, chills and fever  HENT: Negative for congestion and rhinorrhea  Eyes: Negative for visual disturbance  Respiratory: Negative for chest tightness and shortness of breath  Cardiovascular: Negative for chest pain and palpitations  Gastrointestinal: Negative for abdominal pain, blood in stool, diarrhea, nausea and vomiting  Endocrine: Negative for polydipsia, polyphagia and polyuria  Genitourinary: Negative for dysuria, frequency and urgency  Musculoskeletal: Negative for gait problem  Skin: Negative for color change  Neurological: Negative for dizziness and headaches  Hematological: Does not bruise/bleed easily  Psychiatric/Behavioral: Negative for confusion and sleep disturbance  The patient is not nervous/anxious            Objective:      /82 (BP Location: Left arm, Patient Position: Sitting, Cuff Size: Large)   Pulse (!) 113   Temp (!) 97 2 °F (36 2 °C)   Ht 5' 5" (1 651 m)   Wt 94 2 kg (207 lb 9 6 oz)   SpO2 97%   BMI 34 55 kg/m² Physical Exam  Vitals and nursing note reviewed  Constitutional:       General: She is not in acute distress  Appearance: She is well-developed, well-groomed and overweight  HENT:      Head: Normocephalic and atraumatic  Comments: Boggy niece because of with clear nasal discharge; cobblestoning posterior pharynx  Eyes:      General:         Right eye: No discharge  Left eye: No discharge  Conjunctiva/sclera: Conjunctivae normal       Pupils: Pupils are equal, round, and reactive to light  Neck:      Thyroid: No thyromegaly  Cardiovascular:      Rate and Rhythm: Normal rate and regular rhythm  Heart sounds: Normal heart sounds  No murmur heard  No friction rub  No gallop  Pulmonary:      Effort: No respiratory distress  Breath sounds: No wheezing or rales  Abdominal:      General: Bowel sounds are normal  There is no distension  Tenderness: There is no abdominal tenderness  Musculoskeletal:      Cervical back: Neck supple  Lymphadenopathy:      Cervical: No cervical adenopathy  Skin:     General: Skin is warm and dry  Neurological:      Mental Status: She is alert and oriented to person, place, and time  Psychiatric:         Mood and Affect: Mood and affect normal          Speech: Speech normal          Behavior: Behavior normal  Behavior is cooperative           Cognition and Memory: Cognition and memory normal

## 2022-10-11 PROBLEM — Z13.1 SCREENING FOR DIABETES MELLITUS: Status: RESOLVED | Noted: 2022-03-22 | Resolved: 2022-10-11

## 2022-11-04 ENCOUNTER — TELEPHONE (OUTPATIENT)
Dept: INTERNAL MEDICINE CLINIC | Facility: CLINIC | Age: 57
End: 2022-11-04

## 2022-11-04 NOTE — TELEPHONE ENCOUNTER
Patient called stating that she is interested in taking the medication for weight loss  I told her that Frank Dress is on back order for all pharmacies and she stated that there are other medications

## 2022-12-21 DIAGNOSIS — E66.09 CLASS 1 OBESITY DUE TO EXCESS CALORIES WITH SERIOUS COMORBIDITY AND BODY MASS INDEX (BMI) OF 34.0 TO 34.9 IN ADULT: Primary | ICD-10-CM

## 2023-01-03 ENCOUNTER — TELEPHONE (OUTPATIENT)
Dept: BARIATRICS | Facility: CLINIC | Age: 58
End: 2023-01-03

## 2023-01-03 NOTE — TELEPHONE ENCOUNTER
Left voicemail to call office and schedule consult visit with [place] location   Patient has a referral

## 2023-01-24 DIAGNOSIS — I10 ESSENTIAL HYPERTENSION: ICD-10-CM

## 2023-01-24 RX ORDER — VALSARTAN 160 MG/1
160 TABLET ORAL DAILY
Qty: 90 TABLET | Refills: 3 | Status: SHIPPED | OUTPATIENT
Start: 2023-01-24

## 2023-03-13 ENCOUNTER — OFFICE VISIT (OUTPATIENT)
Dept: BARIATRICS | Facility: CLINIC | Age: 58
End: 2023-03-13

## 2023-03-13 ENCOUNTER — CONSULT (OUTPATIENT)
Dept: BARIATRICS | Facility: CLINIC | Age: 58
End: 2023-03-13

## 2023-03-13 VITALS
TEMPERATURE: 97.2 F | DIASTOLIC BLOOD PRESSURE: 80 MMHG | BODY MASS INDEX: 34.82 KG/M2 | HEIGHT: 65 IN | OXYGEN SATURATION: 97 % | HEART RATE: 98 BPM | SYSTOLIC BLOOD PRESSURE: 132 MMHG | WEIGHT: 209 LBS

## 2023-03-13 DIAGNOSIS — E66.09 CLASS 1 OBESITY DUE TO EXCESS CALORIES WITH BODY MASS INDEX (BMI) OF 34.0 TO 34.9 IN ADULT, UNSPECIFIED WHETHER SERIOUS COMORBIDITY PRESENT: ICD-10-CM

## 2023-03-13 DIAGNOSIS — E66.9 OBESITY, CLASS I, BMI 30-34.9: ICD-10-CM

## 2023-03-13 NOTE — PROGRESS NOTES
Assessment/Plan:  Cee Fuentes was seen today for consult  Diagnoses and all orders for this visit:    Obesity, Class I, BMI 30-34 9  -     Ambulatory Referral to Weight Management    Patient is going to pursue conservative program along with dietitian visit  Patient will meet with the dietitian today  Current calorie consumption appears to be rather high in carbs and lower in protein  Patient was encouraged to completely eliminate soda from her diet  64 ounces of water a day advised  Exercising at least 10 minutes daily with simple walking advised as well  Blood pressure should improve with weight loss and patient may be able to potentially stop taking valsartan  High blood sugar should improve with weight loss as well  Other discussion as below  - Discussed options of HealthyCORE-Intensive Lifestyle Intervention Program, Very Low Calorie Diet-VLCD and Conservative Program and the role of weight loss medications  - Explained the importance of making lifestyle changes first before starting any anti-obesity medications  Patient should demonstrate lifestyle changes first before anti-obesity medication can be initiated  - Patient is interested in pursuing Conservative Program  - Initial weight loss goal of 5-10% weight loss for improved health  - Weight loss can improve patient's co-morbid conditions and/or prevent weight-related complications  Goals:  Do not skip any meals! Food log (ie ) www myfitnesspal com,sparkpeople  com,loseit com,calorieking  com,etc  baritastic (use skinnytaste  com, dietdoctor  com or smartphone delfina MedTel.com for recipes)  No sugary beverages  At least 64oz of water daily  Increase physical activity by 10 minutes daily   Gradually increase physical activity to a goal of 5 days per week for 30 minutes of MODERATE intensity PLUS 2 days per week of FULL BODY resistance training (use smartphone apps Dealer.com, Home Workout, etc )      Total time spent: 30 min, with >50% face-to-face time spent counseling patient on nonsurgical interventions for the treatment of excess weight  Discussed in detail nonsurgical options including intensive lifestyle intervention program, very low-calorie diet program and conservative program   Discussed the role of weight loss medications  Counseled patient on diet behavior and exercise modification for weight loss  Follow up in approximately 3 months with Non-Surgical Physician/Advanced Practitioner  Subjective:   Chief Complaint   Patient presents with   • Consult     S/B 2/8  Patient ID: Milagros Ray  is a 65 y o  female with excess weight/obesity here to pursue weight management  Previous notes and records have been reviewed  Past Medical History:   Diagnosis Date   • Allergic    • Anxiety      Past Surgical History:   Procedure Laterality Date   • NO PAST SURGERIES         HPI:  Patient presents for medical weight management consultation  Admits to not eating the healthiest of diets  Admits that activity level has declined  Did have a life event a few years ago that caused some depression  Patient states that she is a stress eater  Would like help with weight loss  Has hypertension and would like to possibly come off of her medication  More recently was told her blood sugars are in the prediabetic range      Wt Readings from Last 20 Encounters:   03/13/23 94 8 kg (209 lb)   09/27/22 94 2 kg (207 lb 9 6 oz)   03/22/22 94 3 kg (208 lb)   09/20/21 95 9 kg (211 lb 8 oz)   07/22/21 94 4 kg (208 lb 1 6 oz)   12/16/19 88 kg (194 lb)   08/29/19 88 kg (194 lb)   07/30/19 88 kg (194 lb)   07/09/19 87 5 kg (193 lb)   04/03/19 87 5 kg (193 lb)   01/09/19 86 2 kg (190 lb)   06/06/18 88 7 kg (195 lb 9 6 oz)   01/06/16 79 4 kg (175 lb)   12/01/15 79 4 kg (175 lb)   08/25/15 79 4 kg (175 lb)   06/24/15 79 4 kg (175 lb)     Obesity/Excess Weight:  Severity: Mild  Onset:  Over the years    Modifiers: Diet and Exercise  Contributing factors: Poor Food Choices, Insufficient Physical Activity, Stress/Emotional Eating and Depression  Associated symptoms: comorbid conditions    B- skips 1-2x/wk or bagel w/ butter/cream cheese or egg whites w/ tomato and spinach or cookie  S- pretzels  L- leftovers  S- pretzels or S[place]ish fish  D- pasta or lentil soup or chicken or meatballs (veggies w/ all meals)  S- no    Hydration: 36oz water daily, 16oz milk daily, 4 cans reg soda on the weekend (down from 2 cans reg soda daily)  Alcohol: no  Smoking: no   Exercise: no  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 [employer]  Sleep: 8hrs  STOP ban/8    The following portions of the patient's history were reviewed and updated as appropriate: allergies, current medications, past family history, past medical history, past social history, past surgical history, and problem list     Review of Systems   Constitutional: Negative for fever  Respiratory: Negative for shortness of breath  Cardiovascular: Negative for chest pain  Gastrointestinal: Negative for abdominal pain and blood in stool  Genitourinary: Negative for dysuria and hematuria  Musculoskeletal: Positive for arthralgias and myalgias  Skin: Negative for rash  Neurological: Negative for headaches  Psychiatric/Behavioral: Negative for dysphoric mood  The patient is not nervous/anxious  Objective:  /80 (BP Location: Left arm, Cuff Size: Large)   Pulse 98   Temp (!) 97 2 °F (36 2 °C) (Temporal)   Ht 5' 5" (1 651 m)   Wt 94 8 kg (209 lb)   SpO2 97%   BMI 34 78 kg/m²   Constitutional: Well-developed, well-nourished and Obese Body mass index is 34 78 kg/m²  Gwenette Carrier HEENT: No conjunctival injection  Pulmonary: No increased work of breathing or signs of respiratory distress  CV: Well-perfused  GI: Obese  Non-distended  MSK: No edema   Neuro: Oriented to person, place and time  Normal Speech  Normal gait  Psych: Normal affect and mood       Labs and Imaging  Recent labs and imaging have been personally reviewed    Lab Results   Component Value Date    WBC 6 90 07/24/2019    HGB 15 2 07/24/2019    HCT 46 6 (H) 07/24/2019    MCV 89 07/24/2019     (H) 07/24/2019     Lab Results   Component Value Date    SODIUM 140 07/24/2019    K 3 6 07/24/2019     07/24/2019    CO2 26 07/24/2019    AGAP 6 07/24/2019    BUN 11 07/24/2019    CREATININE 0 96 07/24/2019    GLUF 100 (H) 07/24/2019    CALCIUM 8 9 07/24/2019    AST 13 07/24/2019    ALT 20 07/24/2019    ALKPHOS 112 07/24/2019    TP 7 8 07/24/2019    TBILI 1 00 07/24/2019    EGFR 68 07/24/2019     Lab Results   Component Value Date    HGBA1C 6 0 (H) 08/24/2022     Lab Results   Component Value Date    FGN9CCFWAMUO 0 349 (L) 07/24/2019     Lab Results   Component Value Date    CHOLESTEROL 214 (H) 08/24/2022     Lab Results   Component Value Date    HDL 56 08/24/2022     Lab Results   Component Value Date    TRIG 141 08/24/2022     Lab Results   Component Value Date    LDLCALC 130 (H) 08/24/2022

## 2023-03-13 NOTE — PROGRESS NOTES
Weight Management Medical Nutrition Assessment  Rodney Dorsey presented today for meal planning session  She is pursuing conservative program  Today she weighed 209# at appointment with bariatrician prior to session with RD  She would like to improve her BP, cholesterol, and BG levels  She endorsed trying to make meals on Sunday for the week  She noted her  is into fitness and is trying to motivate her to join him, but she admitted to not being very active  Provided Rodney Dorsey with and reviewed with her calorie-controlled, balanced meal plan  No follow-up scheduled at this time  Explained to patient employee weight management benefits      Patient seen by Medical Provider in past 6 months:  yes  Requested to schedule appointment with Medical Provider: No      Anthropometric Measurements  Start Weight (#): 209# today  Current Weight (#): as above  TBW % Change from start weight: n/a  Ideal Body Weight (#): 125# (56 8 kg)  Goal Weight (#): none at this time  Highest: present   Lowest: 132#     Weight Loss History  Previous weight loss attempts: none other than walking (used to walk 5 miles per day)    Food and Nutrition Related History  Wake up: 5:15 AM   Bed Time: 9 PM    Food Recall  Breakfast: 6:30- bagel with butter or cream cheese or brownie or egg white cups with tomato in spinach or granola bar    Snack: 9:30 AM- pretzels   Lunch: Noon-loki- leftovers (pasta) or salad   Snack: usually not   Dinner: 6:30- Chicken (may have cream soup in recipes); pasta/meatballs, chili (tries to use ground turkey), rarely red meat; always has vegetable; dessert on weekend only except if there's been a special occation with leftover such as cake  Snack: no      Beverages: water- 36 oz, 8 oz 1% milk or 8 oz OJ; 1% milk before bed; no tea or coffee; regular soda- 1-2 can on  weekend (used ot have more often, but cut down)  Volume of beverage intake: >=52 oz    Weekends: worse- snacks more  Cravings: chocolate  Trouble area of day: 9:30 AM (snack foods available at work)    Frequency of Eating out: rarely  Food restrictions: none   Cooking: self   Food Shopping: self    Physical Activity Intake  Activity: none  Frequency:n/a  Physical limitations/barriers to exercise: none    Estimated Needs  Energy  SECA: BMR: n/a      X 1 3 -1000 =  Bear Dal[employer] Energy Needs: BMR : 5981  1-2# loss weekly sedentary:  847-1347             1-2# loss weekly lightly active: 9419-0148  Maintenance calories for sedentary activity level: 1847  Protein: 68-85 g    (1 2-1 5g/kg IBW)  Fluid: >= 66 oz  (35mL/kg IBW)    Nutrition Diagnosis  Yes; Overweight/obesity  related to Excess energy intake as evidenced by  BMI more than normative standard for age and sex (obesity-grade I 26-30  9)       Nutrition Intervention    Nutrition Prescription  Calories:0838-8287  Protein: 68-85  Fluid: >=66 oz    Meal Plan (Elia/Pro/Carb)  Breakfast: 300/20/15-30  Snack: 100-150/5-10/15  Lunch: 300-400/20-30/30  Snack:100-150/5-10/15  Dinner:300-400/20-30/30  Snack:100-150/5-10/15    Nutrition Education:    Healthy Core Manual- n/a   Calorie controlled menu  Lean protein food choices  Healthy snack options  Food journaling tips      Nutrition Counseling:  Strategies: meal planning, portion sizes, healthy snack choices, hydration, fiber intake, protein intake, exercise, food journal      Monitoring and Evaluation:  Evaluation criteria:  Energy Intake  Meet protein needs  Maintain adequate hydration  Monitor weekly weight  Meal planning/preparation  Food journal   Decreased portions at mealtimes and snacks  Physical activity     Barriers to learning:none  Readiness to change: Preparation:  (Getting ready to change)   Comprehension: good  Expected Compliance: good

## 2023-03-25 ENCOUNTER — APPOINTMENT (OUTPATIENT)
Dept: LAB | Facility: MEDICAL CENTER | Age: 58
End: 2023-03-25

## 2023-03-25 DIAGNOSIS — F41.9 ANXIETY: ICD-10-CM

## 2023-03-25 DIAGNOSIS — J30.9 ALLERGIC RHINITIS, UNSPECIFIED SEASONALITY, UNSPECIFIED TRIGGER: ICD-10-CM

## 2023-03-25 DIAGNOSIS — E78.2 MIXED HYPERLIPIDEMIA: ICD-10-CM

## 2023-03-25 DIAGNOSIS — Z00.8 ENCOUNTER FOR OTHER GENERAL EXAMINATION: ICD-10-CM

## 2023-03-25 DIAGNOSIS — E66.09 CLASS 1 OBESITY DUE TO EXCESS CALORIES WITH SERIOUS COMORBIDITY AND BODY MASS INDEX (BMI) OF 34.0 TO 34.9 IN ADULT: ICD-10-CM

## 2023-03-25 DIAGNOSIS — R73.02 IMPAIRED GLUCOSE TOLERANCE: ICD-10-CM

## 2023-03-25 DIAGNOSIS — I10 ESSENTIAL HYPERTENSION: ICD-10-CM

## 2023-03-25 LAB
ALBUMIN SERPL BCP-MCNC: 3.5 G/DL (ref 3.5–5)
ALP SERPL-CCNC: 105 U/L (ref 46–116)
ALT SERPL W P-5'-P-CCNC: 19 U/L (ref 12–78)
ANION GAP SERPL CALCULATED.3IONS-SCNC: 3 MMOL/L (ref 4–13)
AST SERPL W P-5'-P-CCNC: 17 U/L (ref 5–45)
BASOPHILS # BLD AUTO: 0.07 THOUSANDS/ÂΜL (ref 0–0.1)
BASOPHILS NFR BLD AUTO: 1 % (ref 0–1)
BILIRUB SERPL-MCNC: 1.13 MG/DL (ref 0.2–1)
BUN SERPL-MCNC: 16 MG/DL (ref 5–25)
CALCIUM SERPL-MCNC: 9 MG/DL (ref 8.3–10.1)
CHLORIDE SERPL-SCNC: 108 MMOL/L (ref 96–108)
CHOLEST SERPL-MCNC: 209 MG/DL
CO2 SERPL-SCNC: 25 MMOL/L (ref 21–32)
CREAT SERPL-MCNC: 0.92 MG/DL (ref 0.6–1.3)
EOSINOPHIL # BLD AUTO: 0.24 THOUSAND/ÂΜL (ref 0–0.61)
EOSINOPHIL NFR BLD AUTO: 4 % (ref 0–6)
ERYTHROCYTE [DISTWIDTH] IN BLOOD BY AUTOMATED COUNT: 13.3 % (ref 11.6–15.1)
EST. AVERAGE GLUCOSE BLD GHB EST-MCNC: 120 MG/DL
GFR SERPL CREATININE-BSD FRML MDRD: 69 ML/MIN/1.73SQ M
GLUCOSE P FAST SERPL-MCNC: 115 MG/DL (ref 65–99)
HBA1C MFR BLD: 5.8 %
HCT VFR BLD AUTO: 44.4 % (ref 34.8–46.1)
HDLC SERPL-MCNC: 60 MG/DL
HGB BLD-MCNC: 14.3 G/DL (ref 11.5–15.4)
IMM GRANULOCYTES # BLD AUTO: 0.01 THOUSAND/UL (ref 0–0.2)
IMM GRANULOCYTES NFR BLD AUTO: 0 % (ref 0–2)
LDLC SERPL CALC-MCNC: 129 MG/DL (ref 0–100)
LYMPHOCYTES # BLD AUTO: 1.49 THOUSANDS/ÂΜL (ref 0.6–4.47)
LYMPHOCYTES NFR BLD AUTO: 24 % (ref 14–44)
MCH RBC QN AUTO: 28.1 PG (ref 26.8–34.3)
MCHC RBC AUTO-ENTMCNC: 32.2 G/DL (ref 31.4–37.4)
MCV RBC AUTO: 87 FL (ref 82–98)
MONOCYTES # BLD AUTO: 0.44 THOUSAND/ÂΜL (ref 0.17–1.22)
MONOCYTES NFR BLD AUTO: 7 % (ref 4–12)
NEUTROPHILS # BLD AUTO: 4 THOUSANDS/ÂΜL (ref 1.85–7.62)
NEUTS SEG NFR BLD AUTO: 64 % (ref 43–75)
NONHDLC SERPL-MCNC: 149 MG/DL
NRBC BLD AUTO-RTO: 0 /100 WBCS
PLATELET # BLD AUTO: 452 THOUSANDS/UL (ref 149–390)
PMV BLD AUTO: 10.7 FL (ref 8.9–12.7)
POTASSIUM SERPL-SCNC: 4 MMOL/L (ref 3.5–5.3)
PROT SERPL-MCNC: 7.7 G/DL (ref 6.4–8.4)
RBC # BLD AUTO: 5.08 MILLION/UL (ref 3.81–5.12)
SODIUM SERPL-SCNC: 136 MMOL/L (ref 135–147)
TRIGL SERPL-MCNC: 98 MG/DL
TSH SERPL DL<=0.05 MIU/L-ACNC: 0.21 UIU/ML (ref 0.45–4.5)
WBC # BLD AUTO: 6.25 THOUSAND/UL (ref 4.31–10.16)

## 2023-03-28 ENCOUNTER — OFFICE VISIT (OUTPATIENT)
Dept: INTERNAL MEDICINE CLINIC | Facility: CLINIC | Age: 58
End: 2023-03-28

## 2023-03-28 VITALS
HEART RATE: 112 BPM | SYSTOLIC BLOOD PRESSURE: 128 MMHG | HEIGHT: 65 IN | DIASTOLIC BLOOD PRESSURE: 82 MMHG | TEMPERATURE: 97.6 F | WEIGHT: 209.6 LBS | OXYGEN SATURATION: 99 % | BODY MASS INDEX: 34.92 KG/M2

## 2023-03-28 DIAGNOSIS — F41.9 ANXIETY: ICD-10-CM

## 2023-03-28 DIAGNOSIS — E78.2 MIXED HYPERLIPIDEMIA: ICD-10-CM

## 2023-03-28 DIAGNOSIS — R79.89 LOW TSH LEVEL: ICD-10-CM

## 2023-03-28 DIAGNOSIS — E66.09 CLASS 1 OBESITY DUE TO EXCESS CALORIES WITH SERIOUS COMORBIDITY AND BODY MASS INDEX (BMI) OF 34.0 TO 34.9 IN ADULT: ICD-10-CM

## 2023-03-28 DIAGNOSIS — R73.02 IMPAIRED GLUCOSE TOLERANCE: ICD-10-CM

## 2023-03-28 DIAGNOSIS — I10 ESSENTIAL HYPERTENSION: Primary | ICD-10-CM

## 2023-03-28 NOTE — PROGRESS NOTES
Assessment/Plan:    No problem-specific Assessment & Plan notes found for this encounter  Diagnoses and all orders for this visit:    Essential hypertension  -     CBC and differential; Future  -     Comprehensive metabolic panel; Future    Impaired glucose tolerance  -     Discontinue: metFORMIN (GLUCOPHAGE) 500 mg tablet; Take 2 tablets (1,000 mg total) by mouth 2 (two) times a day with meals  -     Comprehensive metabolic panel; Future  -     Hemoglobin A1C; Future  -     metFORMIN (GLUCOPHAGE) 500 mg tablet; Take 2 tablets (1,000 mg total) by mouth 2 (two) times a day with meals    Mixed hyperlipidemia  -     Comprehensive metabolic panel; Future  -     Lipid panel; Future  -     TSH, 3rd generation; Future    Low TSH level  -     TSH, 3rd generation; Future    Class 1 obesity due to excess calories with serious comorbidity and body mass index (BMI) of 34 0 to 34 9 in adult    Anxiety   Orders and recommendations as noted above  Reviewed laboratory testing with her  Cholesterol remains above goal   Discussed goal of LDL less than 100  Discussed use of statin but she would like to try dietary changes first   Discussed lifestyle and dietary changes at length  Blood sugar and hemoglobin A1c mildly elevated  We will start her on metformin as noted above  Discussed with her gradually starting this over the next 1 to 2 months  Potential side effects discussed  Blood pressure controlled  Continue with the valsartan  TSH has been slightly low  Recommended testing and thyroid scan and uptake was previously ordered  Anxiety symptoms reviewed  She does have lorazepam at home but does not take this regularly  Watch for any worsening  Continue with mammograms yearly  Recommend regular colorectal cancer screening  Up-to-date on flu shot  We will have her follow-up in about 3 to 4 months or sooner if needed  Subjective:      Patient ID: Selma Hayes is a 65 y o  female      She presents for routine follow-up  Is very frustrated by her weight  Has not been exercising regularly  Tends to eat later in the day and does snack before bed  She is interested in options for weight loss  Did see weight management but was frustrated by their recommendations  Is concerned about her laboratory testing with her blood sugar increasing  Tolerating the valsartan without difficulty  Denies any headaches or localized weakness  Denies any chest pain or palpitations  Still with anxiety symptoms  She tends to deal with these relatively well  Often associated with work  Would like to avoid statin medication if possible  The following portions of the patient's history were reviewed and updated as appropriate:   She  has a past medical history of Allergic and Anxiety  She   Patient Active Problem List    Diagnosis Date Noted   • Impaired glucose tolerance 09/27/2022   • Class 1 obesity with serious comorbidity and body mass index (BMI) of 34 0 to 34 9 in adult 03/22/2022   • Right sided sciatica 07/22/2021   • Low TSH level 07/30/2019   • Reactive depression 07/09/2019   • Mixed hyperlipidemia 06/06/2018   • Essential hypertension 06/06/2018   • Screening for colon cancer 06/06/2018   • Anxiety 06/06/2018   • Allergic rhinitis 06/06/2018     She  has a past surgical history that includes No past surgeries  Her family history includes Heart disease in her mother; Other in her father  She  reports that she has never smoked  She has never used smokeless tobacco  She reports that she does not drink alcohol and does not use drugs    Current Outpatient Medications   Medication Sig Dispense Refill   • fluticasone (FLONASE) 50 mcg/act nasal spray 1 spray into each nostril daily 16 g 5   • metFORMIN (GLUCOPHAGE) 500 mg tablet Take 2 tablets (1,000 mg total) by mouth 2 (two) times a day with meals 360 tablet 3   • valsartan (DIOVAN) 160 mg tablet Take 1 tablet (160 mg total) by mouth daily 90 tablet 3   • LORazepam (ATIVAN) "0 5 mg tablet Take 1 tablet (0 5 mg total) by mouth every 8 (eight) hours as needed for anxiety (Patient not taking: Reported on 3/28/2023) 60 tablet 1     No current facility-administered medications for this visit  Current Outpatient Medications on File Prior to Visit   Medication Sig   • fluticasone (FLONASE) 50 mcg/act nasal spray 1 spray into each nostril daily   • valsartan (DIOVAN) 160 mg tablet Take 1 tablet (160 mg total) by mouth daily   • LORazepam (ATIVAN) 0 5 mg tablet Take 1 tablet (0 5 mg total) by mouth every 8 (eight) hours as needed for anxiety (Patient not taking: Reported on 3/28/2023)     No current facility-administered medications on file prior to visit  She is allergic to measles mumps and rubella virus vaccine live       Review of Systems   Constitutional: Positive for activity change and unexpected weight change  Negative for appetite change, chills and fever  HENT: Negative for congestion and rhinorrhea  Eyes: Negative for visual disturbance  Respiratory: Negative for chest tightness and shortness of breath  Cardiovascular: Negative for chest pain and palpitations  Gastrointestinal: Negative for abdominal pain, blood in stool, diarrhea, nausea and vomiting  Endocrine: Negative for polydipsia, polyphagia and polyuria  Genitourinary: Negative for dysuria, frequency and urgency  Musculoskeletal: Negative for gait problem  Skin: Negative for color change  Neurological: Negative for dizziness and headaches  Hematological: Does not bruise/bleed easily  Psychiatric/Behavioral: Negative for confusion and sleep disturbance  The patient is nervous/anxious  Objective:      /82 (BP Location: Left arm, Patient Position: Sitting, Cuff Size: Large)   Pulse (!) 112   Temp 97 6 °F (36 4 °C)   Ht 5' 5\" (1 651 m)   Wt 95 1 kg (209 lb 9 6 oz)   SpO2 99%   BMI 34 88 kg/m²          Physical Exam  Vitals and nursing note reviewed     Constitutional:       " General: She is not in acute distress  Appearance: She is well-developed, well-groomed and overweight  HENT:      Head: Normocephalic and atraumatic  Eyes:      General:         Right eye: No discharge  Left eye: No discharge  Conjunctiva/sclera: Conjunctivae normal       Pupils: Pupils are equal, round, and reactive to light  Cardiovascular:      Rate and Rhythm: Normal rate and regular rhythm  Heart sounds: Normal heart sounds  No murmur heard  No friction rub  No gallop  Pulmonary:      Effort: No respiratory distress  Breath sounds: No wheezing or rales  Abdominal:      General: Bowel sounds are normal  There is no distension  Tenderness: There is no abdominal tenderness  Lymphadenopathy:      Cervical: No cervical adenopathy  Skin:     General: Skin is warm and dry  Neurological:      Mental Status: She is alert and oriented to person, place, and time  Psychiatric:         Mood and Affect: Affect normal  Mood is anxious  Speech: Speech normal          Behavior: Behavior normal  Behavior is cooperative           Cognition and Memory: Cognition and memory normal

## 2023-04-17 PROBLEM — J06.9 UPPER RESPIRATORY TRACT INFECTION: Status: ACTIVE | Noted: 2023-04-17

## 2023-05-01 ENCOUNTER — TELEPHONE (OUTPATIENT)
Dept: INTERNAL MEDICINE CLINIC | Facility: CLINIC | Age: 58
End: 2023-05-01

## 2023-05-01 ENCOUNTER — AMB VIDEO VISIT (OUTPATIENT)
Dept: OTHER | Facility: HOSPITAL | Age: 58
End: 2023-05-01

## 2023-05-01 DIAGNOSIS — R30.0 BURNING WITH URINATION: ICD-10-CM

## 2023-05-01 DIAGNOSIS — N39.0 URINARY TRACT INFECTION WITHOUT HEMATURIA, SITE UNSPECIFIED: Primary | ICD-10-CM

## 2023-05-01 DIAGNOSIS — R39.15 URGENCY OF URINATION: Primary | ICD-10-CM

## 2023-05-01 RX ORDER — NITROFURANTOIN 25; 75 MG/1; MG/1
100 CAPSULE ORAL 2 TIMES DAILY
Qty: 10 CAPSULE | Refills: 0 | Status: SHIPPED | OUTPATIENT
Start: 2023-05-01 | End: 2023-05-06

## 2023-05-01 NOTE — PATIENT INSTRUCTIONS
Start antibiotic  Take probiotic  Increase oral fluids  Tylenol or Motrin for pain or fever  Azo for bladder spasms  Follow up with PCP if no improvement  Go to ER with abd pain, flank pain or worsening symptoms  Obtain urine culture if no improvement as discussed as you are unable to go to day  Urinary Tract Infection in Women   WHAT YOU NEED TO KNOW:   A urinary tract infection (UTI) is caused by bacteria that get inside your urinary tract  Most bacteria that enter your urinary tract come out when you urinate  If the bacteria stay in your urinary tract, you may get an infection  Your urinary tract includes your kidneys, ureters, bladder, and urethra  Urine is made in your kidneys, and it flows from the ureters to the bladder  Urine leaves the bladder through the urethra  A UTI is more common in your lower urinary tract, which includes your bladder and urethra  DISCHARGE INSTRUCTIONS:   Return to the emergency department if:   You are urinating very little or not at all  You have a high fever with shaking chills  You have side or back pain that gets worse  Contact your healthcare provider if:   You have a fever  You do not feel better after 2 days of taking antibiotics  You are vomiting  You have questions or concerns about your condition or care  Medicines:   Antibiotics  help fight a bacterial infection  Medicines  may be given to decrease pain and burning when you urinate  They will also help decrease the feeling that you need to urinate often  These medicines will make your urine orange or red  Take your medicine as directed  Contact your healthcare provider if you think your medicine is not helping or if you have side effects  Tell him or her if you are allergic to any medicine  Keep a list of the medicines, vitamins, and herbs you take  Include the amounts, and when and why you take them  Bring the list or the pill bottles to follow-up visits   Carry your medicine list with you in case of an emergency  Follow up with your healthcare provider as directed:  Write down your questions so you remember to ask them during your visits  Prevent another UTI:   Empty your bladder often  Urinate and empty your bladder as soon as you feel the need  Do not hold your urine for long periods of time  Wipe from front to back after you urinate or have a bowel movement  This will help prevent germs from getting into your urinary tract through your urethra  Drink liquids as directed  Ask how much liquid to drink each day and which liquids are best for you  You may need to drink more liquids than usual to help flush out the bacteria  Do not drink alcohol, caffeine, or citrus juices  These can irritate your bladder and increase your symptoms  Your healthcare provider may recommend cranberry juice to help prevent a UTI  Urinate after you have sex  This can help flush out bacteria passed during sex  Do not douche or use feminine deodorants  These can change the chemical balance in your vagina  Change sanitary pads or tampons often  This will help prevent germs from getting into your urinary tract  Do pelvic muscle exercises often  Pelvic muscle exercises may help you start and stop urinating  Strong pelvic muscles may help you empty your bladder easier  Squeeze these muscles tightly for 5 seconds like you are trying to hold back urine  Then relax for 5 seconds  Gradually work up to squeezing for 10 seconds  Do 3 sets of 15 repetitions a day, or as directed  © 2017 [address] [employer] Information is for End User's use only and may not be sold, redistributed or otherwise used for commercial purposes  All illustrations and images included in CareNotes® are the copyrighted property of [employer]  or [employer]  The above information is an  only  It is not intended as medical advice for individual conditions or treatments   Talk to your doctor, nurse or pharmacist before following any medical regimen to see if it is safe and effective for you

## 2023-05-01 NOTE — PROGRESS NOTES
Video Visit - Fatou Fees 65 y o  female MRN: [id number]    REQUIRED DOCUMENTATION:         1  This service was provided via [facility]  2  Provider located at [address] [place] [id number] [phone]  3  [facility] provider: RILEY Cope  4  Identify all parties in room with patient during [facility] visit:  patient   5  After connecting through [employer], patient was identified by name and date of birth  Patient was then informed that this was a Telemedicine visit and that the exam was being conducted confidentially over secure lines  My office door was closed  No one else was in the room  Patient acknowledged consent and understanding of privacy and security of the Telemedicine visit  I informed the patient that I have reviewed their record in Epic and presented the opportunity for them to ask any questions regarding the visit today  The patient agreed to participate  This is a 65year old female here today for video visit  She states she thinks she has uti  She is having urgency and burning with urination  No fever  She denies any abd pain, back pain, vomiting  She states symptom started this AM   She states her last UTI was a couple years ago  She was recently sick and on zpack  She states she had bad cough and was using a pad  Review of Systems   Constitutional: Negative for activity change, chills, fatigue and fever  Respiratory: Negative  Cardiovascular: Negative  Genitourinary: Positive for dysuria, frequency and urgency  Psychiatric/Behavioral: Negative  Vitals:     Physical Exam  Constitutional:       General: She is not in acute distress  Appearance: Normal appearance  She is not ill-appearing or toxic-appearing  HENT:      Head: Normocephalic and atraumatic  Eyes:      Conjunctiva/sclera: Conjunctivae normal    Pulmonary:      Effort: Pulmonary effort is normal  No respiratory distress        Comments: No cough or audible wheezing  Abdominal:      Comments: No abd pain   Musculoskeletal:      Cervical back: Normal range of motion  Skin:     Comments: No rash on head or neck  Neurological:      Mental Status: She is alert and oriented to person, place, and time  Comments: No rash on head or neck  Psychiatric:         Mood and Affect: Mood normal          Behavior: Behavior normal          Thought Content: Thought content normal          Judgment: Judgment normal        Diagnoses and all orders for this visit:    Urinary tract infection without hematuria, site unspecified  -     nitrofurantoin (MACROBID) 100 mg capsule; Take 1 capsule (100 mg total) by mouth 2 (two) times a day for 5 days      There are no Patient Instructions on file for this visit  Follow up with PCP if not improved, if symptoms are worse, go to the ER

## 2023-05-01 NOTE — CARE ANYWHERE EVISITS
Visit Summary for WHIT YUNG - Gender: Female - Date of Birth: [id number]  Date: [id number] - Duration: 5 minutes  Patient: Michel Luis   [place]  Provider: Radha LIN    Patient Contact Information  Address  [address] [place] [address]; [address] [place] [address], [place] [address]  [phone]    Visit Topics  UTI [Added By: Self - 2023-05-01]    Triage Questions   What is your current physical address in the event of a medical emergency? Answer []  Are you allergic to any medications? Answer []  Are you now or could you be pregnant? Answer []  Do you have any immune system compromise or chronic lung   disease? Answer []  Do you have any vulnerable family members in the home (infant, pregnant, cancer, elderly)? Answer []     Conversation Transcripts  [0A][0A] [Notification] You are connected with Manuel Anguiano, Urgent Care Specialist [0A][Notification] Dolores Robledo is located in [place]  [0A][Notification] Dolores Robledo has shared health history  [facility]  [0A]    Diagnosis  Urinary tract infection, site not specified    Procedures  Value: [id number] Code: CPT-4 UNLISTED E&M SERVICE    Medications Prescribed    No prescriptions ordered    Electronically signed by: Manuel Grayson(NPI [id number])

## 2023-05-01 NOTE — TELEPHONE ENCOUNTER
Patient called stating that she thinks she has a UTI because she has had them before  She has burning when urinating and urgency  Dianne Going put the labs in chart

## 2023-06-22 NOTE — PROGRESS NOTES
Assessment/Plan:    Class 1 obesity  -Patient is pursuing Conservative Program  -Initial weight loss goal of 5-10% weight loss for improved health  -Screening labs: CMP, TSH, LP, A1c reviewed from 3/25/23  -Dietary recall reviewed, suggestions provided  -On metformin and focusing on lifestyle changes, but experiencing weight weight loss plateau  Discussed Saxenda and other AOM like Contrave and Topamax  For now she prefers to continue current plan  -Dietary recall reviewed, suggestions provided  Add protein to lunch  Initial: 209  Current: 198  Change: -11  Goal:    Follow up in approximately 3 months with Non-Surgical Physician/Advanced Practitioner  Goals:  Food log (ie ) www myfitnesspal com,sparkpeople  com,loseit com,calorieking  com,etc  baritastic  No sugary beverages  At least 64oz of water daily  Increase physical activity by 10 minutes daily  Gradually increase physical activity to a goal of 5 days per week for 30 minutes of MODERATE intensity PLUS 2 days per week of FULL BODY resistance training  5-10 servings of fruits and vegetables per day and 25-35 grams of dietary fiber per day, gradually increasing   If choosing starch pick whole grain/complex carbs and keep to 1/2 cup: oatmeal, brown rice, quinoa, whole wheat pasta, potatoes, sweet potato, chickpea noodles, red lentil noodles  Measure all portions: creamers, sugars, cooking oils/butters, condiments, dressings, etc and log calories  Keep nuts to 1/4 c  Consider Ozempic or Saxenda     Diagnoses and all orders for this visit:    Class 1 obesity    Body mass index 32 0-32 9, adult    Subjective:   Chief Complaint   Patient presents with   • Follow-up     Patient ID: Marian Santana  is a 65 y o  female with excess weight/obesity here to pursue weight management  Patient is pursuing Conservative Program      HPI  The patient presents for MWM follow up       Since consult was started on metformin by her PCP and feels this has helped with her weight "loss  Focusing on food choices and portions    B: eggs/egg whites + spinach and tomato OR yogurt OR cheerios  S: rice cakes OR pistachios OR pretzels (no longer snacking on candy!)  L: fruit OR salad  S: no  D: taco salad  S: on the weekends- ice cream/dessert    Exercise: trying to walk more, but dealing with sciatic pain  Hydration: increasing water    The following portions of the patient's history were reviewed and updated as appropriate: allergies, current medications, past family history, past medical history, past social history, past surgical history and problem list     Review of Systems    Objective:    /90 (BP Location: Left arm, Cuff Size: Large)   Pulse 83   Temp (!) 96 °F (35 6 °C) (Temporal)   Ht 5' 5\" (1 651 m)   Wt 89 9 kg (198 lb 3 2 oz)   SpO2 98%   BMI 32 98 kg/m²      Physical Exam  Vitals and nursing note reviewed  Constitutional   General appearance: Abnormal   well developed and obese  Pulmonary   Respiratory effort: No increased work of breathing or signs of respiratory distress  Abdomen   Abdomen: Abnormal   The abdomen was obese  Musculoskeletal   Gait and station: Normal     Psychiatric   Orientation to person, place and time: Normal     Affect: appropriate    30 minute visit, >50% time spent counseling patient on diet behavior and exercise modification for weight loss      "

## 2023-06-26 ENCOUNTER — OFFICE VISIT (OUTPATIENT)
Dept: BARIATRICS | Facility: CLINIC | Age: 58
End: 2023-06-26
Payer: COMMERCIAL

## 2023-06-26 VITALS
HEIGHT: 65 IN | HEART RATE: 83 BPM | SYSTOLIC BLOOD PRESSURE: 130 MMHG | TEMPERATURE: 96 F | WEIGHT: 198.2 LBS | BODY MASS INDEX: 33.02 KG/M2 | OXYGEN SATURATION: 98 % | DIASTOLIC BLOOD PRESSURE: 90 MMHG

## 2023-06-26 DIAGNOSIS — E66.9 CLASS 1 OBESITY: Primary | ICD-10-CM

## 2023-06-26 PROCEDURE — 99214 OFFICE O/P EST MOD 30 MIN: CPT | Performed by: PHYSICIAN ASSISTANT

## 2023-06-26 RX ORDER — FEXOFENADINE HCL 180 MG/1
180 TABLET ORAL DAILY
COMMUNITY

## 2023-06-26 NOTE — ASSESSMENT & PLAN NOTE
-Patient is pursuing Conservative Program  -Initial weight loss goal of 5-10% weight loss for improved health  -Screening labs: CMP, TSH, LP, A1c reviewed from 3/25/23  -Dietary recall reviewed, suggestions provided  -On metformin and focusing on lifestyle changes, but experiencing weight weight loss plateau  Discussed Saxenda and other AOM like Contrave and Topamax  For now she prefers to continue current plan  -Dietary recall reviewed, suggestions provided  Add protein to lunch       Initial: 209  Current: 198  Change: -11  Goal:

## 2023-06-26 NOTE — PATIENT INSTRUCTIONS
Goals: Food log (ie ) www myfitnesspal com,sparkpeople  com,loseit com,calorieking  com,etc  baritastic  No sugary beverages  At least 64oz of water daily  Increase physical activity by 10 minutes daily   Gradually increase physical activity to a goal of 5 days per week for 30 minutes of MODERATE intensity PLUS 2 days per week of FULL BODY resistance training  5-10 servings of fruits and vegetables per day and 25-35 grams of dietary fiber per day, gradually increasing   If choosing starch pick whole grain/complex carbs and keep to 1/2 cup: oatmeal, brown rice, quinoa, whole wheat pasta, potatoes, sweet potato, chickpea noodles, red lentil noodles  Measure all portions: creamers, sugars, cooking oils/butters, condiments, dressings, etc and log calories  Keep nuts to 1/4 c  Consider Ozempic (through PCP) or Saxenda  Can also consider Contrave

## 2023-08-12 ENCOUNTER — APPOINTMENT (OUTPATIENT)
Dept: LAB | Facility: MEDICAL CENTER | Age: 58
End: 2023-08-12
Payer: COMMERCIAL

## 2023-08-12 DIAGNOSIS — R79.89 LOW TSH LEVEL: ICD-10-CM

## 2023-08-12 DIAGNOSIS — R39.15 URGENCY OF URINATION: ICD-10-CM

## 2023-08-12 DIAGNOSIS — R30.0 BURNING WITH URINATION: ICD-10-CM

## 2023-08-12 DIAGNOSIS — E78.2 MIXED HYPERLIPIDEMIA: ICD-10-CM

## 2023-08-12 DIAGNOSIS — I10 ESSENTIAL HYPERTENSION: ICD-10-CM

## 2023-08-12 DIAGNOSIS — R73.02 IMPAIRED GLUCOSE TOLERANCE: ICD-10-CM

## 2023-08-12 LAB
ALBUMIN SERPL BCP-MCNC: 3.5 G/DL (ref 3.5–5)
ALP SERPL-CCNC: 91 U/L (ref 46–116)
ALT SERPL W P-5'-P-CCNC: 29 U/L (ref 12–78)
ANION GAP SERPL CALCULATED.3IONS-SCNC: 2 MMOL/L
AST SERPL W P-5'-P-CCNC: 16 U/L (ref 5–45)
BASOPHILS # BLD AUTO: 0.06 THOUSANDS/ÂΜL (ref 0–0.1)
BASOPHILS NFR BLD AUTO: 1 % (ref 0–1)
BILIRUB SERPL-MCNC: 0.74 MG/DL (ref 0.2–1)
BUN SERPL-MCNC: 16 MG/DL (ref 5–25)
CALCIUM SERPL-MCNC: 9.7 MG/DL (ref 8.3–10.1)
CHLORIDE SERPL-SCNC: 110 MMOL/L (ref 96–108)
CHOLEST SERPL-MCNC: 203 MG/DL
CO2 SERPL-SCNC: 28 MMOL/L (ref 21–32)
CREAT SERPL-MCNC: 0.96 MG/DL (ref 0.6–1.3)
EOSINOPHIL # BLD AUTO: 0.23 THOUSAND/ÂΜL (ref 0–0.61)
EOSINOPHIL NFR BLD AUTO: 3 % (ref 0–6)
ERYTHROCYTE [DISTWIDTH] IN BLOOD BY AUTOMATED COUNT: 14 % (ref 11.6–15.1)
GFR SERPL CREATININE-BSD FRML MDRD: 66 ML/MIN/1.73SQ M
GLUCOSE P FAST SERPL-MCNC: 97 MG/DL (ref 65–99)
HCT VFR BLD AUTO: 43.6 % (ref 34.8–46.1)
HDLC SERPL-MCNC: 64 MG/DL
HGB BLD-MCNC: 14.3 G/DL (ref 11.5–15.4)
IMM GRANULOCYTES # BLD AUTO: 0.02 THOUSAND/UL (ref 0–0.2)
IMM GRANULOCYTES NFR BLD AUTO: 0 % (ref 0–2)
LDLC SERPL CALC-MCNC: 128 MG/DL (ref 0–100)
LYMPHOCYTES # BLD AUTO: 1.64 THOUSANDS/ÂΜL (ref 0.6–4.47)
LYMPHOCYTES NFR BLD AUTO: 21 % (ref 14–44)
MCH RBC QN AUTO: 29.1 PG (ref 26.8–34.3)
MCHC RBC AUTO-ENTMCNC: 32.8 G/DL (ref 31.4–37.4)
MCV RBC AUTO: 89 FL (ref 82–98)
MONOCYTES # BLD AUTO: 0.58 THOUSAND/ÂΜL (ref 0.17–1.22)
MONOCYTES NFR BLD AUTO: 7 % (ref 4–12)
NEUTROPHILS # BLD AUTO: 5.38 THOUSANDS/ÂΜL (ref 1.85–7.62)
NEUTS SEG NFR BLD AUTO: 68 % (ref 43–75)
NONHDLC SERPL-MCNC: 139 MG/DL
NRBC BLD AUTO-RTO: 0 /100 WBCS
PLATELET # BLD AUTO: 500 THOUSANDS/UL (ref 149–390)
PMV BLD AUTO: 10.7 FL (ref 8.9–12.7)
POTASSIUM SERPL-SCNC: 4.2 MMOL/L (ref 3.5–5.3)
PROT SERPL-MCNC: 8 G/DL (ref 6.4–8.4)
RBC # BLD AUTO: 4.91 MILLION/UL (ref 3.81–5.12)
SODIUM SERPL-SCNC: 140 MMOL/L (ref 135–147)
TRIGL SERPL-MCNC: 54 MG/DL
TSH SERPL DL<=0.05 MIU/L-ACNC: 0.2 UIU/ML (ref 0.45–4.5)
WBC # BLD AUTO: 7.91 THOUSAND/UL (ref 4.31–10.16)

## 2023-08-12 PROCEDURE — 84443 ASSAY THYROID STIM HORMONE: CPT

## 2023-08-12 PROCEDURE — 83036 HEMOGLOBIN GLYCOSYLATED A1C: CPT

## 2023-08-12 PROCEDURE — 80053 COMPREHEN METABOLIC PANEL: CPT

## 2023-08-12 PROCEDURE — 80061 LIPID PANEL: CPT

## 2023-08-12 PROCEDURE — 36415 COLL VENOUS BLD VENIPUNCTURE: CPT

## 2023-08-12 PROCEDURE — 85025 COMPLETE CBC W/AUTO DIFF WBC: CPT

## 2023-08-13 LAB
EST. AVERAGE GLUCOSE BLD GHB EST-MCNC: 117 MG/DL
HBA1C MFR BLD: 5.7 %

## 2023-08-31 ENCOUNTER — OFFICE VISIT (OUTPATIENT)
Dept: INTERNAL MEDICINE CLINIC | Facility: CLINIC | Age: 58
End: 2023-08-31
Payer: COMMERCIAL

## 2023-08-31 VITALS
OXYGEN SATURATION: 97 % | HEART RATE: 119 BPM | DIASTOLIC BLOOD PRESSURE: 80 MMHG | WEIGHT: 193.5 LBS | TEMPERATURE: 97.3 F | SYSTOLIC BLOOD PRESSURE: 126 MMHG | BODY MASS INDEX: 32.24 KG/M2 | HEIGHT: 65 IN

## 2023-08-31 DIAGNOSIS — I10 ESSENTIAL HYPERTENSION: Primary | ICD-10-CM

## 2023-08-31 DIAGNOSIS — F41.9 ANXIETY: ICD-10-CM

## 2023-08-31 DIAGNOSIS — R79.89 ELEVATED PLATELET COUNT: ICD-10-CM

## 2023-08-31 DIAGNOSIS — R73.02 IMPAIRED GLUCOSE TOLERANCE: ICD-10-CM

## 2023-08-31 DIAGNOSIS — E01.0 THYROMEGALY: ICD-10-CM

## 2023-08-31 DIAGNOSIS — Z12.31 VISIT FOR SCREENING MAMMOGRAM: ICD-10-CM

## 2023-08-31 DIAGNOSIS — J30.9 ALLERGIC RHINITIS, UNSPECIFIED SEASONALITY, UNSPECIFIED TRIGGER: ICD-10-CM

## 2023-08-31 DIAGNOSIS — E78.2 MIXED HYPERLIPIDEMIA: ICD-10-CM

## 2023-08-31 PROBLEM — J06.9 UPPER RESPIRATORY TRACT INFECTION: Status: RESOLVED | Noted: 2023-04-17 | Resolved: 2023-08-31

## 2023-08-31 PROCEDURE — 99396 PREV VISIT EST AGE 40-64: CPT | Performed by: FAMILY MEDICINE

## 2023-08-31 NOTE — ASSESSMENT & PLAN NOTE
Advised for an ultrasound of thyroid and further testing prior to starting medications. TSH also on the lower side. Discussed potential further investigation with this occluding possible referral to endocrinology versus thyroid scan and uptake depending on the results of the ultrasound.

## 2023-08-31 NOTE — ASSESSMENT & PLAN NOTE
If it continues to increase and elevated, we will refer to hematology. Tensional causes for this with her.

## 2023-08-31 NOTE — ASSESSMENT & PLAN NOTE
Advised to monitor diet and will recommend considering cholesterol medications. Continue with slow but steady weight loss.

## 2023-08-31 NOTE — ASSESSMENT & PLAN NOTE
Discussed the risks, benefits, and medical information of Saxenda. Advised that if she can afford [facility] Continue to monitor weight loss.

## 2023-08-31 NOTE — PROGRESS NOTES
[facility]    NAME: Artem Garcia  AGE: 63 y.o. SEX: female  : 1965     DATE: 2023     Assessment and Plan:     1. Essential hypertension  Assessment & Plan:  Blood pressure well controlled. Continue with the valsartan. Watch salt intake. 2. Impaired glucose tolerance  Assessment & Plan:  Hemoglobin A1c and blood sugars have improved. Continue with the metformin. Discussed other options for blood sugar control as well as weight loss. Continue to follow-up with weight management. Watch blood hydrate intake. Continue with slow but steady weight loss. Discussed Saxenda as well as Wegovy. She is going to consider these but needs the thyroid assessment prior to considering. 3. Thyromegaly  Assessment & Plan:  Advised for an ultrasound of thyroid and further testing prior to starting medications. TSH also on the lower side. Discussed potential further investigation with this occluding possible referral to endocrinology versus thyroid scan and uptake depending on the results of the ultrasound. Orders:  -     US thyroid; Future; Expected date: 2023    4. Mixed hyperlipidemia  Assessment & Plan:  Advised to monitor diet and will recommend considering cholesterol medications. Continue with slow but steady weight loss. 5. Allergic rhinitis, unspecified seasonality, unspecified trigger  Assessment & Plan:  Advised to monitor allergies. 6. Anxiety  Assessment & Plan:  Methods for stress relief discussed. Continue with the lorazepam on an as-needed basis but does not take this regularly. 7. Visit for screening mammogram  -     Mammo screening bilateral w 3d & cad; Future; Expected date: 2023    8. Elevated platelet count  Assessment & Plan:  If it continues to increase and elevated, we will refer to hematology. Tensional causes for this with her. Hair thinning.   Advised to add collagen protein and discussed the difference between biotin. Instructed if she has the unflavored collagen powder form, she can mix it with any liquid and advised to biotin discontinue taking it 2 weeks prior to blood work. Immunizations and preventive care screenings were discussed with the patient today. Appropriate education was printed on patient's after visit summary. Counseling:   - Advised to consider colonoscopy. - Placed and order for mammogram.    Counseling:  Alcohol/drug use: discussed moderation in alcohol intake, the recommendations for healthy alcohol use, and avoidance of illicit drug use. Dental Health: discussed importance of regular tooth brushing, flossing, and dental visits. Injury prevention: discussed safety/seat belts, safety helmets, smoke detectors, carbon dioxide detectors, and smoking near bedding or upholstery. Sexual health: discussed sexually transmitted diseases, partner selection, use of condoms, avoidance of unintended pregnancy, and contraceptive alternatives. Exercise: the importance of regular exercise/physical activity was discussed. Recommend exercise 3-5 times per week for at least 30 minutes. No follow-ups on file. Chief Complaint:     Chief Complaint   Patient presents with   • Annual Exam     F/U on weight       History of Present Illness:     Adult Annual Physical   Nelson Manriquez is a 13-year-old female who presents today for a comprehensive physical exam.    The patient reports "scratchy throat" which she believes is an allergy. She has been taking Allegra until she ran out of it and states she has been approximately 1 week without taking it and is currently waiting for the ordered refill. She has been doing well with her anxiety and reports she has not been taking Ativan.     She has lost weight and reports she saw weight management and was recommended to discontinue metformin and start a trial of Saxenda which she is aware that it is a daily injection and inquires its medical information, side effects, cost, and recommendation. She informed weight management that she would discuss it with me first prior to changing medication. She is confident that she has plantar fasciitis which she has been doing stretches. She still has sciatica, reports tight hamstrings which she has been doing stretches, and intermittent stiffness. She inquires if metformin is appropriate for her which she believes helped her lose weight approximately 0.5 pound a week. She reports hair thinning and has not been taking collagen protein. Diet and Physical Activity  - Diet/Nutrition: She has been increasing water, vegetables, and healthy food intake and has been managing her diet. - Exercise: She recently got a treadmill. Diet and Physical Activity  Diet/Nutrition: well balanced diet and limited junk food. Exercise: no formal exercise. General Health  Sleep: sleeps well. Hearing: normal - bilateral.  Vision: goes for regular eye exams. Dental: regular dental visits. Depression Screening  PHQ-2/9 Depression Screening    Little interest or pleasure in doing things: 0 - not at all  Feeling down, depressed, or hopeless: 0 - not at all  Trouble falling or staying asleep, or sleeping too much: 0 - not at all  Feeling tired or having little energy: 0 - not at all  Poor appetite or overeatin - not at all  Feeling bad about yourself - or that you are a failure or have let yourself or your family down: 0 - not at all  Trouble concentrating on things, such as reading the newspaper or watching television: 0 - not at all  Moving or speaking so slowly that other people could have noticed.  Or the opposite - being so fidgety or restless that you have been moving around a lot more than usual: 0 - not at all  Thoughts that you would be better off dead, or of hurting yourself in some way: 0 - not at all  PHQ-9 Score: 0   PHQ-9 Interpretation: No or Minimal depression           Past Medical History:     Past Medical History:   Diagnosis Date   • Allergic    • Anxiety       Past Surgical History:     Past Surgical History:   Procedure Laterality Date   • NO PAST SURGERIES        Social History:     Social History     Socioeconomic History   • Marital status: /Civil Union     Spouse name: None   • Number of children: None   • Years of education: None   • Highest education level: None   Occupational History   • None   Tobacco Use   • Smoking status: Never   • Smokeless tobacco: Never   Substance and Sexual Activity   • Alcohol use: No   • Drug use: No   • Sexual activity: None   Other Topics Concern   • None   Social History Narrative   • None     Social Determinants of Health     Financial Resource Strain: Not on file   Food Insecurity: Not on file   Transportation Needs: Not on file   Physical Activity: Not on file   Stress: Not on file   Social Connections: Not on file   Intimate Partner Violence: Not on file   Housing Stability: Not on file      Family History:     Family History   Problem Relation Age of Onset   • Heart disease Mother    • Other Father       Current Medications:     Current Outpatient Medications   Medication Sig Dispense Refill   • fexofenadine (ALLEGRA) 180 MG tablet Take 180 mg by mouth daily     • fluticasone (FLONASE) 50 mcg/act nasal spray 1 spray into each nostril daily 16 g 5   • metFORMIN (GLUCOPHAGE) 500 mg tablet Take 2 tablets (1,000 mg total) by mouth 2 (two) times a day with meals 360 tablet 3   • valsartan (DIOVAN) 160 mg tablet Take 1 tablet (160 mg total) by mouth daily 90 tablet 3   • LORazepam (ATIVAN) 0.5 mg tablet Take 1 tablet (0.5 mg total) by mouth every 8 (eight) hours as needed for anxiety (Patient not taking: Reported on 3/28/2023) 60 tablet 1     No current facility-administered medications for this visit. Allergies:      Allergies   Allergen Reactions   • Measles Mumps And Rubella Virus Vaccine Live Arthralgia Knee swelling        Review of Systems:     Review of Systems  Constitutional: Negative for activity change, appetite change, chills, and fever. HENT: Positive for "scratchy throat". Eyes: Negative for visual disturbance. Respiratory: Negative for chest tightness and shortness of breath. Cardiovascular: Negative for chest pain and palpitations. Gastrointestinal: Negative for abdominal pain, blood in stool, diarrhea, nausea, and vomiting. Endocrine: Negative for polydipsia, polyphagia, and polyuria. Genitourinary: Negative for dysuria, frequency, and urgency. Musculoskeletal: Positive for plantar fasciitis and tight hamstring. Skin: Negative for color change. Neurological: Negative for dizziness and headaches. Hematological: Does not bruise/bleed easily. Psychiatric/Behavioral: Negative for confusion and sleep disturbance. The patient is not nervous/anxious. Physical Exam:   /80 (BP Location: Left arm, Patient Position: Sitting, Cuff Size: Large)   Pulse (!) 119   Temp (!) 97.3 °F (36.3 °C)   Ht 5' 5" (1.651 m)   Wt 87.8 kg (193 lb 8 oz)   SpO2 97%   BMI 32.20 kg/m²      Physical Exam  Vitals and nursing note reviewed. Constitutional:       General: She is not in acute distress. Appearance: She is well-developed, well-groomed and overweight. HENT:      Head: Normocephalic and atraumatic. Comments: Boggy nasal mucosa with clear nasal discharge; slight dryness to ear canals bilaterally  Eyes:      Conjunctiva/sclera: Conjunctivae normal.   Neck:      Thyroid: Thyromegaly present. Cardiovascular:      Rate and Rhythm: Normal rate and regular rhythm. Heart sounds: No murmur heard. Pulmonary:      Effort: Pulmonary effort is normal. No respiratory distress. Breath sounds: Normal breath sounds. Abdominal:      Palpations: Abdomen is soft. Tenderness: There is no abdominal tenderness. Musculoskeletal:         General: No swelling.       Cervical back: Neck supple. Skin:     General: Skin is warm and dry. Capillary Refill: Capillary refill takes less than 2 seconds. Neurological:      Mental Status: She is alert. Psychiatric:         Mood and Affect: Mood and affect normal.         Speech: Speech normal.         Behavior: Behavior is cooperative. Cognition and Memory: Cognition and memory normal.          I have personally reviewed results with the patient. Below is the patient's most recent value for Albumin, ALT, AST, BUN, Calcium, Chloride, Cholesterol, CO2, Creatinine, GFR, Glucose, HDL, Hematocrit, Hemoglobin, Hemoglobin A1C, LDL, Magnesium, Phosphorus, Platelets, Potassium, PSA, Sodium, Triglycerides, and WBC. Lab Results   Component Value Date    ALT 29 08/12/2023    AST 16 08/12/2023    BUN 16 08/12/2023    CALCIUM 9.7 08/12/2023     (H) 08/12/2023    CHOL 226 08/17/2015    CO2 28 08/12/2023    CREATININE 0.96 08/12/2023    HDL 64 08/12/2023    HCT 43.6 08/12/2023    HGB 14.3 08/12/2023    HGBA1C 5.7 (H) 08/12/2023     (H) 08/12/2023    K 4.2 08/12/2023    TRIG 54 08/12/2023    WBC 7.91 08/12/2023     Note: for a comprehensive list of the patient's lab results, access the Results Review activity. Recent labs:  Blood glucose was 97 mg/dL. Hemoglobin A1c was 5.7%. Cholesterol level was 203 mg/dL. LDL was 128 mg/dL. Platelet count was 140. Javier Sigala MD  [facility] [address]    Transcribed for Javier Sigala MD, by Kailash Kapoor on 08/31/23 at 9:00 PM. Powered by [employer].

## 2023-09-01 NOTE — ASSESSMENT & PLAN NOTE
Hemoglobin A1c and blood sugars have improved. Continue with the metformin. Discussed other options for blood sugar control as well as weight loss. Continue to follow-up with weight management. Watch blood hydrate intake. Continue with slow but steady weight loss. Discussed Saxenda as well as Wegovy. She is going to consider these but needs the thyroid assessment prior to considering.

## 2023-09-01 NOTE — ASSESSMENT & PLAN NOTE
Methods for stress relief discussed. Continue with the lorazepam on an as-needed basis but does not take this regularly.

## 2023-09-07 ENCOUNTER — HOSPITAL ENCOUNTER (OUTPATIENT)
Dept: ULTRASOUND IMAGING | Facility: HOSPITAL | Age: 58
Discharge: HOME | End: 2023-09-07
Payer: COMMERCIAL

## 2023-09-07 DIAGNOSIS — E01.0 THYROMEGALY: ICD-10-CM

## 2023-09-07 PROCEDURE — 76536 US EXAM OF HEAD AND NECK: CPT

## 2023-09-25 ENCOUNTER — TELEPHONE (OUTPATIENT)
Dept: INTERNAL MEDICINE CLINIC | Facility: CLINIC | Age: 58
End: 2023-09-25

## 2023-09-25 DIAGNOSIS — E01.0 THYROMEGALY: Primary | ICD-10-CM

## 2023-09-25 DIAGNOSIS — E04.2 MULTIPLE THYROID NODULES: ICD-10-CM

## 2023-09-26 NOTE — TELEPHONE ENCOUNTER
1 nodule is larger than the rest and should be biopsied. We will refer her to ENT for further investigation.

## 2023-10-13 ENCOUNTER — OFFICE VISIT (OUTPATIENT)
Dept: OTOLARYNGOLOGY | Facility: CLINIC | Age: 58
End: 2023-10-13
Payer: COMMERCIAL

## 2023-10-13 VITALS
HEIGHT: 65 IN | TEMPERATURE: 97.3 F | OXYGEN SATURATION: 99 % | WEIGHT: 191.6 LBS | HEART RATE: 78 BPM | DIASTOLIC BLOOD PRESSURE: 70 MMHG | SYSTOLIC BLOOD PRESSURE: 132 MMHG | BODY MASS INDEX: 31.92 KG/M2

## 2023-10-13 DIAGNOSIS — E04.2 MULTIPLE THYROID NODULES: ICD-10-CM

## 2023-10-13 DIAGNOSIS — E01.0 THYROMEGALY: Primary | ICD-10-CM

## 2023-10-13 PROCEDURE — 99203 OFFICE O/P NEW LOW 30 MIN: CPT | Performed by: OTOLARYNGOLOGY

## 2023-10-13 NOTE — PROGRESS NOTES
Otolaryngology Clinic Visit  Name:  Manuelito Fernandez  MRN:  [id number]  Date:  10/13/2023 4:23 PM  ________________________________________________________________________       CHIEF COMPLAINT:   Thyroid Nodule      HPI:  Manuelito Fernandez is a 63 y.o. female with low TSH and thyroid nodules referred from PCP. Patient did not know the nodules were there until the US. She is worried. Patient reports no symptoms since as neck pain, SOB, dysphagia. She denies fatigue or changes in activity. No other acute concerns. PMHx:  Past Medical History:   Diagnosis Date    Allergic     Anxiety        PSHx:  Past Surgical History:   Procedure Laterality Date    NO PAST SURGERIES         FAMHx:  Family History   Problem Relation Age of Onset    Heart disease Mother     Other Father        SOCHx:  Social History     Socioeconomic History    Marital status: /Civil Union     Spouse name: None    Number of children: None    Years of education: None    Highest education level: None   Occupational History    None   Tobacco Use    Smoking status: Never    Smokeless tobacco: Never   Substance and Sexual Activity    Alcohol use: No    Drug use: No    Sexual activity: None   Other Topics Concern    None   Social History Narrative    None     Social Determinants of Health     Financial Resource Strain: Not on file   Food Insecurity: Not on file   Transportation Needs: Not on file   Physical Activity: Not on file   Stress: Not on file   Social Connections: Not on file   Intimate Partner Violence: Not on file   Housing Stability: Not on file       Allergies:   Allergies   Allergen Reactions    Measles Mumps And Rubella Virus Vaccine Live Arthralgia     Knee swelling        MEDS:  Reviewed    ROS:  As above       PHYSICAL EXAM:  /70 (BP Location: Right arm, Cuff Size: Large)   Pulse 78   Temp (!) 97.3 °F (36.3 °C) (Temporal)   Ht 5' 5" (1.651 m)   Wt 86.9 kg (191 lb 9.6 oz)   SpO2 99%   BMI 31.88 kg/m²   General: NAD, AOx4  Eyes: EOMI  Ears:  Right: ear canal normal, TM normal appearance, no fluid  Left: ear canal normal, TM normal appearance, no fluid  Nose:  No septal deviation, no inferior turbinate hypertrophy, no mass/lesions  Oral cavity:  No trismus, no mass/lesions, tonsils 1+  Neck: Trachea is midline; bilateral thyroid nodules, Salivary glands symmetrical, no masses/abnormality on palpation  Lymph:  No cervical lymphadenopathy  Skin:  No obvious facial lesions  Neuro: Face symmetrical, no obvious cranial nerve palsy. No focal deficits   Lungs:  Normal work of breathing, symmetrical chest expansion  Vascular: Well perfused    Procedures:      Medical Data Reviewed:  Records reviewed and summarized as in EPIC    Radiology:  US Thyroid    Labs:  THS, T3     Patient Active Problem List   Diagnosis    Mixed hyperlipidemia    Essential hypertension    Screening for colon cancer    Anxiety    Allergic rhinitis    Reactive depression    Low TSH level    Right sided sciatica    Class 1 obesity    Impaired glucose tolerance    Thyromegaly    Elevated platelet count       ASSESSMENT/PLAN:  Adi Gonzales is a 63 y.o. female with acute and chronic problems as above who presents with:    1. Thyromegaly    2. Multiple thyroid nodules        63 y.o. here today for further evaluation of thyroid nodules and low TSH. Explained thyroid nodules are common and the most common type of thyroid cancer is not aggressive. Patient felt better. Recommended bx of suspicious nodule under US guidance. Also recommended T4 lab test to assess for Graves.     RTC 3 weeks after US bx        Leonardo Pierson, PGY2  Otolaryngology--Head and Neck Surgery  [facility]  10/13/2023 4:23 PM

## 2023-11-27 ENCOUNTER — TELEPHONE (OUTPATIENT)
Dept: NON INVASIVE DIAGNOSTICS | Facility: HOSPITAL | Age: 58
End: 2023-11-27

## 2023-11-27 NOTE — TELEPHONE ENCOUNTER
Call placed to patient to discuss upcoming US thyroid biopsy at [facility]. Allergies reviewed and verified patient does not currently take any anticoagulant medications. Pre-procedure instructions discussed with patient. Patient instructed that she may eat normally and take medications as usual before the procedure. Procedure and post procedure expectations and instructions reviewed with the patient. Patient verbalizes understanding and denies any questions at this time.

## 2023-12-18 ENCOUNTER — APPOINTMENT (OUTPATIENT)
Dept: LAB | Facility: HOSPITAL | Age: 58
End: 2023-12-18
Payer: COMMERCIAL

## 2023-12-18 ENCOUNTER — HOSPITAL ENCOUNTER (OUTPATIENT)
Dept: ULTRASOUND IMAGING | Facility: HOSPITAL | Age: 58
Discharge: HOME | End: 2023-12-18
Attending: OTOLARYNGOLOGY
Payer: COMMERCIAL

## 2023-12-18 DIAGNOSIS — E01.0 THYROMEGALY: ICD-10-CM

## 2023-12-18 LAB
T4 FREE SERPL-MCNC: 1.27 NG/DL (ref 0.61–1.12)
TSH SERPL DL<=0.05 MIU/L-ACNC: 0.17 UIU/ML (ref 0.45–4.5)

## 2023-12-18 PROCEDURE — 10005 FNA BX W/US GDN 1ST LES: CPT

## 2023-12-18 PROCEDURE — 36415 COLL VENOUS BLD VENIPUNCTURE: CPT

## 2023-12-18 PROCEDURE — 84443 ASSAY THYROID STIM HORMONE: CPT

## 2023-12-18 PROCEDURE — 84439 ASSAY OF FREE THYROXINE: CPT

## 2023-12-18 PROCEDURE — 10006 FNA BX W/US GDN EA ADDL: CPT

## 2023-12-18 PROCEDURE — 88173 CYTOPATH EVAL FNA REPORT: CPT | Performed by: PATHOLOGY

## 2023-12-18 PROCEDURE — 10010 FNA BX W/CT GDN EA ADDL: CPT

## 2023-12-18 RX ORDER — LIDOCAINE HYDROCHLORIDE 10 MG/ML
5 INJECTION, SOLUTION EPIDURAL; INFILTRATION; INTRACAUDAL; PERINEURAL ONCE
Status: COMPLETED | OUTPATIENT
Start: 2023-12-18 | End: 2023-12-18

## 2023-12-18 RX ADMIN — LIDOCAINE HYDROCHLORIDE 5 ML: 10 INJECTION, SOLUTION EPIDURAL; INFILTRATION; INTRACAUDAL; PERINEURAL at 09:36

## 2023-12-20 PROCEDURE — 88173 CYTOPATH EVAL FNA REPORT: CPT | Performed by: PATHOLOGY

## 2023-12-21 DIAGNOSIS — I10 ESSENTIAL HYPERTENSION: ICD-10-CM

## 2023-12-21 RX ORDER — VALSARTAN 160 MG/1
160 TABLET ORAL DAILY
Qty: 90 TABLET | Refills: 0 | Status: SHIPPED | OUTPATIENT
Start: 2023-12-21

## 2024-02-12 ENCOUNTER — TELEPHONE (OUTPATIENT)
Dept: BARIATRICS | Facility: CLINIC | Age: 59
End: 2024-02-12

## 2024-02-12 ENCOUNTER — TELEPHONE (OUTPATIENT)
Dept: PAIN MEDICINE | Facility: CLINIC | Age: 59
End: 2024-02-12

## 2024-02-12 NOTE — TELEPHONE ENCOUNTER
Patient calling stating she does not have a way to o a virtual because her phone camera has been on the frits. She stated this appointment is to review her biopsy results from December. Janice's next available is not until march. I advised her I would check to see if we could just give her a phone call to discuss her results tomorrow so she doesn't have to wait another month for these results. Please call patient to let her know

## 2024-02-12 NOTE — TELEPHONE ENCOUNTER
Lm to have virtual appt for ent or cancel due to expected weather, gave guarnator number to call and reschedule

## 2024-02-12 NOTE — TELEPHONE ENCOUNTER
CLM that your pathology came back as benign. Which is good news. You not need surgery. She does not need to come in to review further but Dr Weinstein  would recommend that you obtain an US of her thyroid again in one year through Dr Chadwick's office .

## 2024-02-12 NOTE — TELEPHONE ENCOUNTER
----- Message from Gentry Weinstein MD sent at 2/9/2024  2:58 PM EST -----  You can let debbie know her pathology came back as benign. Which is good news. She does not need surgery. She does not need to come in to review further but I would recommend she obtain an US of her thyroid again in one year through Dr Oliveira office

## 2024-02-13 ENCOUNTER — TELEMEDICINE (OUTPATIENT)
Dept: OTOLARYNGOLOGY | Facility: CLINIC | Age: 59
End: 2024-02-13

## 2024-02-13 DIAGNOSIS — E04.2 MULTIPLE THYROID NODULES: ICD-10-CM

## 2024-02-13 DIAGNOSIS — E01.0 THYROMEGALY: Primary | ICD-10-CM

## 2024-02-13 PROCEDURE — 99024 POSTOP FOLLOW-UP VISIT: CPT | Performed by: OTOLARYNGOLOGY

## 2024-02-13 NOTE — PROGRESS NOTES
Otolaryngology Clinic Visit  Name:  Kaylee Ellington  MRN:  [id number]  Date:  2/13/2024 9:08 AM  ________________________________________________________________________       CHIEF COMPLAINT:   Thyroid Nodule      HPI:  Kaylee Ellington is a 58 y.o. female with low TSH and thyroid nodules referred from PCP. Patient did not know the nodules were there until the US. She is worried. Patient reports no symptoms since as neck pain, SOB, dysphagia. She denies fatigue or changes in activity.     No other acute concerns.     Intreval history  S/p biopsy and afirma.      PMHx:  Past Medical History:   Diagnosis Date    Allergic     Anxiety        PSHx:  Past Surgical History:   Procedure Laterality Date    NO PAST SURGERIES      US GUIDED THYROID BIOPSY  12/18/2023       FAMHx:  Family History   Problem Relation Age of Onset    Heart disease Mother     Other Father        SOCHx:  Social History     Socioeconomic History    Marital status: /Civil Union     Spouse name: Not on file    Number of children: Not on file    Years of education: Not on file    Highest education level: Not on file   Occupational History    Not on file   Tobacco Use    Smoking status: Never    Smokeless tobacco: Never   Substance and Sexual Activity    Alcohol use: No    Drug use: No    Sexual activity: Not on file   Other Topics Concern    Not on file   Social History Narrative    Not on file     Social Determinants of Health     Financial Resource Strain: Not on file   Food Insecurity: Not on file   Transportation Needs: Not on file   Physical Activity: Not on file   Stress: Not on file   Social Connections: Not on file   Intimate Partner Violence: Not on file   Housing Stability: Not on file       Allergies:  Allergies   Allergen Reactions    Measles Mumps And Rubella Virus Vaccine Live Arthralgia     Knee swelling        MEDS:  Reviewed    ROS:  As above       PHYSICAL EXAM:  There were no vitals taken for this visit.  General: NAD, AOx4  Eyes:   EOMI  Ears:  Right: ear canal normal, TM normal appearance, no fluid  Left: ear canal normal, TM normal appearance, no fluid  Nose:  No septal deviation, no inferior turbinate hypertrophy, no mass/lesions  Oral cavity:  No trismus, no mass/lesions, tonsils 1+  Neck: Trachea is midline; bilateral thyroid nodules, Salivary glands symmetrical, no masses/abnormality on palpation  Lymph:  No cervical lymphadenopathy  Skin:  No obvious facial lesions  Neuro:  Face symmetrical, no obvious cranial nerve palsy. No focal deficits   Lungs:  Normal work of breathing, symmetrical chest expansion  Vascular: Well perfused    virtual    Procedures:      Medical Data Reviewed:  Records reviewed and summarized as in [facility]    Radiology:    Right lobe: 6.0 x 2.7 x 2.7 cm. Volume 20.8 mL  Left lobe:  7.4 x 3.7 x 3.4 cm. Volume 45.3 mL  Isthmus: 0.8  cm.     Nodule #1.  Image 20.  RIGHT lower pole nodule measuring 1.9 x 1.2 x 1.5 cm.  COMPOSITION:  2 points, solid or almost completely solid .  ECHOGENICITY:  1 point, hyperechoic or isoechoic.  SHAPE:  0 points, wider-than-tall.  MARGIN: 0 points, ill-defined.  ECHOGENIC FOCI:  0 points, none or large comet-tail artifacts.  TI-RADS Classification: TR 3 (3 points), FNA if >2.5 cm.  Follow if >1.5 cm.     Nodule #2.  Image 23.  RIGHT lower pole nodule measuring 1.4 x 0.9 x 1.4 cm.  COMPOSITION:  2 points, solid or almost completely solid .  ECHOGENICITY:  1 point, hyperechoic or isoechoic.  SHAPE:  0 points, wider-than-tall.  MARGIN: 0 points, ill-defined.  ECHOGENIC FOCI:  0 points, none or large comet-tail artifacts.  TI-RADS Classification: TR 3 (3 points), FNA if >2.5 cm.  Follow if >1.5 cm.     Nodule #3.  Image 31.  Left isthmus measuring 2.6 x 2.3 x 2.5 cm.  COMPOSITION:  2 points, solid or almost completely solid .  ECHOGENICITY:  1 point, hyperechoic or isoechoic.  SHAPE:  0 points, wider-than-tall.  MARGIN: 0 points, ill-defined.  ECHOGENIC FOCI:  0 points, none or large comet-tail  artifacts.  TI-RADS Classification: TR 3 (3 points), FNA if >2.5 cm.  Follow if >1.5 cm.     Nodule #4.  Image 56.  LEFT lower pole nodule measuring 5.1 x 3.6 x 4.8 cm.  COMPOSITION:  1 point, mixed cystic and solid.  ECHOGENICITY:  1 point, hyperechoic or isoechoic.  SHAPE:  0 points, wider-than-tall.  MARGIN: 0 points, ill-defined.  ECHOGENIC FOCI:  0 points, none or large comet-tail artifacts.  TI-RADS Classification: TR 2 (2 points), Not suspicious. No FNA.          Labs:  THS, T3   Bethesa II, III and afirma benign    Patient Active Problem List   Diagnosis    Mixed hyperlipidemia    Essential hypertension    Screening for colon cancer    Anxiety    Allergic rhinitis    Reactive depression    Low TSH level    Right sided sciatica    Class 1 obesity    Impaired glucose tolerance    Thyromegaly    Elevated platelet count       ASSESSMENT/PLAN:  Kaylee Ellington is a 58 y.o. female with acute and chronic problems as above who presents with:    1. Thyromegaly    2. Multiple thyroid nodules        58 y.o. here today for further evaluation of thyroid nodules biopsy benign. Repeat US in 1 year. Thyroid panel and endo refferral, TFTs    Virtual visit today, left message. No charge.      Gentry Weinstein MD MPH  Otolaryngology--Head and Neck Surgery  [facility]  2/13/2024 9:13 AM

## 2024-02-14 ENCOUNTER — TELEPHONE (OUTPATIENT)
Dept: ENDOCRINOLOGY | Facility: CLINIC | Age: 59
End: 2024-02-14

## 2024-02-15 ENCOUNTER — TELEPHONE (OUTPATIENT)
Dept: OTOLARYNGOLOGY | Facility: CLINIC | Age: 59
End: 2024-02-15

## 2024-02-15 NOTE — TELEPHONE ENCOUNTER
TYLER for Kaylee, that I did mailed her the lab work, referral to [facility], and US that Dr Weinstein wants.   If she has any questions to please call [phone].

## 2024-02-15 NOTE — TELEPHONE ENCOUNTER
----- Message from Gentry Weinstein MD sent at 2/13/2024  9:18 AM EST -----  She didn't answer I left a message    She needs thyroid us in 1 year  Blood work  Endocrinology referral    No follow up with us.  Her nodules were benign so does not need surgery

## 2024-02-21 PROBLEM — Z12.11 SCREENING FOR COLON CANCER: Status: RESOLVED | Noted: 2018-06-06 | Resolved: 2024-02-21

## 2024-03-07 ENCOUNTER — CONSULT (OUTPATIENT)
Dept: ENDOCRINOLOGY | Facility: CLINIC | Age: 59
End: 2024-03-07
Payer: COMMERCIAL

## 2024-03-07 VITALS
HEIGHT: 65 IN | HEART RATE: 105 BPM | BODY MASS INDEX: 30.66 KG/M2 | WEIGHT: 184 LBS | SYSTOLIC BLOOD PRESSURE: 140 MMHG | DIASTOLIC BLOOD PRESSURE: 82 MMHG

## 2024-03-07 DIAGNOSIS — E05.90 HYPERTHYROIDISM: ICD-10-CM

## 2024-03-07 DIAGNOSIS — E04.2 MULTIPLE THYROID NODULES: Primary | ICD-10-CM

## 2024-03-07 PROCEDURE — 99245 OFF/OP CONSLTJ NEW/EST HI 55: CPT | Performed by: STUDENT IN AN ORGANIZED HEALTH CARE EDUCATION/TRAINING PROGRAM

## 2024-03-07 NOTE — PROGRESS NOTES
Kyalee Ellington 58 y.o. female MRN: [id number]    Encounter: [id number]      Assessment/Plan     Problem List Items Addressed This Visit     Thyromegaly - Primary     Left sided isthmus and left lower lobe nodule benign. Toxic goiter is suspected based on labs. Thyroid uptake is being ordered. Follow up thyroid imaging in 12-mo is recommended for surveillance, which I will assist with arranging.          Hyperthyroidism     Thyroid gland anatomy and physiology reviewed. Pathophysiology of hyperthyroidism discussed, including differential diagnoses and management (ATD, RAMOS, and thyroidectomy). Suspect toxic goiter. Will refer for thyroid uptake scan. Discussed plan to initiate ATD methimazole, likely 5 mg daily dosing pending results. Will arrange follow up labs in 6-8 weeks to monitor response, and to assess tolerability to therapy. I will contact patient with results and recommendations.          Relevant Orders    NM thyroid imaging w uptake(s)     RTC 8-weeks    I have spent a total time of 58 minutes on 03/07/24 in caring for this patient including Diagnostic results, Instructions for management, Impressions, Counseling / Coordination of care, Documenting in the medical record, Reviewing / ordering tests, medicine, procedures  , and Obtaining or reviewing history  .      CC: thyroid nodules    History of Present Illness     HPI:    Kaylee presents today for evaluation of hyperthyroidism and thyroid nodules. Had outpatient eval with findings on neck exam worrisome for thyroid disorder. Was referred for thyroid US revealing multi-nodular goiter with 2 left sided nodules (isthmus, left lower) being selected for FNA biopsy. Left isthmus benign, left lower 3.9 cm afirma benign. Thyroid labs have shown mildly low TSH with mildly elevated free T4. There is a history of anxiety, but no history of palpitations, tremors, diaphoresis. Kaylee reports weight loss roughly 30 lbs, which is deliberate. She denies any personal  "history of radiation exposure to head/neck. No family history of thyroid cancer.     Review of Systems   Constitutional:  Negative for diaphoresis and unexpected weight change.   Cardiovascular:  Negative for palpitations.   Endocrine: Negative for cold intolerance and heat intolerance.   Neurological:  Negative for tremors.   Psychiatric/Behavioral:  The patient is nervous/anxious.    All other systems reviewed and are negative.      Historical Information   Past Medical History:   Diagnosis Date   • Allergic    • Anxiety      Past Surgical History:   Procedure Laterality Date   • NO PAST SURGERIES     • US GUIDED THYROID BIOPSY  12/18/2023     Social History   Social History     Substance and Sexual Activity   Alcohol Use No     Social History     Substance and Sexual Activity   Drug Use No     Social History     Tobacco Use   Smoking Status Never   Smokeless Tobacco Never     Family History:   Family History   Problem Relation Age of Onset   • Heart disease Mother    • Other Father        Meds/Allergies   Current Outpatient Medications   Medication Sig Dispense Refill   • fexofenadine (ALLEGRA) 180 MG tablet Take 180 mg by mouth daily     • fluticasone (FLONASE) 50 mcg/act nasal spray 1 spray into each nostril daily 16 g 5   • LORazepam (ATIVAN) 0.5 mg tablet Take 1 tablet (0.5 mg total) by mouth every 8 (eight) hours as needed for anxiety 60 tablet 1   • metFORMIN (GLUCOPHAGE) 500 mg tablet Take 2 tablets (1,000 mg total) by mouth 2 (two) times a day with meals 360 tablet 3   • valsartan (DIOVAN) 160 mg tablet TAKE ONE TABLET BY MOUTH EVERY DAY 90 tablet 0     No current facility-administered medications for this visit.     Allergies   Allergen Reactions   • Measles Mumps And Rubella Virus Vaccine Live Arthralgia     Knee swelling       Objective   Vitals: Blood pressure 140/82, pulse 105, height 5' 5\" (1.651 m), weight 83.5 kg (184 lb).    Physical Exam  Vitals reviewed.   Constitutional:       Appearance: " "Normal appearance.   HENT:      Head: Normocephalic and atraumatic.      Nose: Nose normal.   Eyes:      General: No scleral icterus.     Conjunctiva/sclera: Conjunctivae normal.   Neck:      Thyroid: Thyroid mass and thyromegaly present. No thyroid tenderness.   Pulmonary:      Effort: Pulmonary effort is normal. No respiratory distress.   Musculoskeletal:         General: No deformity.      Cervical back: Normal range of motion.   Lymphadenopathy:      Cervical: No cervical adenopathy.   Neurological:      General: No focal deficit present.      Mental Status: She is alert.   Psychiatric:         Mood and Affect: Mood normal.         Behavior: Behavior normal.         The history was obtained from the review of the chart, patient.    Lab Results:   Lab Results   Component Value Date/Time    TSH 3RD GENERATON 0.172 (L) 12/18/2023 08:53 AM    TSH 3RD GENERATON 0.198 (L) 08/12/2023 10:33 AM    TSH 3RD GENERATON 0.208 (L) 03/25/2023 09:46 AM    Free T4 1.27 (H) 12/18/2023 08:53 AM     1.11.24      Imaging Studies:   Results for orders placed during the hospital encounter of 09/07/23    US thyroid    Impression  Enlarged gland with multiple nodules.  The following meet current ACR criteria for recommending ultrasound guided biopsy:    TR 3 left isthmus nodule in image 31 measuring 2.6 x 2.3 x 2.5 cm.    Reference: ACR Thyroid Imaging, Reporting and Data System (TI-RADS): White Paper of the ACR TI-RADS Committee. J AM Ines Radiol 2017;14:587-595. (additional recommendations based on American Thyroid Association 2015 guidelines.)      Workstation performed: [id number]      I have personally reviewed pertinent reports.      Portions of the record may have been created with voice recognition software. Occasional wrong word or \"sound a like\" substitutions may have occurred due to the inherent limitations of voice recognition software. Read the chart carefully and recognize, using context, where substitutions have " occurred.

## 2024-03-22 DIAGNOSIS — I10 ESSENTIAL HYPERTENSION: ICD-10-CM

## 2024-03-22 DIAGNOSIS — R73.02 IMPAIRED GLUCOSE TOLERANCE: ICD-10-CM

## 2024-03-25 ENCOUNTER — HOSPITAL ENCOUNTER (OUTPATIENT)
Dept: NUCLEAR MEDICINE | Facility: HOSPITAL | Age: 59
Discharge: HOME | End: 2024-03-25
Attending: STUDENT IN AN ORGANIZED HEALTH CARE EDUCATION/TRAINING PROGRAM
Payer: COMMERCIAL

## 2024-03-25 DIAGNOSIS — E04.2 MULTIPLE THYROID NODULES: ICD-10-CM

## 2024-03-25 DIAGNOSIS — E05.90 HYPERTHYROIDISM: ICD-10-CM

## 2024-03-25 PROCEDURE — A9516 IODINE I-123 SOD IODIDE MIC: HCPCS

## 2024-03-25 PROCEDURE — 78014 THYROID IMAGING W/BLOOD FLOW: CPT

## 2024-03-25 RX ORDER — VALSARTAN 160 MG/1
160 TABLET ORAL DAILY
Qty: 90 TABLET | Refills: 0 | Status: SHIPPED | OUTPATIENT
Start: 2024-03-25 | End: 2024-03-27 | Stop reason: SDUPTHER

## 2024-03-26 ENCOUNTER — HOSPITAL ENCOUNTER (OUTPATIENT)
Dept: NUCLEAR MEDICINE | Facility: HOSPITAL | Age: 59
Discharge: HOME | End: 2024-03-26
Attending: STUDENT IN AN ORGANIZED HEALTH CARE EDUCATION/TRAINING PROGRAM
Payer: COMMERCIAL

## 2024-03-26 ENCOUNTER — TELEPHONE (OUTPATIENT)
Age: 59
End: 2024-03-26

## 2024-03-26 DIAGNOSIS — E05.90 HYPERTHYROIDISM: Primary | ICD-10-CM

## 2024-03-26 RX ORDER — METHIMAZOLE 5 MG/1
5 TABLET ORAL DAILY
Qty: 30 TABLET | Refills: 1 | Status: SHIPPED | OUTPATIENT
Start: 2024-03-26

## 2024-03-26 NOTE — TELEPHONE ENCOUNTER
Patient calling for results. Made aware of Dr. Reinoso message:    Nicolás Wright Kemar, DO  3/26/2024 12:54 PM EDT    Can Kaylee be made aware that her uptake scan revealed mild increased uptake of iodine at the 6h margarito. Because her TSH has been persistently low, I would like to recommend treatment with methimazole, a medication to lower thyroid hormone. We had discussed this at our visit. Would proceed with 5 mg daily dosing. If patient is agreeable, I'd like to have labs 6-weeks after starting therapy to assess response, and have her come in for a visit to review. Please let me know and I can order medication and labs    Patient verbalized understanding.   Patient is agreeable to start methimazole. Please send to [facility]. Aware to get labs drawn in 6 weeks and f/u appt scheduled.

## 2024-03-27 DIAGNOSIS — I10 ESSENTIAL HYPERTENSION: ICD-10-CM

## 2024-03-27 DIAGNOSIS — R73.02 IMPAIRED GLUCOSE TOLERANCE: ICD-10-CM

## 2024-03-27 DIAGNOSIS — J30.9 ALLERGIC RHINITIS, UNSPECIFIED SEASONALITY, UNSPECIFIED TRIGGER: Primary | ICD-10-CM

## 2024-03-28 DIAGNOSIS — R73.02 IMPAIRED GLUCOSE TOLERANCE: ICD-10-CM

## 2024-03-29 ENCOUNTER — TELEPHONE (OUTPATIENT)
Age: 59
End: 2024-03-29

## 2024-03-29 NOTE — TELEPHONE ENCOUNTER
Patient called back, and said she spoke with someone on Tuesday. She got the updates on the methimazole and her lab work scheduled. No further questions at this time.

## 2024-03-31 RX ORDER — VALSARTAN 160 MG/1
160 TABLET ORAL DAILY
Qty: 90 TABLET | Refills: 0 | Status: SHIPPED | OUTPATIENT
Start: 2024-03-31

## 2024-03-31 RX ORDER — FEXOFENADINE HCL 180 MG/1
180 TABLET ORAL DAILY
Qty: 90 TABLET | Refills: 3 | Status: SHIPPED | OUTPATIENT
Start: 2024-03-31

## 2024-04-01 NOTE — TELEPHONE ENCOUNTER
Patient calling said she is returning Val's call.  Looks like Val called patient on this results note.  Patient said she was already aware of results and did not yet start med, is awaiting the delivery from pharmacy. Patient will repeat labs as ordered.

## 2024-04-08 ENCOUNTER — TELEPHONE (OUTPATIENT)
Age: 59
End: 2024-04-08

## 2024-04-08 NOTE — TELEPHONE ENCOUNTER
Patient keeps getting phone calls from the office, but she already spoke with someone about her labs and the methimazole medication.    She started the treatment about a week ago, scheduled her follow up appointment and is planning to get the labs redrawn a week prior to that appointment. There is nothing else in the chart that shows anyone is calling her for any other reason. She doesn't want us to think she isn't calling back, but there is nothing else that needs to be discussed.    If there is something else to be discussed other than her labs / methimazole, please reach out to her. Otherwise, everything is settled on her end and she does not have any further questions or concerns.     I attempted to reach someone in the office to see if we could settle this now, but no one was available.

## 2024-04-09 NOTE — TELEPHONE ENCOUNTER
Patient calling about this, said she keeps getting calls.  Already received her results.  Unable to find any additional note.  She states she keeps getting calls to speak with someone in the office not the pod. Kayleigh transferred patient to Laura in the office.

## 2024-04-23 ENCOUNTER — APPOINTMENT (OUTPATIENT)
Dept: LAB | Facility: HOSPITAL | Age: 59
End: 2024-04-23
Payer: COMMERCIAL

## 2024-04-23 ENCOUNTER — OFFICE VISIT (OUTPATIENT)
Dept: INTERNAL MEDICINE CLINIC | Facility: CLINIC | Age: 59
End: 2024-04-23
Payer: COMMERCIAL

## 2024-04-23 VITALS
SYSTOLIC BLOOD PRESSURE: 124 MMHG | DIASTOLIC BLOOD PRESSURE: 82 MMHG | OXYGEN SATURATION: 100 % | TEMPERATURE: 97.8 F | HEIGHT: 65 IN | BODY MASS INDEX: 30.79 KG/M2 | WEIGHT: 184.8 LBS | HEART RATE: 104 BPM

## 2024-04-23 DIAGNOSIS — K11.1 ENLARGED SALIVARY GLAND: ICD-10-CM

## 2024-04-23 DIAGNOSIS — E78.2 MIXED HYPERLIPIDEMIA: ICD-10-CM

## 2024-04-23 DIAGNOSIS — E05.90 HYPERTHYROIDISM: ICD-10-CM

## 2024-04-23 DIAGNOSIS — R73.02 IMPAIRED GLUCOSE TOLERANCE: ICD-10-CM

## 2024-04-23 DIAGNOSIS — Z12.31 VISIT FOR SCREENING MAMMOGRAM: ICD-10-CM

## 2024-04-23 DIAGNOSIS — I10 ESSENTIAL HYPERTENSION: Primary | ICD-10-CM

## 2024-04-23 DIAGNOSIS — Z78.0 POSTMENOPAUSAL: ICD-10-CM

## 2024-04-23 DIAGNOSIS — Z12.11 SCREENING FOR COLON CANCER: ICD-10-CM

## 2024-04-23 PROCEDURE — 86735 MUMPS ANTIBODY: CPT

## 2024-04-23 PROCEDURE — 99214 OFFICE O/P EST MOD 30 MIN: CPT | Performed by: FAMILY MEDICINE

## 2024-04-23 PROCEDURE — 36415 COLL VENOUS BLD VENIPUNCTURE: CPT

## 2024-04-23 NOTE — PROGRESS NOTES
Name: Kaylee Ellington      : 1965      MRN: [id number]  Encounter Provider: Larissa Chadwick MD  Encounter Date: 2024   Encounter department: [facility]    Assessment & Plan     Assessment & Plan  1. Suspected mumps.  The patient's symptoms are indicative of mumps, which are indicative of multiple salivary gland enlargement along with viral symptoms. A blood test will be conducted today. The patient was educated about the potential risks of mumps, including fevers, chills, contagiousness, and long-lasting dry mouth. She was also advised to wear a mask during her blood work.    2. Hypertension.  The patient's blood pressure is well-managed with valsartan.  Watch salt intake.  Stay adequately hydrated.    3. Thyroid biopsy results.  The biopsy results were satisfactory, albeit not entirely normal. The patient was advised to maintain close monitoring of her thyroid condition.  Continue follow-up with endocrinology.    4.  Hyperthyroidism  Continue with the methimazole as per endocrinology.  Continue to follow-up with endocrinology regularly.  Upcoming laboratory testing discussed.    5.  Hyperlipidemia.  Slip given for upcoming laboratory testing.  Watch diet.  Increase fiber in diet.  She plans to have this completed within the next 1 to 2 weeks.    Screening testing discussed.  Recommend mammogram as well as bone density.  Recommended Cologuard for screening for colorectal cancer.  Will have her follow-up in about 4 to 6 months or sooner if needed depending on the results of the testing.     Orders Placed This Encounter   Procedures    Cologuard    Mammo screening bilateral w 3d & cad    DXA bone density spine hip and pelvis    CBC and differential    Comprehensive metabolic panel    Hemoglobin A1C    Lipid panel    TSH, 3rd generation    Mumps antibody, IgM       Subjective      History of Present Illness  The patient is a 58-year-old female who presents for routine follow-up,  but with some acute complaints.    The patient reported feeling well until 1 to 2 days ago when she developed bilateral facial swelling, with the left side being more affected than the right. She also noticed two palpable lumps.  Initially over the left parotid area and then the right and subsequently into the submandibular areas bilaterally.  Concurrently, she suspects an illness, characterized by a burning sensation in her eyes, voice changes, rhinorrhea, and a scratchy throat. She expressed concerns about potential symptoms of mumps. She reported an allergic reaction to MMR at the age of 6, which prevented her from walking for 6 months. Despite increasing her water intake, she occasionally struggles to finish a quarter of a Ralph cup. Currently, she is experiencing xerostomia.    The patient underwent a thyroid biopsy and was scheduled to consult with an ENT specialist on 01/02/2024. However, her appointment was rescheduled for 02/13/2024. Two months post-biopsy and blood work, on 02/12/2024, she was informed that Dr. Melo was unavailable. Upon returning to work, she received a voicemail stating that her pathology results were satisfactory. However, her blood work was abnormal, prompting a referral to endocrinology, which she has already scheduled. She commenced methimazole daily approximately 3 weeks ago. She is scheduled for blood work in the upcoming weekend.    Tolerating metformin without difficulty.  Denies any significant GI symptoms.  Tolerating the methimazole through endocrinology so far but has not been on it long.  Continues with the valsartan.  Denies any headaches or localized weakness.  Denies any chest pain or palpitations.     She had an allergic reaction to MMR.    Review of Systems   Constitutional:  Positive for chills and fatigue. Negative for activity change, appetite change and fever.   HENT:  Positive for facial swelling. Negative for congestion and rhinorrhea.    Eyes:  Negative for  "visual disturbance.   Respiratory:  Negative for chest tightness and shortness of breath.    Cardiovascular:  Negative for chest pain and palpitations.   Gastrointestinal:  Negative for abdominal pain, blood in stool, diarrhea, nausea and vomiting.   Endocrine: Negative for polydipsia, polyphagia and polyuria.   Genitourinary:  Negative for dysuria, frequency and urgency.   Musculoskeletal:  Positive for myalgias. Negative for gait problem.   Skin:  Negative for color change.   Neurological:  Negative for dizziness and headaches.   Hematological:  Does not bruise/bleed easily.   Psychiatric/Behavioral:  Negative for confusion and sleep disturbance. The patient is not nervous/anxious.          Objective     /82 (BP Location: Left arm, Patient Position: Sitting, Cuff Size: Large)   Pulse 104   Temp 97.8 °F (36.6 °C)   Ht 5' 5\" (1.651 m)   Wt 83.8 kg (184 lb 12.8 oz)   SpO2 100%   BMI 30.75 kg/m²     Physical Exam  Both parotid glands in the head and neck region are enlarged.  Physical Exam  Vitals and nursing note reviewed.   Constitutional:       General: She is not in acute distress.     Appearance: She is well-developed, well-groomed and overweight.   HENT:      Head: Normocephalic and atraumatic.      Mouth/Throat:      Pharynx: No pharyngeal swelling or posterior oropharyngeal erythema.      Comments: Slightly dry mouth  Eyes:      General:         Right eye: No discharge.         Left eye: No discharge.      Conjunctiva/sclera: Conjunctivae normal.      Pupils: Pupils are equal, round, and reactive to light.   Neck:      Comments: Significant enlargement of bilateral parotid areas as well as submandibular areas with tenderness  Cardiovascular:      Rate and Rhythm: Normal rate and regular rhythm.      Heart sounds: Normal heart sounds. No murmur heard.     No friction rub. No gallop.   Pulmonary:      Effort: No respiratory distress.      Breath sounds: No wheezing or rales.   Abdominal:      " General: Bowel sounds are normal. There is no distension.      Tenderness: There is no abdominal tenderness.   Lymphadenopathy:      Cervical: No cervical adenopathy.   Skin:     General: Skin is warm and dry.   Neurological:      Mental Status: She is alert and oriented to person, place, and time.   Psychiatric:         Mood and Affect: Mood and affect normal.         Speech: Speech normal.         Behavior: Behavior normal. Behavior is cooperative.         Cognition and Memory: Cognition and memory normal.     Below is the patient's most recent value for Albumin, ALT, AST, BUN, Calcium, Chloride, Cholesterol, CO2, Creatinine, GFR, Glucose, HDL, Hematocrit, Hemoglobin, Hemoglobin A1C, LDL, Magnesium, Phosphorus, Platelets, Potassium, PSA, Sodium, Triglycerides, and WBC.   Lab Results   Component Value Date    ALT 29 08/12/2023    AST 16 08/12/2023    BUN 16 08/12/2023    CALCIUM 9.7 08/12/2023     (H) 08/12/2023    CHOL 226 08/17/2015    CO2 28 08/12/2023    CREATININE 0.96 08/12/2023    HDL 64 08/12/2023    HCT 43.6 08/12/2023    HGB 14.3 08/12/2023    HGBA1C 5.7 (H) 08/12/2023     (H) 08/12/2023    K 4.2 08/12/2023    TRIG 54 08/12/2023    WBC 7.91 08/12/2023     Note: for a comprehensive list of the patient's lab results, access the Results Review activity.

## 2024-04-26 LAB — MUV IGM SER QL: <0.8 AU (ref 0–0.79)

## 2024-04-28 ENCOUNTER — APPOINTMENT (OUTPATIENT)
Dept: LAB | Facility: HOSPITAL | Age: 59
End: 2024-04-28
Payer: COMMERCIAL

## 2024-04-28 DIAGNOSIS — R73.02 IMPAIRED GLUCOSE TOLERANCE: ICD-10-CM

## 2024-04-28 DIAGNOSIS — E05.90 HYPERTHYROIDISM: ICD-10-CM

## 2024-04-28 DIAGNOSIS — I10 ESSENTIAL HYPERTENSION: ICD-10-CM

## 2024-04-28 DIAGNOSIS — E78.2 MIXED HYPERLIPIDEMIA: ICD-10-CM

## 2024-04-28 DIAGNOSIS — K11.1 ENLARGED SALIVARY GLAND: ICD-10-CM

## 2024-04-28 DIAGNOSIS — Z00.8 HEALTH EXAMINATION IN POPULATION SURVEY: ICD-10-CM

## 2024-04-28 LAB
ALBUMIN SERPL BCP-MCNC: 4.1 G/DL (ref 3.5–5)
ALP SERPL-CCNC: 79 U/L (ref 34–104)
ALT SERPL W P-5'-P-CCNC: 18 U/L (ref 7–52)
ANION GAP SERPL CALCULATED.3IONS-SCNC: 10 MMOL/L (ref 4–13)
AST SERPL W P-5'-P-CCNC: 14 U/L (ref 13–39)
BASOPHILS # BLD AUTO: 0.06 THOUSANDS/ÂΜL (ref 0–0.1)
BASOPHILS NFR BLD AUTO: 1 % (ref 0–1)
BILIRUB SERPL-MCNC: 0.58 MG/DL (ref 0.2–1)
BUN SERPL-MCNC: 19 MG/DL (ref 5–25)
CALCIUM SERPL-MCNC: 9.4 MG/DL (ref 8.4–10.2)
CHLORIDE SERPL-SCNC: 107 MMOL/L (ref 96–108)
CHOLEST SERPL-MCNC: 184 MG/DL
CHOLEST SERPL-MCNC: 187 MG/DL
CO2 SERPL-SCNC: 22 MMOL/L (ref 21–32)
CREAT SERPL-MCNC: 0.9 MG/DL (ref 0.6–1.3)
EOSINOPHIL # BLD AUTO: 0.3 THOUSAND/ÂΜL (ref 0–0.61)
EOSINOPHIL NFR BLD AUTO: 4 % (ref 0–6)
ERYTHROCYTE [DISTWIDTH] IN BLOOD BY AUTOMATED COUNT: 13.4 % (ref 11.6–15.1)
EST. AVERAGE GLUCOSE BLD GHB EST-MCNC: 114 MG/DL
EST. AVERAGE GLUCOSE BLD GHB EST-MCNC: 117 MG/DL
GFR SERPL CREATININE-BSD FRML MDRD: 71 ML/MIN/1.73SQ M
GLUCOSE P FAST SERPL-MCNC: 107 MG/DL (ref 65–99)
HBA1C MFR BLD: 5.6 %
HBA1C MFR BLD: 5.7 %
HCT VFR BLD AUTO: 41.1 % (ref 34.8–46.1)
HDLC SERPL-MCNC: 54 MG/DL
HDLC SERPL-MCNC: 55 MG/DL
HGB BLD-MCNC: 13.4 G/DL (ref 11.5–15.4)
IMM GRANULOCYTES # BLD AUTO: 0.01 THOUSAND/UL (ref 0–0.2)
IMM GRANULOCYTES NFR BLD AUTO: 0 % (ref 0–2)
LDLC SERPL CALC-MCNC: 117 MG/DL (ref 0–100)
LDLC SERPL CALC-MCNC: 119 MG/DL (ref 0–100)
LYMPHOCYTES # BLD AUTO: 1.34 THOUSANDS/ÂΜL (ref 0.6–4.47)
LYMPHOCYTES NFR BLD AUTO: 16 % (ref 14–44)
MCH RBC QN AUTO: 28.5 PG (ref 26.8–34.3)
MCHC RBC AUTO-ENTMCNC: 32.6 G/DL (ref 31.4–37.4)
MCV RBC AUTO: 87 FL (ref 82–98)
MONOCYTES # BLD AUTO: 0.51 THOUSAND/ÂΜL (ref 0.17–1.22)
MONOCYTES NFR BLD AUTO: 6 % (ref 4–12)
NEUTROPHILS # BLD AUTO: 5.99 THOUSANDS/ÂΜL (ref 1.85–7.62)
NEUTS SEG NFR BLD AUTO: 73 % (ref 43–75)
NONHDLC SERPL-MCNC: 130 MG/DL
NONHDLC SERPL-MCNC: 132 MG/DL
NRBC BLD AUTO-RTO: 0 /100 WBCS
PLATELET # BLD AUTO: 466 THOUSANDS/UL (ref 149–390)
PMV BLD AUTO: 9.7 FL (ref 8.9–12.7)
POTASSIUM SERPL-SCNC: 3.8 MMOL/L (ref 3.5–5.3)
PROT SERPL-MCNC: 7.7 G/DL (ref 6.4–8.4)
RBC # BLD AUTO: 4.7 MILLION/UL (ref 3.81–5.12)
SODIUM SERPL-SCNC: 139 MMOL/L (ref 135–147)
T4 FREE SERPL-MCNC: 1.16 NG/DL (ref 0.61–1.12)
TRIGL SERPL-MCNC: 64 MG/DL
TRIGL SERPL-MCNC: 64 MG/DL
TSH SERPL DL<=0.05 MIU/L-ACNC: 0.05 UIU/ML (ref 0.45–4.5)
WBC # BLD AUTO: 8.21 THOUSAND/UL (ref 4.31–10.16)

## 2024-04-28 PROCEDURE — 80053 COMPREHEN METABOLIC PANEL: CPT

## 2024-04-28 PROCEDURE — 83036 HEMOGLOBIN GLYCOSYLATED A1C: CPT

## 2024-04-28 PROCEDURE — 80061 LIPID PANEL: CPT

## 2024-04-28 PROCEDURE — 85025 COMPLETE CBC W/AUTO DIFF WBC: CPT

## 2024-04-28 PROCEDURE — 36415 COLL VENOUS BLD VENIPUNCTURE: CPT

## 2024-04-28 PROCEDURE — 84443 ASSAY THYROID STIM HORMONE: CPT

## 2024-04-28 PROCEDURE — 84439 ASSAY OF FREE THYROXINE: CPT

## 2024-04-29 ENCOUNTER — TELEPHONE (OUTPATIENT)
Age: 59
End: 2024-04-29

## 2024-04-29 NOTE — TELEPHONE ENCOUNTER
Patient called for her lab results. I read her Dr. Reinoso message and she advised that she will increase her methimazole to 10 mg daily. No further actions needed.

## 2024-04-30 NOTE — TELEPHONE ENCOUNTER
Patient returning Val's call. I spoke with her yesterday regarding her labs and medication change. No further actions needed.

## 2024-05-07 ENCOUNTER — OFFICE VISIT (OUTPATIENT)
Dept: ENDOCRINOLOGY | Facility: CLINIC | Age: 59
End: 2024-05-07
Payer: COMMERCIAL

## 2024-05-07 VITALS
DIASTOLIC BLOOD PRESSURE: 88 MMHG | OXYGEN SATURATION: 99 % | WEIGHT: 182 LBS | HEIGHT: 65 IN | BODY MASS INDEX: 30.32 KG/M2 | SYSTOLIC BLOOD PRESSURE: 140 MMHG | HEART RATE: 94 BPM

## 2024-05-07 DIAGNOSIS — E05.90 HYPERTHYROIDISM: Primary | ICD-10-CM

## 2024-05-07 DIAGNOSIS — E04.2 MULTIPLE THYROID NODULES: ICD-10-CM

## 2024-05-07 PROCEDURE — 99214 OFFICE O/P EST MOD 30 MIN: CPT | Performed by: STUDENT IN AN ORGANIZED HEALTH CARE EDUCATION/TRAINING PROGRAM

## 2024-05-07 RX ORDER — METHIMAZOLE 10 MG/1
10 TABLET ORAL DAILY
Qty: 90 TABLET | Refills: 1 | Status: SHIPPED | OUTPATIENT
Start: 2024-05-07 | End: 2024-05-07

## 2024-05-07 RX ORDER — METHIMAZOLE 10 MG/1
10 TABLET ORAL DAILY
Qty: 90 TABLET | Refills: 1 | Status: SHIPPED | OUTPATIENT
Start: 2024-05-07

## 2024-05-07 NOTE — PROGRESS NOTES
Kaylee Ellington 58 y.o. female MRN: [id number]    Encounter: [id number]      Assessment/Plan     Problem List Items Addressed This Visit     Hyperthyroidism - Primary     Clinically improving. Will continue methimazole 10 mg daily dosing, which was recently increased due to high t4 and low TSH. Thyroid uptake scan reveals cold nodule, which was fna benign by afirma. The uptake pattern may be more consistent with graves'. I will check TSI with next labs. Pathophysiology of graves' reviewed, including its extra-systemic manifestations, including NIK. I will plan to contact Kaylee with results of labs in 6-weeks, and with follow up recommendations.          Relevant Medications    methimazole (TAPAZOLE) 10 mg tablet    Other Relevant Orders    Thyroid stimulating immunoglobulin    TSH, 3rd generation    T4, free    T3, free    Multiple thyroid nodules     We will arrange for ultrasound monitoring of thyroid nodules         Relevant Medications    methimazole (TAPAZOLE) 10 mg tablet     RTC 6-months    CC: thyroid nodules    History of Present Illness     HPI:    Kaylee returns today in follow up of hyperthyroidism and thyroid nodules.     Kaylee is doing well. She reports improvements in symptoms of palpitations and poor sleep since starting methimazole. She does report having a recent episode of enlarged, tender salivary glands, possibly concerning for mumps. She did have labs completed shortly in that course, and her TSH was worse. She denies any hyperthyroid symptoms at present. She is taking and tolerating methimazole 5 mg daily.     Review of Systems   Constitutional:  Negative for diaphoresis and unexpected weight change.   Cardiovascular:  Negative for palpitations.   Endocrine: Negative for cold intolerance and heat intolerance.   Neurological:  Negative for tremors.   Psychiatric/Behavioral:  The patient is not nervous/anxious.    All other systems reviewed and are negative.      Historical Information   Past  "Medical History:   Diagnosis Date   • Allergic    • Anxiety      Past Surgical History:   Procedure Laterality Date   • NO PAST SURGERIES     • US GUIDED THYROID BIOPSY  12/18/2023     Social History   Social History     Substance and Sexual Activity   Alcohol Use No     Social History     Substance and Sexual Activity   Drug Use No     Social History     Tobacco Use   Smoking Status Never   Smokeless Tobacco Never     Family History:   Family History   Problem Relation Age of Onset   • Heart disease Mother    • Other Father        Meds/Allergies   Current Outpatient Medications   Medication Sig Dispense Refill   • fexofenadine (ALLEGRA) 180 MG tablet Take 1 tablet (180 mg total) by mouth daily 90 tablet 3   • fluticasone (FLONASE) 50 mcg/act nasal spray 1 spray into each nostril daily 16 g 5   • metFORMIN (GLUCOPHAGE) 500 mg tablet Take 2 tablets (1,000 mg total) by mouth 2 (two) times a day with meals 360 tablet 3   • methimazole (TAPAZOLE) 10 mg tablet Take 1 tablet (10 mg total) by mouth daily 90 tablet 1   • valsartan (DIOVAN) 160 mg tablet Take 1 tablet (160 mg total) by mouth daily 90 tablet 0   • LORazepam (ATIVAN) 0.5 mg tablet Take 1 tablet (0.5 mg total) by mouth every 8 (eight) hours as needed for anxiety (Patient not taking: Reported on 4/23/2024) 60 tablet 1   • metFORMIN (GLUCOPHAGE) 500 mg tablet TAKE TWO TABLETSBY MOUTH 2 TIMES A DAY WITH MEALS (Patient not taking: Reported on 5/7/2024) 360 tablet 3     No current facility-administered medications for this visit.     Allergies   Allergen Reactions   • Measles Mumps And Rubella Virus Vaccine Live Arthralgia     Knee swelling       Objective   Vitals: Blood pressure 140/88, pulse 94, height 5' 5\" (1.651 m), weight 82.6 kg (182 lb), SpO2 99%.    Physical Exam  Vitals reviewed.   Constitutional:       Appearance: Normal appearance.   HENT:      Head: Normocephalic and atraumatic.      Nose: Nose normal.   Eyes:      General: No scleral icterus.     " "Conjunctiva/sclera: Conjunctivae normal.   Pulmonary:      Effort: Pulmonary effort is normal. No respiratory distress.   Musculoskeletal:         General: No deformity.   Neurological:      General: No focal deficit present.      Mental Status: She is alert.   Psychiatric:         Mood and Affect: Mood normal.         Behavior: Behavior normal.         The history was obtained from the review of the chart, patient.    Lab Results:   Lab Results   Component Value Date/Time    TSH 3RD GENERATON 0.051 (L) 04/28/2024 09:22 AM    TSH 3RD GENERATON 0.172 (L) 12/18/2023 08:53 AM    TSH 3RD GENERATON 0.198 (L) 08/12/2023 10:33 AM    Free T4 1.16 (H) 04/28/2024 09:22 AM    Free T4 1.27 (H) 12/18/2023 08:53 AM     1.11.24      Imaging Studies:   Results for orders placed during the hospital encounter of 09/07/23    US thyroid    Impression  Enlarged gland with multiple nodules.  The following meet current ACR criteria for recommending ultrasound guided biopsy:    TR 3 left isthmus nodule in image 31 measuring 2.6 x 2.3 x 2.5 cm.    Reference: ACR Thyroid Imaging, Reporting and Data System (TI-RADS): White Paper of the ACR TI-RADS Committee. J AM Ines Radiol 2017;14:587-595. (additional recommendations based on American Thyroid Association 2015 guidelines.)      Workstation performed: [id number]    3.26.24 thyroid uptake      I have personally reviewed pertinent reports.   and I have personally reviewed pertinent films in PACS. Thyroid uptake reveals cold nodule in left lower lobe, which was FNA benign by afirma. There otherwise appears to be homogeneity with uptake throughout the gland. Consider graves    Portions of the record may have been created with voice recognition software. Occasional wrong word or \"sound a like\" substitutions may have occurred due to the inherent limitations of voice recognition software. Read the chart carefully and recognize, using context, where substitutions have occurred.    "

## 2024-05-07 NOTE — ASSESSMENT & PLAN NOTE
Clinically improving. Will continue methimazole 10 mg daily dosing, which was recently increased due to high t4 and low TSH. Thyroid uptake scan reveals cold nodule, which was fna benign by afirma. The uptake pattern may be more consistent with graves'. I will check TSI with next labs. Pathophysiology of graves' reviewed, including its extra-systemic manifestations, including NIK. I will plan to contact Kaylee with results of labs in 6-weeks, and with follow up recommendations.

## 2024-06-05 ENCOUNTER — TELEPHONE (OUTPATIENT)
Age: 59
End: 2024-06-05

## 2024-06-05 NOTE — TELEPHONE ENCOUNTER
Patients  said patient dropped off [facility] paperwork for Dr. Chadwick to fill out and needed them faxed by 6/6/24 and was told the patient would get a phone call to  a copy after the fax was sent. Please adv if paperwork is ready for . Can Call [place] () at [phone]

## 2024-07-01 DIAGNOSIS — I10 ESSENTIAL HYPERTENSION: ICD-10-CM

## 2024-07-02 RX ORDER — VALSARTAN 160 MG/1
160 TABLET ORAL DAILY
Qty: 100 TABLET | Refills: 1 | Status: SHIPPED | OUTPATIENT
Start: 2024-07-02

## 2024-07-06 ENCOUNTER — APPOINTMENT (OUTPATIENT)
Dept: LAB | Facility: MEDICAL CENTER | Age: 59
End: 2024-07-06
Payer: COMMERCIAL

## 2024-07-06 DIAGNOSIS — E05.90 HYPERTHYROIDISM: ICD-10-CM

## 2024-07-06 LAB
T3FREE SERPL-MCNC: 3.74 PG/ML (ref 2.5–3.9)
T4 FREE SERPL-MCNC: 0.8 NG/DL (ref 0.61–1.12)
TSH SERPL DL<=0.05 MIU/L-ACNC: 0.75 UIU/ML (ref 0.45–4.5)

## 2024-07-06 PROCEDURE — 84443 ASSAY THYROID STIM HORMONE: CPT

## 2024-07-06 PROCEDURE — 36415 COLL VENOUS BLD VENIPUNCTURE: CPT

## 2024-07-06 PROCEDURE — 84439 ASSAY OF FREE THYROXINE: CPT

## 2024-07-06 PROCEDURE — 84445 ASSAY OF TSI GLOBULIN: CPT

## 2024-07-06 PROCEDURE — 84481 FREE ASSAY (FT-3): CPT

## 2024-07-09 DIAGNOSIS — E05.90 HYPERTHYROIDISM: Primary | ICD-10-CM

## 2024-07-09 LAB — TSI SER-ACNC: <0.1 IU/L (ref 0–0.55)

## 2024-10-31 ENCOUNTER — APPOINTMENT (OUTPATIENT)
Dept: LAB | Facility: HOSPITAL | Age: 59
End: 2024-10-31
Payer: COMMERCIAL

## 2024-10-31 DIAGNOSIS — E05.90 HYPERTHYROIDISM: ICD-10-CM

## 2024-10-31 LAB
T4 FREE SERPL-MCNC: 0.73 NG/DL (ref 0.61–1.12)
TSH SERPL DL<=0.05 MIU/L-ACNC: 2.68 UIU/ML (ref 0.45–4.5)

## 2024-10-31 PROCEDURE — 83520 IMMUNOASSAY QUANT NOS NONAB: CPT

## 2024-10-31 PROCEDURE — 84443 ASSAY THYROID STIM HORMONE: CPT

## 2024-10-31 PROCEDURE — 84439 ASSAY OF FREE THYROXINE: CPT

## 2024-10-31 PROCEDURE — 36415 COLL VENOUS BLD VENIPUNCTURE: CPT

## 2024-11-02 LAB — TSH RECEP AB SER-ACNC: <1.1 IU/L (ref 0–1.75)

## 2024-11-07 ENCOUNTER — OFFICE VISIT (OUTPATIENT)
Dept: ENDOCRINOLOGY | Facility: CLINIC | Age: 59
End: 2024-11-07
Payer: COMMERCIAL

## 2024-11-07 VITALS
HEART RATE: 92 BPM | SYSTOLIC BLOOD PRESSURE: 144 MMHG | BODY MASS INDEX: 29.79 KG/M2 | HEIGHT: 65 IN | TEMPERATURE: 97.1 F | WEIGHT: 178.8 LBS | DIASTOLIC BLOOD PRESSURE: 88 MMHG

## 2024-11-07 DIAGNOSIS — E05.90 HYPERTHYROIDISM: Primary | ICD-10-CM

## 2024-11-07 DIAGNOSIS — E04.2 MULTIPLE THYROID NODULES: ICD-10-CM

## 2024-11-07 PROCEDURE — 99214 OFFICE O/P EST MOD 30 MIN: CPT | Performed by: STUDENT IN AN ORGANIZED HEALTH CARE EDUCATION/TRAINING PROGRAM

## 2024-11-07 RX ORDER — METHIMAZOLE 10 MG/1
10 TABLET ORAL DAILY
Qty: 90 TABLET | Refills: 3 | Status: SHIPPED | OUTPATIENT
Start: 2024-11-07

## 2024-11-07 NOTE — ASSESSMENT & PLAN NOTE
Biochemically euthyroid on methimazole. Graves' Ab negative hyperthyroidism. This may be seronegative Graves', possible TSH receptor activating mutation. Discussed ATD, RAMOS, thyroidectomy. Cw current plan. Labs in 3- then 6-mo.   Orders:    TSH, 3rd generation; Standing    T4, free; Standing

## 2024-11-07 NOTE — PROGRESS NOTES
"Ambulatory Visit  Name: Kaylee Ellington      : 1965      MRN: [id number]  Encounter Provider: Nicolás Larams, DO  Encounter Date: 2024   Encounter department: [facility]    Assessment & Plan  Hyperthyroidism  Biochemically euthyroid on methimazole. Graves' Ab negative hyperthyroidism. This may be seronegative Graves', possible TSH receptor activating mutation. Discussed ATD, RAMOS, thyroidectomy. Cw current plan. Labs in 3- then 6-mo.   Orders:    TSH, 3rd generation; Standing    T4, free; Standing    Multiple thyroid nodules  Multiple thyroid nodules. 2 left sided nodules (TR3 and TR2) both FNA/Afirma benign last year. One nodule >5 cm. We will update thyroid US        RTC 6-mo    History of Present Illness     Kaylee Ellington is a 59 y.o. female who presents in follow up of hyperthyroidism and thyroid nodules. She has Ab negative Graves'. She is on methimazole 10 mg daily. She is feeling better on therapy with less anxiety, palpations, and insomnia. She denies compressive neck complaints.       Review of Systems   Constitutional:  Negative for unexpected weight change.   Cardiovascular:  Negative for palpitations.   Neurological:  Negative for tremors.   All other systems reviewed and are negative.          Objective     /88   Pulse 92   Temp (!) 97.1 °F (36.2 °C)   Ht 5' 5\" (1.651 m)   Wt 81.1 kg (178 lb 12.8 oz)   BMI 29.75 kg/m²     Physical Exam  Vitals reviewed.   Constitutional:       General: She is not in acute distress.     Appearance: Normal appearance.   HENT:      Head: Normocephalic and atraumatic.   Eyes:      General: No scleral icterus.     Conjunctiva/sclera: Conjunctivae normal.   Neck:      Comments: Nodular goiter  Pulmonary:      Effort: Pulmonary effort is normal. No respiratory distress.   Musculoskeletal:         General: No deformity.      Cervical back: Normal range of motion.   Neurological:      General: No focal deficit " present.      Mental Status: She is alert.   Psychiatric:         Mood and Affect: Mood normal.         Behavior: Behavior normal.         Component      Latest Ref Rng 7/6/2024 10/31/2024   THYROID STIMULATING IMMUNOGLOBULIN      0.00 - 0.55 IU/L <0.10     TSH 3RD GENERATON      0.450 - 4.500 uIU/mL 0.752  2.676    FREE T4      0.61 - 1.12 ng/dL 0.80  0.73    FREE T3      2.50 - 3.90 pg/mL 3.74     Thyrotropin Receptor Ab      0.00 - 1.75 IU/L  <1.10      3.26.24    THYROID SCAN AND UPTAKE     INDICATION:  E04.2: Nontoxic multinodular goiter  E05.90: Thyrotoxicosis, unspecified without thyrotoxic crisis or storm.     COMPARISON: Thyroid ultrasound 9/7/2023     TECHNIQUE:  The study was performed following the oral administration of 259 uCi I-123.     FINDINGS:     Photopenic defect noted in the left lower pole region laterally. This would correspond to a previously seen benign-appearing nodule on ultrasound measuring up to 5.1 cm. Correlate with prior tissue sampling results. No focal increased uptake to suggest a   hyperfunctioning nodule.        Thyroid uptake values are:     6 hours:   19.3 % (N =  5 -15%)     24 hours: 31.5% (N =15 - 35%)     The 6-hour uptake is slightly elevated but this normalizes at 24 hours.     IMPRESSION:     1. The 6-hour uptake is slightly elevated but this normalizes at 24 hours. The findings would suggest a hyperthyroid gland with rapid iodine turnover.  2. No findings for hyperfunctioning nodule.  3. Focal photopenia at the left lower pole corresponding to a previously seen 5.1 cm nodule. Correlate with prior tissue sampling results.

## 2024-11-07 NOTE — ASSESSMENT & PLAN NOTE
Multiple thyroid nodules. 2 left sided nodules (TR3 and TR2) both FNA/Afirma benign last year. One nodule >5 cm. We will update thyroid US

## 2024-11-07 NOTE — PATIENT INSTRUCTIONS
Continue current treatment    Schedule thyroid US    Labs in 3-months. I will contact you with those results    Return to clinic in 6-months

## 2024-11-26 ENCOUNTER — OFFICE VISIT (OUTPATIENT)
Dept: INTERNAL MEDICINE CLINIC | Facility: CLINIC | Age: 59
End: 2024-11-26
Payer: COMMERCIAL

## 2024-11-26 VITALS
BODY MASS INDEX: 29.79 KG/M2 | WEIGHT: 178.8 LBS | HEART RATE: 113 BPM | SYSTOLIC BLOOD PRESSURE: 192 MMHG | TEMPERATURE: 98.3 F | DIASTOLIC BLOOD PRESSURE: 104 MMHG | HEIGHT: 65 IN | OXYGEN SATURATION: 98 %

## 2024-11-26 DIAGNOSIS — E05.90 HYPERTHYROIDISM: ICD-10-CM

## 2024-11-26 DIAGNOSIS — E78.2 MIXED HYPERLIPIDEMIA: ICD-10-CM

## 2024-11-26 DIAGNOSIS — F41.9 ANXIETY: ICD-10-CM

## 2024-11-26 DIAGNOSIS — I10 ESSENTIAL HYPERTENSION: Primary | ICD-10-CM

## 2024-11-26 DIAGNOSIS — R73.02 IMPAIRED GLUCOSE TOLERANCE: ICD-10-CM

## 2024-11-26 PROBLEM — R79.89 LOW TSH LEVEL: Status: RESOLVED | Noted: 2019-07-30 | Resolved: 2024-11-26

## 2024-11-26 PROCEDURE — 99214 OFFICE O/P EST MOD 30 MIN: CPT | Performed by: FAMILY MEDICINE

## 2024-11-26 RX ORDER — LORAZEPAM 0.5 MG/1
0.5 TABLET ORAL EVERY 8 HOURS PRN
Qty: 60 TABLET | Refills: 1 | Status: SHIPPED | OUTPATIENT
Start: 2024-11-26

## 2024-11-26 RX ORDER — VALSARTAN 320 MG/1
320 TABLET ORAL DAILY
Qty: 100 TABLET | Refills: 1 | Status: SHIPPED | OUTPATIENT
Start: 2024-11-26

## 2024-11-26 RX ORDER — ESCITALOPRAM OXALATE 5 MG/1
5 TABLET ORAL DAILY
Qty: 30 TABLET | Refills: 5 | Status: SHIPPED | OUTPATIENT
Start: 2024-11-26

## 2024-11-26 NOTE — ASSESSMENT & PLAN NOTE
Blood pressure remains significantly elevated.  Will increase the dose of her valsartan.  Continue to follow blood pressure at home.  Stay adequately hydrated.  Watch salt intake.  Will have her follow-up in about 3 to 4 weeks for blood pressure check.  Orders:    valsartan (DIOVAN) 320 MG tablet; Take 1 tablet (320 mg total) by mouth daily    CBC and differential; Future    Comprehensive metabolic panel; Future

## 2024-11-26 NOTE — PROGRESS NOTES
Name: Kaylee Ellington      : 1965      MRN: [id number]  Encounter Provider: Larissa Chadwick MD  Encounter Date: 2024   Encounter department: [facility]  :  Assessment & Plan  Essential hypertension  Blood pressure remains significantly elevated.  Will increase the dose of her valsartan.  Continue to follow blood pressure at home.  Stay adequately hydrated.  Watch salt intake.  Will have her follow-up in about 3 to 4 weeks for blood pressure check.  Orders:    valsartan (DIOVAN) 320 MG tablet; Take 1 tablet (320 mg total) by mouth daily    CBC and differential; Future    Comprehensive metabolic panel; Future    Anxiety  Anxiety symptoms discussed.  This is significantly limiting her life at present and likely raising her blood pressure as well.  Discussed [place] paperwork with her.  Will complete this in the near future.  Discussed options with her.  Will start Lexapro and gradually titrate dose if needed.  Take lorazepam if needed.  PDMP reviewed.  Orders:    LORazepam (ATIVAN) 0.5 mg tablet; Take 1 tablet (0.5 mg total) by mouth every 8 (eight) hours as needed for anxiety    escitalopram (LEXAPRO) 5 mg tablet; Take 1 tablet (5 mg total) by mouth daily    CBC and differential; Future    Mixed hyperlipidemia  Watch diet.  Try to increase fiber in diet.  Try to increase activity level.  Slip given for follow-up laboratory testing.  Orders:    CBC and differential; Future    Comprehensive metabolic panel; Future    Lipid panel; Future    TSH, 3rd generation; Future    Hyperthyroidism  Continue to follow-up with endocrinology.  Continue with the methimazole as per their direction.  Orders:    CBC and differential; Future    Impaired glucose tolerance  Continue with the metformin.  Watch diet.  Try to increase activity level.  Orders:    CBC and differential; Future    Hemoglobin A1C; Future    Orders and recommendations as noted above.  Stressors discussed.  Methods for stress  relief discussed.  Up-to-date on flu shot.  Discussed with her getting mammogram and bone density completed.  Will have her follow-up in about 3 to 4 weeks or sooner if needed.      Depression Screening and Follow-up Plan: Patient was screened for depression during today's encounter. They screened negative with a PHQ-9 score of 0.      History of Present Illness     She presents for follow-up.  Has been under a lot of stress with family issues as well as work-related issues.  She feels very anxious and has difficulty concentrating.  Sleep has been disrupted because of this.  Has difficulty concentrating and completing tasks at times.  Cries often.  Does not feel that she can even complete her normal activities at times.  She has been following her blood pressure at home and states that it has been relatively well-controlled.  Denies any headaches or localized weakness.  Denies any chest pain or palpitations.  Tolerating the metformin without difficulty.  Denies any significant GI side effects.  Appetite has been variable.  Sleep has been disrupted because of anxiety symptoms.  Denies any nausea or vomiting.  Tolerating her valsartan without difficulty.  Denies any significant headaches or localized weakness.      Review of Systems   Constitutional:  Positive for activity change. Negative for appetite change, chills and fever.   HENT:  Negative for congestion and rhinorrhea.    Eyes:  Negative for visual disturbance.   Respiratory:  Negative for chest tightness and shortness of breath.    Cardiovascular:  Negative for chest pain and palpitations.   Gastrointestinal:  Negative for abdominal pain, blood in stool, diarrhea, nausea and vomiting.   Endocrine: Negative for polydipsia, polyphagia and polyuria.   Genitourinary:  Negative for dysuria, frequency and urgency.   Musculoskeletal:  Negative for gait problem.   Skin:  Negative for color change.   Neurological:  Negative for dizziness and headaches.   Hematological:   Does not bruise/bleed easily.   Psychiatric/Behavioral:  Positive for dysphoric mood and sleep disturbance. Negative for confusion. The patient is nervous/anxious.      Medical History Reviewed by provider this encounter:     .  Past Medical History   Past Medical History:   Diagnosis Date    Allergic     Anxiety      Past Surgical History:   Procedure Laterality Date    NO PAST SURGERIES      US GUIDED THYROID BIOPSY  12/18/2023     Family History   Problem Relation Age of Onset    Heart disease Mother     Other Father       reports that she has never smoked. She has never used smokeless tobacco. She reports that she does not drink alcohol and does not use drugs.  Current Outpatient Medications on File Prior to Visit   Medication Sig Dispense Refill    fexofenadine (ALLEGRA) 180 MG tablet Take 1 tablet (180 mg total) by mouth daily 90 tablet 3    fluticasone (FLONASE) 50 mcg/act nasal spray 1 spray into each nostril daily 16 g 5    metFORMIN (GLUCOPHAGE) 500 mg tablet Take 2 tablets (1,000 mg total) by mouth 2 (two) times a day with meals 360 tablet 3    methimazole (TAPAZOLE) 10 mg tablet Take 1 tablet (10 mg total) by mouth daily 90 tablet 3     No current facility-administered medications on file prior to visit.     Allergies   Allergen Reactions    Measles Mumps And Rubella Virus Vaccine Live Arthralgia     Knee swelling      Current Outpatient Medications on File Prior to Visit   Medication Sig Dispense Refill    fexofenadine (ALLEGRA) 180 MG tablet Take 1 tablet (180 mg total) by mouth daily 90 tablet 3    fluticasone (FLONASE) 50 mcg/act nasal spray 1 spray into each nostril daily 16 g 5    metFORMIN (GLUCOPHAGE) 500 mg tablet Take 2 tablets (1,000 mg total) by mouth 2 (two) times a day with meals 360 tablet 3    methimazole (TAPAZOLE) 10 mg tablet Take 1 tablet (10 mg total) by mouth daily 90 tablet 3     No current facility-administered medications on file prior to visit.      Social History     Tobacco  "Use    Smoking status: Never    Smokeless tobacco: Never   Vaping Use    Vaping status: Never Used   Substance and Sexual Activity    Alcohol use: No    Drug use: No    Sexual activity: Not on file        Objective   BP (!) 192/104 (BP Location: Left arm, Patient Position: Sitting, Cuff Size: Large)   Pulse (!) 113   Temp 98.3 °F (36.8 °C)   Ht 5' 5\" (1.651 m)   Wt 81.1 kg (178 lb 12.8 oz)   SpO2 98%   BMI 29.75 kg/m²      Physical Exam  Vitals and nursing note reviewed.   Constitutional:       General: She is not in acute distress.     Appearance: She is well-developed, well-groomed and overweight.   HENT:      Head: Normocephalic and atraumatic.   Eyes:      General:         Right eye: No discharge.         Left eye: No discharge.      Conjunctiva/sclera: Conjunctivae normal.      Pupils: Pupils are equal, round, and reactive to light.   Cardiovascular:      Rate and Rhythm: Normal rate and regular rhythm.      Heart sounds: Normal heart sounds. No murmur heard.     No friction rub. No gallop.   Pulmonary:      Effort: No respiratory distress.      Breath sounds: No wheezing or rales.   Abdominal:      General: Bowel sounds are normal. There is no distension.      Tenderness: There is no abdominal tenderness.   Lymphadenopathy:      Cervical: No cervical adenopathy.   Skin:     General: Skin is warm and dry.   Neurological:      Mental Status: She is alert and oriented to person, place, and time.   Psychiatric:         Mood and Affect: Mood is anxious. Affect is tearful.         Speech: Speech normal.         Behavior: Behavior normal. Behavior is cooperative.         Cognition and Memory: Cognition and memory normal.         "

## 2024-11-26 NOTE — ASSESSMENT & PLAN NOTE
Watch diet.  Try to increase fiber in diet.  Try to increase activity level.  Slip given for follow-up laboratory testing.  Orders:    CBC and differential; Future    Comprehensive metabolic panel; Future    Lipid panel; Future    TSH, 3rd generation; Future

## 2024-11-26 NOTE — ASSESSMENT & PLAN NOTE
Anxiety symptoms discussed.  This is significantly limiting her life at present and likely raising her blood pressure as well.  Discussed FMLA paperwork with her.  Will complete this in the near future.  Discussed options with her.  Will start Lexapro and gradually titrate dose if needed.  Take lorazepam if needed.  PDMP reviewed.  Orders:    LORazepam (ATIVAN) 0.5 mg tablet; Take 1 tablet (0.5 mg total) by mouth every 8 (eight) hours as needed for anxiety    escitalopram (LEXAPRO) 5 mg tablet; Take 1 tablet (5 mg total) by mouth daily    CBC and differential; Future

## 2024-11-26 NOTE — ASSESSMENT & PLAN NOTE
Continue to follow-up with endocrinology.  Continue with the methimazole as per their direction.  Orders:    CBC and differential; Future

## 2024-11-26 NOTE — ASSESSMENT & PLAN NOTE
Continue with the metformin.  Watch diet.  Try to increase activity level.  Orders:    CBC and differential; Future    Hemoglobin A1C; Future

## 2024-11-27 ENCOUNTER — TELEPHONE (OUTPATIENT)
Age: 59
End: 2024-11-27

## 2024-11-27 NOTE — TELEPHONE ENCOUNTER
Pt called stating she had an appointment yesterday and bp meds were increased. Pt called to confirm the new dose and if a new prescription has been sent to the pharmacy.    Pt aware the dose was increased from 160 mg of Valsartan to 320 mg Valsartan.  Also, aware a new prescription was sent to [facility] pharmacy.  Pt states she does have enough of the 160 mg to double up until new prescription is received.

## 2024-12-02 ENCOUNTER — APPOINTMENT (OUTPATIENT)
Dept: LAB | Facility: HOSPITAL | Age: 59
End: 2024-12-02
Payer: COMMERCIAL

## 2024-12-02 ENCOUNTER — HOSPITAL ENCOUNTER (OUTPATIENT)
Dept: ULTRASOUND IMAGING | Facility: HOSPITAL | Age: 59
Discharge: HOME | End: 2024-12-02
Attending: STUDENT IN AN ORGANIZED HEALTH CARE EDUCATION/TRAINING PROGRAM
Payer: COMMERCIAL

## 2024-12-02 DIAGNOSIS — F41.9 ANXIETY: ICD-10-CM

## 2024-12-02 DIAGNOSIS — R73.02 IMPAIRED GLUCOSE TOLERANCE: ICD-10-CM

## 2024-12-02 DIAGNOSIS — E78.2 MIXED HYPERLIPIDEMIA: ICD-10-CM

## 2024-12-02 DIAGNOSIS — I10 ESSENTIAL HYPERTENSION: ICD-10-CM

## 2024-12-02 DIAGNOSIS — E05.90 HYPERTHYROIDISM: ICD-10-CM

## 2024-12-02 LAB
ALBUMIN SERPL BCG-MCNC: 4.3 G/DL (ref 3.5–5)
ALP SERPL-CCNC: 86 U/L (ref 34–104)
ALT SERPL W P-5'-P-CCNC: 12 U/L (ref 7–52)
ANION GAP SERPL CALCULATED.3IONS-SCNC: 8 MMOL/L (ref 4–13)
AST SERPL W P-5'-P-CCNC: 11 U/L (ref 13–39)
BASOPHILS # BLD AUTO: 0.06 THOUSANDS/ΜL (ref 0–0.1)
BASOPHILS NFR BLD AUTO: 1 % (ref 0–1)
BILIRUB SERPL-MCNC: 0.74 MG/DL (ref 0.2–1)
BUN SERPL-MCNC: 10 MG/DL (ref 5–25)
CALCIUM SERPL-MCNC: 9.3 MG/DL (ref 8.4–10.2)
CHLORIDE SERPL-SCNC: 103 MMOL/L (ref 96–108)
CHOLEST SERPL-MCNC: 223 MG/DL (ref ?–200)
CO2 SERPL-SCNC: 27 MMOL/L (ref 21–32)
CREAT SERPL-MCNC: 0.91 MG/DL (ref 0.6–1.3)
EOSINOPHIL # BLD AUTO: 0.2 THOUSAND/ΜL (ref 0–0.61)
EOSINOPHIL NFR BLD AUTO: 3 % (ref 0–6)
ERYTHROCYTE [DISTWIDTH] IN BLOOD BY AUTOMATED COUNT: 13 % (ref 11.6–15.1)
EST. AVERAGE GLUCOSE BLD GHB EST-MCNC: 114 MG/DL
GFR SERPL CREATININE-BSD FRML MDRD: 69 ML/MIN/1.73SQ M
GLUCOSE P FAST SERPL-MCNC: 105 MG/DL (ref 65–99)
HBA1C MFR BLD: 5.6 %
HCT VFR BLD AUTO: 43.5 % (ref 34.8–46.1)
HDLC SERPL-MCNC: 65 MG/DL
HGB BLD-MCNC: 14.4 G/DL (ref 11.5–15.4)
IMM GRANULOCYTES # BLD AUTO: 0.02 THOUSAND/UL (ref 0–0.2)
IMM GRANULOCYTES NFR BLD AUTO: 0 % (ref 0–2)
LDLC SERPL CALC-MCNC: 138 MG/DL (ref 0–100)
LYMPHOCYTES # BLD AUTO: 1.47 THOUSANDS/ΜL (ref 0.6–4.47)
LYMPHOCYTES NFR BLD AUTO: 21 % (ref 14–44)
MCH RBC QN AUTO: 29 PG (ref 26.8–34.3)
MCHC RBC AUTO-ENTMCNC: 33.1 G/DL (ref 31.4–37.4)
MCV RBC AUTO: 88 FL (ref 82–98)
MONOCYTES # BLD AUTO: 0.45 THOUSAND/ΜL (ref 0.17–1.22)
MONOCYTES NFR BLD AUTO: 7 % (ref 4–12)
NEUTROPHILS # BLD AUTO: 4.68 THOUSANDS/ΜL (ref 1.85–7.62)
NEUTS SEG NFR BLD AUTO: 68 % (ref 43–75)
NONHDLC SERPL-MCNC: 158 MG/DL
NRBC BLD AUTO-RTO: 0 /100 WBCS
PLATELET # BLD AUTO: 478 THOUSANDS/UL (ref 149–390)
PMV BLD AUTO: 9.4 FL (ref 8.9–12.7)
POTASSIUM SERPL-SCNC: 3.8 MMOL/L (ref 3.5–5.3)
PROT SERPL-MCNC: 7.5 G/DL (ref 6.4–8.4)
RBC # BLD AUTO: 4.96 MILLION/UL (ref 3.81–5.12)
SODIUM SERPL-SCNC: 138 MMOL/L (ref 135–147)
TRIGL SERPL-MCNC: 99 MG/DL (ref ?–150)
TSH SERPL DL<=0.05 MIU/L-ACNC: 3.3 UIU/ML (ref 0.45–4.5)
WBC # BLD AUTO: 6.88 THOUSAND/UL (ref 4.31–10.16)

## 2024-12-02 PROCEDURE — 80053 COMPREHEN METABOLIC PANEL: CPT

## 2024-12-02 PROCEDURE — 84443 ASSAY THYROID STIM HORMONE: CPT

## 2024-12-02 PROCEDURE — 83036 HEMOGLOBIN GLYCOSYLATED A1C: CPT

## 2024-12-02 PROCEDURE — 85025 COMPLETE CBC W/AUTO DIFF WBC: CPT

## 2024-12-02 PROCEDURE — 36415 COLL VENOUS BLD VENIPUNCTURE: CPT

## 2024-12-02 PROCEDURE — 76536 US EXAM OF HEAD AND NECK: CPT

## 2024-12-02 PROCEDURE — 80061 LIPID PANEL: CPT

## 2024-12-05 ENCOUNTER — RESULTS FOLLOW-UP (OUTPATIENT)
Dept: ENDOCRINOLOGY | Facility: CLINIC | Age: 59
End: 2024-12-05

## 2024-12-05 DIAGNOSIS — E04.2 MULTIPLE THYROID NODULES: Primary | ICD-10-CM

## 2024-12-06 ENCOUNTER — RESULTS FOLLOW-UP (OUTPATIENT)
Dept: INTERNAL MEDICINE CLINIC | Facility: CLINIC | Age: 59
End: 2024-12-06

## 2024-12-09 ENCOUNTER — OFFICE VISIT (OUTPATIENT)
Dept: INTERNAL MEDICINE CLINIC | Facility: CLINIC | Age: 59
End: 2024-12-09
Payer: COMMERCIAL

## 2024-12-09 VITALS — DIASTOLIC BLOOD PRESSURE: 90 MMHG | SYSTOLIC BLOOD PRESSURE: 146 MMHG | OXYGEN SATURATION: 98 % | HEART RATE: 96 BPM

## 2024-12-09 DIAGNOSIS — I10 ESSENTIAL HYPERTENSION: ICD-10-CM

## 2024-12-09 DIAGNOSIS — F32.9 REACTIVE DEPRESSION: ICD-10-CM

## 2024-12-09 DIAGNOSIS — F41.9 ANXIETY: Primary | ICD-10-CM

## 2024-12-09 PROCEDURE — 99214 OFFICE O/P EST MOD 30 MIN: CPT | Performed by: FAMILY MEDICINE

## 2024-12-09 NOTE — PROGRESS NOTES
Name: Kaylee Ellington      : 1965      MRN: [id number]  Encounter Provider: Larissa Chadwick MD  Encounter Date: 2024   Encounter department: [facility]  :  Assessment & Plan  Anxiety  Orders and recommendations as noted below.  Will increase the dose of the Lexapro to 10 mg on a daily basis.  Watch for any increasing anxiety or depression symptoms.  Try to remain as active as possible.  FMLA paperwork was completed.  Short-term disability paperwork provided today and will be completed.  Methods for relaxation discussed.  Orders:    escitalopram (LEXAPRO) 10 mg tablet; Take 1 tablet (10 mg total) by mouth daily    Reactive depression  Multiple stressors discussed.  Discussed with her the increased dose of the Lexapro.         Essential hypertension  Blood pressure much improved but not yet at goal.  She feels some of this is related to stress.  She we will continue with the valsartan.  Continue to follow blood pressure at home.       Orders and recommendations as noted above.  Will have her follow-up in about 3 to 4 weeks or sooner if needed.       History of Present Illness     She presents for follow-up.  Has been doing somewhat better.  Has been off of work and has noticed that she is sleeping better and better able to concentrate.  Still feels anxious at times.  Blood pressure has been variable but overall improved.  Tolerating the Lexapro without difficulty.  Denies any significant side effects.  Does feel that it is helping especially her anxiety symptoms but does not feel it is adequate at present.  Tolerating the valsartan well.  Denies any headaches or localized weakness.  Appetite has been generally stable.  Concentration is improving somewhat.  Still gets very anxious when thinking about work.  Still has episodes of crying at times especially with the stressors.      Review of Systems   Constitutional:  Positive for activity change and fatigue. Negative for appetite  change.   Cardiovascular:  Negative for chest pain and palpitations.   Neurological:  Negative for headaches.   Psychiatric/Behavioral:  Positive for decreased concentration, dysphoric mood and sleep disturbance. The patient is nervous/anxious.      Medical History Reviewed by provider this encounter:     .  Past Medical History   Past Medical History:   Diagnosis Date    Allergic     Anxiety      Past Surgical History:   Procedure Laterality Date    NO PAST SURGERIES      US GUIDED THYROID BIOPSY  12/18/2023     Family History   Problem Relation Age of Onset    Heart disease Mother     Other Father       reports that she has never smoked. She has never used smokeless tobacco. She reports that she does not drink alcohol and does not use drugs.  Current Outpatient Medications on File Prior to Visit   Medication Sig Dispense Refill    fexofenadine (ALLEGRA) 180 MG tablet Take 1 tablet (180 mg total) by mouth daily 90 tablet 3    fluticasone (FLONASE) 50 mcg/act nasal spray 1 spray into each nostril daily 16 g 5    LORazepam (ATIVAN) 0.5 mg tablet Take 1 tablet (0.5 mg total) by mouth every 8 (eight) hours as needed for anxiety 60 tablet 1    metFORMIN (GLUCOPHAGE) 500 mg tablet Take 2 tablets (1,000 mg total) by mouth 2 (two) times a day with meals 360 tablet 3    methimazole (TAPAZOLE) 10 mg tablet Take 1 tablet (10 mg total) by mouth daily 90 tablet 3    valsartan (DIOVAN) 320 MG tablet Take 1 tablet (320 mg total) by mouth daily 100 tablet 1    [DISCONTINUED] escitalopram (LEXAPRO) 5 mg tablet Take 1 tablet (5 mg total) by mouth daily 30 tablet 5     No current facility-administered medications on file prior to visit.     Allergies   Allergen Reactions    Measles Mumps And Rubella Virus Vaccine Live Arthralgia     Knee swelling      Current Outpatient Medications on File Prior to Visit   Medication Sig Dispense Refill    fexofenadine (ALLEGRA) 180 MG tablet Take 1 tablet (180 mg total) by mouth daily 90 tablet 3     fluticasone (FLONASE) 50 mcg/act nasal spray 1 spray into each nostril daily 16 g 5    LORazepam (ATIVAN) 0.5 mg tablet Take 1 tablet (0.5 mg total) by mouth every 8 (eight) hours as needed for anxiety 60 tablet 1    metFORMIN (GLUCOPHAGE) 500 mg tablet Take 2 tablets (1,000 mg total) by mouth 2 (two) times a day with meals 360 tablet 3    methimazole (TAPAZOLE) 10 mg tablet Take 1 tablet (10 mg total) by mouth daily 90 tablet 3    valsartan (DIOVAN) 320 MG tablet Take 1 tablet (320 mg total) by mouth daily 100 tablet 1    [DISCONTINUED] escitalopram (LEXAPRO) 5 mg tablet Take 1 tablet (5 mg total) by mouth daily 30 tablet 5     No current facility-administered medications on file prior to visit.      Social History     Tobacco Use    Smoking status: Never    Smokeless tobacco: Never   Vaping Use    Vaping status: Never Used   Substance and Sexual Activity    Alcohol use: No    Drug use: No    Sexual activity: Not on file        Objective   /90 (BP Location: Left arm, Patient Position: Sitting, Cuff Size: Large)   Pulse 96   SpO2 98%      Physical Exam  Vitals and nursing note reviewed.   Constitutional:       Appearance: She is well-developed, well-groomed and overweight.   Neck:      Vascular: No carotid bruit.   Cardiovascular:      Rate and Rhythm: Normal rate and regular rhythm.      Heart sounds: No murmur heard.  Pulmonary:      Breath sounds: No decreased breath sounds, wheezing or rhonchi.   Musculoskeletal:      Cervical back: Neck supple.   Lymphadenopathy:      Cervical: No cervical adenopathy.   Neurological:      Mental Status: She is alert.   Psychiatric:         Mood and Affect: Mood is anxious. Affect is tearful.         Speech: Speech normal.         Behavior: Behavior is cooperative.         Cognition and Memory: Cognition and memory normal.

## 2024-12-10 RX ORDER — ESCITALOPRAM OXALATE 10 MG/1
10 TABLET ORAL DAILY
Qty: 90 TABLET | Refills: 1 | Status: SHIPPED | OUTPATIENT
Start: 2024-12-10

## 2024-12-10 NOTE — ASSESSMENT & PLAN NOTE
Blood pressure much improved but not yet at goal.  She feels some of this is related to stress.  She we will continue with the valsartan.  Continue to follow blood pressure at home.

## 2024-12-10 NOTE — ASSESSMENT & PLAN NOTE
Orders and recommendations as noted below.  Will increase the dose of the Lexapro to 10 mg on a daily basis.  Watch for any increasing anxiety or depression symptoms.  Try to remain as active as possible.  FMLA paperwork was completed.  Short-term disability paperwork provided today and will be completed.  Methods for relaxation discussed.  Orders:    escitalopram (LEXAPRO) 10 mg tablet; Take 1 tablet (10 mg total) by mouth daily

## 2024-12-19 ENCOUNTER — TELEPHONE (OUTPATIENT)
Dept: INTERNAL MEDICINE CLINIC | Facility: CLINIC | Age: 59
End: 2024-12-19

## 2024-12-20 NOTE — TELEPHONE ENCOUNTER
I will complete the form either later today or over the weekend.  The form is extensive and requires a lot of time.  
Patient called telling POD the she has called several times to check if her disability papers were faxed yet.  I took the call and told patient that Dr. Chadwick was off yesterday and we are packed with sick people.  I will let Dr. Chadwick know.  
0

## 2025-01-02 ENCOUNTER — OFFICE VISIT (OUTPATIENT)
Dept: INTERNAL MEDICINE CLINIC | Facility: CLINIC | Age: 60
End: 2025-01-02
Payer: COMMERCIAL

## 2025-01-02 VITALS
HEART RATE: 103 BPM | DIASTOLIC BLOOD PRESSURE: 92 MMHG | HEIGHT: 65 IN | OXYGEN SATURATION: 99 % | BODY MASS INDEX: 29.64 KG/M2 | TEMPERATURE: 97.8 F | SYSTOLIC BLOOD PRESSURE: 150 MMHG | WEIGHT: 177.9 LBS

## 2025-01-02 DIAGNOSIS — F33.1 MODERATE EPISODE OF RECURRENT MAJOR DEPRESSIVE DISORDER (HCC): ICD-10-CM

## 2025-01-02 DIAGNOSIS — I10 ESSENTIAL HYPERTENSION: ICD-10-CM

## 2025-01-02 DIAGNOSIS — F41.9 ANXIETY: Primary | ICD-10-CM

## 2025-01-02 PROCEDURE — 99214 OFFICE O/P EST MOD 30 MIN: CPT | Performed by: FAMILY MEDICINE

## 2025-01-02 RX ORDER — ESCITALOPRAM OXALATE 20 MG/1
20 TABLET ORAL DAILY
Qty: 90 TABLET | Refills: 1 | Status: SHIPPED | OUTPATIENT
Start: 2025-01-02

## 2025-01-02 NOTE — ASSESSMENT & PLAN NOTE
Depression symptoms discussed.  Will increase the dose of the Lexapro.  Methods for stress relief discussed.

## 2025-01-02 NOTE — ASSESSMENT & PLAN NOTE
Ongoing anxiety and depression symptoms discussed.  Will increase the dose of the Lexapro.  Continue with FMLA at present.  Methods for stress relief discussed.  Orders:  •  escitalopram (LEXAPRO) 20 mg tablet; Take 1 tablet (20 mg total) by mouth daily

## 2025-01-02 NOTE — PROGRESS NOTES
Name: Kaylee Ellington      : 1965      MRN: [id number]  Encounter Provider: Larissa Chadwick MD  Encounter Date: 2025   Encounter department: [facility]  :  Assessment & Plan  Anxiety  Ongoing anxiety and depression symptoms discussed.  Will increase the dose of the Lexapro.  Continue with FMLA at present.  Methods for stress relief discussed.  Orders:  •  escitalopram (LEXAPRO) 20 mg tablet; Take 1 tablet (20 mg total) by mouth daily    Moderate episode of recurrent major depressive disorder (HCC)  Depression symptoms discussed.  Will increase the dose of the Lexapro.  Methods for stress relief discussed.       Essential hypertension  Pressure remains mildly elevated.  Continue current medications.  Watch salt intake.  Follow blood pressure at home.         Orders and recommendations as noted above.  Will have her follow-up in about 1 month for recheck or sooner if needed.    Depression Screening and Follow-up Plan:   Patient's depression screening was negative with an [place]  Depression Scale score of  .     History of Present Illness     She presents for follow-up.  Continues to feel very anxious at times.  States that she has difficulty concentrating.  Feels very overwhelmed with even smallest tasks.  She is worried about returning to work because of the need to concentrate and follow-through.  Does notice some improvement with the Lexapro.  Appetite has been stable.  Has some difficulty sleeping at times.  Tolerating the valsartan without difficulty.  Denies any headaches or localized weakness.  Does follow blood pressures at home.      Review of Systems   Constitutional:  Positive for activity change and fatigue.   Respiratory:  Negative for cough and shortness of breath.    Cardiovascular:  Negative for chest pain and palpitations.   Gastrointestinal:  Negative for abdominal pain.   Neurological:  Negative for dizziness, weakness, light-headedness, numbness and  "headaches.   Psychiatric/Behavioral:  Positive for decreased concentration, dysphoric mood and sleep disturbance.        Objective   /92 (BP Location: Left arm, Patient Position: Sitting, Cuff Size: Large)   Pulse 103   Temp 97.8 °F (36.6 °C)   Ht 5' 5\" (1.651 m)   Wt 80.7 kg (177 lb 14.4 oz)   SpO2 99%   BMI 29.60 kg/m²      Physical Exam  Vitals and nursing note reviewed.   Constitutional:       Appearance: She is well-developed, well-groomed and overweight.   Neck:      Vascular: No carotid bruit.   Cardiovascular:      Rate and Rhythm: Normal rate and regular rhythm.      Heart sounds: No murmur heard.  Pulmonary:      Breath sounds: No decreased breath sounds, wheezing or rhonchi.   Musculoskeletal:      Cervical back: Neck supple.   Neurological:      Mental Status: She is alert.   Psychiatric:         Mood and Affect: Mood is anxious.         Speech: Speech normal.         Behavior: Behavior is cooperative.         Cognition and Memory: Cognition and memory normal.         "

## 2025-01-02 NOTE — ASSESSMENT & PLAN NOTE
Pressure remains mildly elevated.  Continue current medications.  Watch salt intake.  Follow blood pressure at home.

## 2025-01-07 ENCOUNTER — TELEPHONE (OUTPATIENT)
Age: 60
End: 2025-01-07

## 2025-01-07 DIAGNOSIS — R73.02 IMPAIRED GLUCOSE TOLERANCE: ICD-10-CM

## 2025-01-07 NOTE — TELEPHONE ENCOUNTER
Phone call from patient stating [facility]'s Human Resources just notified patient they need a letter from Dr Chadwick stating why her leave has been extended and also to state she has a follow up visit on 1/30/2025 with Dr Chadwick. Human Resource needs this back by this Friday. Letter needs to go to [facility] [employer] Fax [phone]. If there are any questions please call patient.

## 2025-01-09 ENCOUNTER — TELEPHONE (OUTPATIENT)
Age: 60
End: 2025-01-09

## 2025-01-09 ENCOUNTER — TELEPHONE (OUTPATIENT)
Dept: INTERNAL MEDICINE CLINIC | Facility: CLINIC | Age: 60
End: 2025-01-09

## 2025-01-09 NOTE — TELEPHONE ENCOUNTER
Aliya with [employer] [facility] called asking if any office visit notes or progress notes from August of 2024 til January 2, 2025 can be faxed to her for review.  Her fax# is [phone].

## 2025-01-15 ENCOUNTER — TELEPHONE (OUTPATIENT)
Age: 60
End: 2025-01-15

## 2025-01-15 NOTE — TELEPHONE ENCOUNTER
Loli from [employer] called in to set up a peer to peer conference to discuss pt disability claim. [employer] can be reach at [phone]. Case # [id number]. Tried barbara munguia the office was not able to speak with anyone.

## 2025-01-16 NOTE — TELEPHONE ENCOUNTER
Loli from [employer] calling back to set up the peer to peer a good phone number we can call them back is [phone]

## 2025-01-20 NOTE — TELEPHONE ENCOUNTER
Loli from [facility] calling for Dr. Chadwick for the scheduled [id number] for today at 12:45 p.m.  Informed Loli Chadwick is out sick today and the meeting will need to be rescheduled.    Please call Loli back at [phone] with reference # [id number] to have this rescheduled.  She requested this be rescheduled before Friday, 1/24/25.

## 2025-01-24 NOTE — TELEPHONE ENCOUNTER
Called due to the peer to peer and the phone number listed could not the completed/was not accurate.  Called [employer] and attempted to reschedule the peer to peer.  They stated that the case was closed on January 22 despite the message in the chart stating that we had until today to complete this.  The woman I spoke with is going to try to have the case reopened.

## 2025-01-30 ENCOUNTER — OFFICE VISIT (OUTPATIENT)
Dept: INTERNAL MEDICINE CLINIC | Facility: CLINIC | Age: 60
End: 2025-01-30
Payer: COMMERCIAL

## 2025-01-30 VITALS
TEMPERATURE: 97.2 F | HEIGHT: 65 IN | SYSTOLIC BLOOD PRESSURE: 152 MMHG | HEART RATE: 103 BPM | WEIGHT: 178.2 LBS | DIASTOLIC BLOOD PRESSURE: 94 MMHG | OXYGEN SATURATION: 94 % | BODY MASS INDEX: 29.69 KG/M2

## 2025-01-30 DIAGNOSIS — F51.01 PRIMARY INSOMNIA: ICD-10-CM

## 2025-01-30 DIAGNOSIS — I10 ESSENTIAL HYPERTENSION: ICD-10-CM

## 2025-01-30 DIAGNOSIS — F41.9 ANXIETY: ICD-10-CM

## 2025-01-30 DIAGNOSIS — E05.90 HYPERTHYROIDISM: ICD-10-CM

## 2025-01-30 DIAGNOSIS — F33.1 MODERATE EPISODE OF RECURRENT MAJOR DEPRESSIVE DISORDER (HCC): Primary | ICD-10-CM

## 2025-01-30 PROCEDURE — 99214 OFFICE O/P EST MOD 30 MIN: CPT | Performed by: FAMILY MEDICINE

## 2025-01-31 ENCOUNTER — TELEPHONE (OUTPATIENT)
Dept: INTERNAL MEDICINE CLINIC | Facility: CLINIC | Age: 60
End: 2025-01-31

## 2025-01-31 RX ORDER — VALSARTAN AND HYDROCHLOROTHIAZIDE 320; 12.5 MG/1; MG/1
1 TABLET, FILM COATED ORAL DAILY
Qty: 100 TABLET | Refills: 3 | Status: SHIPPED | OUTPATIENT
Start: 2025-01-31 | End: 2025-02-06 | Stop reason: SDUPTHER

## 2025-01-31 RX ORDER — TRAZODONE HYDROCHLORIDE 50 MG/1
50 TABLET, FILM COATED ORAL
Qty: 90 TABLET | Refills: 3 | Status: SHIPPED | OUTPATIENT
Start: 2025-01-31 | End: 2025-02-06 | Stop reason: SDUPTHER

## 2025-01-31 RX ORDER — VALSARTAN AND HYDROCHLOROTHIAZIDE 320; 12.5 MG/1; MG/1
1 TABLET, FILM COATED ORAL DAILY
Qty: 100 TABLET | Refills: 3 | Status: SHIPPED | OUTPATIENT
Start: 2025-01-31 | End: 2025-01-31 | Stop reason: SDUPTHER

## 2025-01-31 NOTE — ASSESSMENT & PLAN NOTE
Discussed her persistent and slightly worsening depression symptoms.  Discussed with her considering following up with psychology.  She is going to go through a work program that provides counseling services.  She is going to look into this.  Continue with the Lexapro.  Would consider potentially adding Wellbutrin to help with the depression symptoms but potentially this could worsen anxiety.  Discussed with her considering following up with psychiatry.

## 2025-01-31 NOTE — ASSESSMENT & PLAN NOTE
Blood pressure continues to be elevated.  Will change her from the valsartan alone to the valsartan/hydrochlorothiazide as noted.  Watch salt intake.  Discussed with her getting a TSH checked to make sure that her hyperthyroidism has not worsened since this may be affecting the other medical issues.  Orders:  •  valsartan-hydrochlorothiazide (DIOVAN-HCT) 320-12.5 MG per tablet; Take 1 tablet by mouth daily

## 2025-01-31 NOTE — ASSESSMENT & PLAN NOTE
Anxiety symptoms have improved with the increased dose of the Lexapro.  Watch for any worsening.  Continue with the lorazepam on an as-needed basis but does not take this often.

## 2025-01-31 NOTE — TELEPHONE ENCOUNTER
Patient is also requesting a note to go to  Human Resources extending her time off from work for two weeks.  Can I put that together for you?

## 2025-02-04 ENCOUNTER — APPOINTMENT (OUTPATIENT)
Dept: LAB | Facility: HOSPITAL | Age: 60
End: 2025-02-04
Payer: COMMERCIAL

## 2025-02-04 DIAGNOSIS — E05.90 HYPERTHYROIDISM: ICD-10-CM

## 2025-02-04 LAB — TSH SERPL DL<=0.05 MIU/L-ACNC: 2.4 UIU/ML (ref 0.45–4.5)

## 2025-02-04 PROCEDURE — 84443 ASSAY THYROID STIM HORMONE: CPT

## 2025-02-04 PROCEDURE — 36415 COLL VENOUS BLD VENIPUNCTURE: CPT

## 2025-02-06 DIAGNOSIS — F51.01 PRIMARY INSOMNIA: ICD-10-CM

## 2025-02-06 DIAGNOSIS — I10 ESSENTIAL HYPERTENSION: ICD-10-CM

## 2025-02-06 RX ORDER — VALSARTAN AND HYDROCHLOROTHIAZIDE 320; 12.5 MG/1; MG/1
1 TABLET, FILM COATED ORAL DAILY
Qty: 100 TABLET | Refills: 0 | Status: SHIPPED | OUTPATIENT
Start: 2025-02-06

## 2025-02-06 RX ORDER — TRAZODONE HYDROCHLORIDE 50 MG/1
50 TABLET, FILM COATED ORAL
Qty: 90 TABLET | Refills: 0 | Status: SHIPPED | OUTPATIENT
Start: 2025-02-06

## 2025-02-11 ENCOUNTER — OFFICE VISIT (OUTPATIENT)
Dept: INTERNAL MEDICINE CLINIC | Facility: CLINIC | Age: 60
End: 2025-02-11
Payer: COMMERCIAL

## 2025-02-11 DIAGNOSIS — F41.9 ANXIETY: ICD-10-CM

## 2025-02-11 DIAGNOSIS — I10 ESSENTIAL HYPERTENSION: Primary | ICD-10-CM

## 2025-02-11 DIAGNOSIS — F33.1 MODERATE EPISODE OF RECURRENT MAJOR DEPRESSIVE DISORDER (HCC): ICD-10-CM

## 2025-02-11 PROCEDURE — 99214 OFFICE O/P EST MOD 30 MIN: CPT | Performed by: FAMILY MEDICINE

## 2025-02-11 NOTE — LETTER
February 12, 2025     Patient: Kaylee Ellington  YOB: 1965  Date of Visit: 2/11/2025      To Whom it May Concern:    Kaylee Ellington is under my professional care. Kaylee was seen in my office on 2/11/2025. Kaylee will be reassessed on 2/18/2025.  She requires adjustment to her medications prior to release to work.  Please excuse her until after evaluation on 2/18/2025.    If you have any questions or concerns, please don't hesitate to call.         Sincerely,          Larissa Chadwick MD        CC: No Recipients

## 2025-02-11 NOTE — ASSESSMENT & PLAN NOTE
Depression and anxiety symptoms persist.  They had improved somewhat but have worsened with recent stressors.  Discussed with her the need to get adequate sleep.  Hopefully the trazodone will be delivered today.  Continue with the Lexapro.  Would consider potentially adding Wellbutrin.  Consider referral to psychiatry.  Advised her to start through the work related program to help with mood and depression and anxiety symptoms.

## 2025-02-11 NOTE — ASSESSMENT & PLAN NOTE
Blood pressure is actually more elevated likely related to her not receiving her new dose of blood pressure medication from the mail order pharmacy.  She thinks this is going to be delivered today.  Discussed with her starting this immediately.  May need to add an additional medication if blood pressure remains elevated.  Follow-up blood pressure at home if possible.  Will have her follow-up in 1 week for blood pressure check or sooner if needed.

## 2025-02-11 NOTE — PROGRESS NOTES
Name: Kaylee Ellington      : 1965      MRN: [id number]  Encounter Provider: Larissa Chadwick MD  Encounter Date: 2025   Encounter department: [facility]  :  Assessment & Plan  Essential hypertension  Blood pressure is actually more elevated likely related to her not receiving her new dose of blood pressure medication from the mail order pharmacy.  She thinks this is going to be delivered today.  Discussed with her starting this immediately.  May need to add an additional medication if blood pressure remains elevated.  Follow-up blood pressure at home if possible.  Will have her follow-up in 1 week for blood pressure check or sooner if needed.       Moderate episode of recurrent major depressive disorder (HCC)  Depression and anxiety symptoms persist.  They had improved somewhat but have worsened with recent stressors.  Discussed with her the need to get adequate sleep.  Hopefully the trazodone will be delivered today.  Continue with the Lexapro.  Would consider potentially adding Wellbutrin.  Consider referral to psychiatry.  Advised her to start through the work related program to help with mood and depression and anxiety symptoms.       Anxiety  See above.              History of Present Illness   She presents for follow-up.  She never received the increased dose of her blood pressure medication or the trazodone from the pharmacy.  She is not sure why.  Has been having occasional headaches.  She felt that this was likely related to the stressors.  Has not been able to get her short-term disability approved as of yet.  Financially this is been more difficult for them.  She feels this is added an additional layer of stressors.  Denies any chest pain or palpitations.  Still not able to sleep more than about 3 hours.  Has difficulty falling asleep with the anxiety symptoms.  Has been having some episodes of sudden onset of anxiety symptoms and she feels these are likely panic  attacks.  Difficulty concentrating at times.  Has not been very active since she has not been feeling herself.      Review of Systems   Constitutional:  Positive for activity change, appetite change and fatigue.   Respiratory:  Negative for cough and shortness of breath.    Cardiovascular:  Negative for chest pain and palpitations.   Neurological:  Positive for headaches. Negative for dizziness and light-headedness.   Psychiatric/Behavioral:  Positive for decreased concentration, dysphoric mood and sleep disturbance. The patient is nervous/anxious.        Objective   There were no vitals taken for this visit.     Physical Exam  Vitals and nursing note reviewed.   Constitutional:       General: She is not in acute distress.     Appearance: She is well-developed and well-groomed.   HENT:      Head: Normocephalic and atraumatic.   Eyes:      General:         Right eye: No discharge.         Left eye: No discharge.      Conjunctiva/sclera: Conjunctivae normal.      Pupils: Pupils are equal, round, and reactive to light.   Cardiovascular:      Rate and Rhythm: Normal rate and regular rhythm.      Heart sounds: Normal heart sounds. No murmur heard.     No friction rub. No gallop.   Pulmonary:      Effort: No respiratory distress.      Breath sounds: No wheezing or rales.   Lymphadenopathy:      Cervical: No cervical adenopathy.   Skin:     General: Skin is warm and dry.   Neurological:      Mental Status: She is alert and oriented to person, place, and time.   Psychiatric:         Mood and Affect: Mood is anxious and depressed. Affect is tearful.         Speech: Speech normal.         Behavior: Behavior normal. Behavior is cooperative.         Cognition and Memory: Cognition and memory normal.

## 2025-02-11 NOTE — LETTER
February 12, 2025     Patient: Kaylee Ellington  YOB: 1965  Date of Visit: 2/11/2025      To Whom it May Concern:    Kaylee Ellington is under my professional care. Kaylee was seen in my office on 2/11/2025. Kaylee will be reassessed on 2/20/2025.  She requires adjustment to her medications prior to release to work.  Please excuse her until after evaluation on 2/20/2025.     If you have any questions or concerns, please don't hesitate to call.         Sincerely,          Larissa Chadwick MD

## 2025-02-12 ENCOUNTER — TELEPHONE (OUTPATIENT)
Age: 60
End: 2025-02-12

## 2025-02-12 NOTE — TELEPHONE ENCOUNTER
Patient called, states  [facility] updated note was sent in with the wrong date patients next visit is on 2/20/25.     Patient is requesting to please update the note for next visit 2/20/25 and please fax to: [facility] dept.  [phone].    Please Advise and notify patient when done please. Thank you.     Kaylee - [phone]

## 2025-02-20 ENCOUNTER — OFFICE VISIT (OUTPATIENT)
Dept: INTERNAL MEDICINE CLINIC | Facility: CLINIC | Age: 60
End: 2025-02-20
Payer: COMMERCIAL

## 2025-02-20 VITALS
BODY MASS INDEX: 29.09 KG/M2 | TEMPERATURE: 97.5 F | HEART RATE: 95 BPM | WEIGHT: 174.6 LBS | SYSTOLIC BLOOD PRESSURE: 132 MMHG | HEIGHT: 65 IN | DIASTOLIC BLOOD PRESSURE: 88 MMHG | OXYGEN SATURATION: 96 %

## 2025-02-20 DIAGNOSIS — I10 ESSENTIAL HYPERTENSION: ICD-10-CM

## 2025-02-20 DIAGNOSIS — F33.1 MODERATE EPISODE OF RECURRENT MAJOR DEPRESSIVE DISORDER (HCC): Primary | ICD-10-CM

## 2025-02-20 DIAGNOSIS — F41.9 ANXIETY: ICD-10-CM

## 2025-02-20 PROCEDURE — 99213 OFFICE O/P EST LOW 20 MIN: CPT | Performed by: FAMILY MEDICINE

## 2025-02-20 NOTE — ASSESSMENT & PLAN NOTE
Blood pressure significantly improved.  Still not quite at goal.  Will have her follow-up in about 1 to 2 months or sooner if needed.  Follow-up blood pressure at home.  Continue with the valsartan/hydrochlorothiazide.

## 2025-02-20 NOTE — LETTER
February 21, 2025     Patient: Kaylee Ellington  YOB: 1965  Date of Visit: 2/20/2025      To Whom it May Concern:    Kaylee Ellington is under my professional care. Kaylee was seen in my office on 2/20/2025. Kaylee may return to work on 2/27/25 .    If you have any questions or concerns, please don't hesitate to call.         Sincerely,          Larissa Chadwick MD        CC: No Recipients

## 2025-02-20 NOTE — PROGRESS NOTES
Name: Kaylee Ellington      : 1965      MRN: [id number]  Encounter Provider: Larissa Chadwick MD  Encounter Date: 2025   Encounter department: [facility]  :  Assessment & Plan  Moderate episode of recurrent major depressive disorder (HCC)  Continues with symptoms of anxiety and depression which wax and wane.  Continue with the Lexapro.  Discussed with her taking the trazodone at bedtime if needed since she has not started this.  May continue with the lorazepam on an as-needed basis.  Will refer her for counseling.  Symptoms have improved adequately to return to work.  Orders:  •  Ambulatory referral to Psych Services; Future    Anxiety  See above.  Orders:  •  Ambulatory referral to Psych Services; Future    Essential hypertension  Blood pressure significantly improved.  Still not quite at goal.  Will have her follow-up in about 1 to 2 months or sooner if needed.  Follow-up blood pressure at home.  Continue with the valsartan/hydrochlorothiazide.              History of Present Illness   She presents for follow-up.  Did not take the trazodone since she began sleeping better.  Did feel that her depression symptoms had been improving somewhat but is difficult to tell because she feels stressed and worried about returning to work and some financial issues.  Still feels anxious at times but has improved somewhat.  Did have 2 episodes of significant anxiety over the last week mostly in the evening.  Tolerating valsartan/hydrochlorothiazide without difficulty.  Blood pressure has improved.  Denies any headaches or localized weakness      Review of Systems   Constitutional:  Positive for activity change and fatigue. Negative for appetite change.   Neurological:  Negative for dizziness, weakness, light-headedness and headaches.   Psychiatric/Behavioral:  Positive for dysphoric mood and sleep disturbance. Negative for hallucinations, self-injury and suicidal ideas. The patient is  "nervous/anxious. The patient is not hyperactive.        Objective   /88 (BP Location: Left arm, Patient Position: Sitting, Cuff Size: Large)   Pulse 95   Temp 97.5 °F (36.4 °C)   Ht 5' 5\" (1.651 m)   Wt 79.2 kg (174 lb 9.6 oz)   SpO2 96%   BMI 29.05 kg/m²      Physical Exam  Vitals and nursing note reviewed.   Constitutional:       Appearance: She is well-developed, well-groomed and overweight.   Cardiovascular:      Rate and Rhythm: Normal rate and regular rhythm.   Neurological:      Mental Status: She is alert.   Psychiatric:         Mood and Affect: Mood is anxious.         Speech: Speech normal.         Behavior: Behavior is cooperative.         Cognition and Memory: Cognition and memory normal.         "

## 2025-02-20 NOTE — ASSESSMENT & PLAN NOTE
Continues with symptoms of anxiety and depression which wax and wane.  Continue with the Lexapro.  Discussed with her taking the trazodone at bedtime if needed since she has not started this.  May continue with the lorazepam on an as-needed basis.  Will refer her for counseling.  Symptoms have improved adequately to return to work.  Orders:  •  Ambulatory referral to Psych Services; Future

## 2025-02-22 DIAGNOSIS — J30.9 ALLERGIC RHINITIS, UNSPECIFIED SEASONALITY, UNSPECIFIED TRIGGER: ICD-10-CM

## 2025-02-23 RX ORDER — FEXOFENADINE HCL 180 MG/1
180 TABLET ORAL DAILY
Qty: 90 TABLET | Refills: 0 | Status: SHIPPED | OUTPATIENT
Start: 2025-02-23

## 2025-03-12 ENCOUNTER — TELEPHONE (OUTPATIENT)
Age: 60
End: 2025-03-12

## 2025-03-12 NOTE — TELEPHONE ENCOUNTER
Patient has applied for disability.  Dr. Fuentes, Psychiatry, will be completing her paperwork and would like to have a [id number] consultation with Dr. Chadwick to discuss any limitations this patient may have.  Please contact Dr. Fuentes office at [phone].  Thank you.

## 2025-04-01 ENCOUNTER — OFFICE VISIT (OUTPATIENT)
Dept: INTERNAL MEDICINE CLINIC | Facility: CLINIC | Age: 60
End: 2025-04-01
Payer: COMMERCIAL

## 2025-04-01 DIAGNOSIS — I10 ESSENTIAL HYPERTENSION: Primary | ICD-10-CM

## 2025-04-01 DIAGNOSIS — R73.02 IMPAIRED GLUCOSE TOLERANCE: ICD-10-CM

## 2025-04-01 DIAGNOSIS — F33.1 MODERATE EPISODE OF RECURRENT MAJOR DEPRESSIVE DISORDER (HCC): ICD-10-CM

## 2025-04-01 DIAGNOSIS — E78.2 MIXED HYPERLIPIDEMIA: ICD-10-CM

## 2025-04-01 DIAGNOSIS — F41.9 ANXIETY: ICD-10-CM

## 2025-04-01 PROCEDURE — 99214 OFFICE O/P EST MOD 30 MIN: CPT | Performed by: FAMILY MEDICINE

## 2025-04-02 VITALS
HEART RATE: 90 BPM | OXYGEN SATURATION: 95 % | DIASTOLIC BLOOD PRESSURE: 64 MMHG | HEIGHT: 65 IN | SYSTOLIC BLOOD PRESSURE: 106 MMHG | WEIGHT: 174.1 LBS | BODY MASS INDEX: 29.01 KG/M2 | TEMPERATURE: 97.7 F

## 2025-04-02 NOTE — ASSESSMENT & PLAN NOTE
Cholesterol has been mildly elevated in the past.  Will continue to follow this with routine laboratory testing and make recommendations.  Orders:  •  CBC and differential; Future  •  Comprehensive metabolic panel; Future  •  Lipid panel; Future  •  TSH, 3rd generation; Future

## 2025-04-02 NOTE — ASSESSMENT & PLAN NOTE
Blood pressure running significantly lower.  Discussed with her watching for any orthostatic symptoms.  Follow blood pressure at home.  For now continue with the valsartan/hydrochlorothiazide.  If blood pressure continues to run low or if she develops any orthostatic symptoms, would consider removing the hydrochlorothiazide.  Slip given for routine laboratory testing prior to her next visit.  Orders:  •  CBC and differential; Future  •  Comprehensive metabolic panel; Future

## 2025-04-02 NOTE — ASSESSMENT & PLAN NOTE
Mood has improved significantly.  Continue with the Lexapro.  Continue with the trazodone for sleep.    Orders:  •  CBC and differential; Future

## 2025-04-02 NOTE — ASSESSMENT & PLAN NOTE
Continue with the metformin.  Continue to watch carbohydrate intake.  Try to increase activity level.  Orders:  •  CBC and differential; Future  •  Hemoglobin A1C; Future

## 2025-04-02 NOTE — PROGRESS NOTES
Name: Kaylee Ellington      : 1965      MRN: [id number]  Encounter Provider: Larissa Chadwick MD  Encounter Date: 2025   Encounter department: [facility]  :  Assessment & Plan  Essential hypertension  Blood pressure running significantly lower.  Discussed with her watching for any orthostatic symptoms.  Follow blood pressure at home.  For now continue with the valsartan/hydrochlorothiazide.  If blood pressure continues to run low or if she develops any orthostatic symptoms, would consider removing the hydrochlorothiazide.  Slip given for routine laboratory testing prior to her next visit.  Orders:  •  CBC and differential; Future  •  Comprehensive metabolic panel; Future    Moderate episode of recurrent major depressive disorder (HCC)  Mood has improved significantly.  Continue with the Lexapro.  Continue with the trazodone for sleep.    Orders:  •  CBC and differential; Future    Anxiety  Anxiety symptoms have improved.  Continue with the lorazepam on an as-needed basis.  Continue with the Lexapro.  Orders:  •  CBC and differential; Future    Mixed hyperlipidemia  Cholesterol has been mildly elevated in the past.  Will continue to follow this with routine laboratory testing and make recommendations.  Orders:  •  CBC and differential; Future  •  Comprehensive metabolic panel; Future  •  Lipid panel; Future  •  TSH, 3rd generation; Future    Impaired glucose tolerance  Continue with the metformin.  Continue to watch carbohydrate intake.  Try to increase activity level.  Orders:  •  CBC and differential; Future  •  Hemoglobin A1C; Future           History of Present Illness   She presents for follow-up.  Has generally been doing relatively well.  She has returned to work and is doing well.  She has adjusted back to work better than she expected.  Feels that the Lexapro is helping significantly.  Does not feel as pressured or anxious as she had previously.  Has been sleeping  "significantly better.  Continues with the trazodone.  Has not needed to take the lorazepam regularly.  Tolerating the valsartan/hydrochlorothiazide without difficulty.  She did check her blood pressure at home and did noticed that it was significantly lower.  Did have 1 episode of feeling somewhat lightheaded upon standing from a bending position quickly.  No other orthostatic symptoms.  Denies any chest pain or palpitations.      Review of Systems   Constitutional:  Positive for activity change. Negative for appetite change, chills and fever.   HENT:  Negative for congestion and rhinorrhea.    Eyes:  Negative for visual disturbance.   Respiratory:  Negative for chest tightness and shortness of breath.    Cardiovascular:  Negative for chest pain and palpitations.   Gastrointestinal:  Negative for abdominal pain, blood in stool, diarrhea, nausea and vomiting.   Endocrine: Negative for polydipsia, polyphagia and polyuria.   Genitourinary:  Negative for dysuria, frequency and urgency.   Musculoskeletal:  Negative for gait problem.   Skin:  Negative for color change.   Neurological:  Negative for dizziness and headaches.   Hematological:  Does not bruise/bleed easily.   Psychiatric/Behavioral:  Negative for confusion and sleep disturbance. The patient is not nervous/anxious.        Objective   /64   Pulse 90   Temp 97.7 °F (36.5 °C)   Ht 5' 5\" (1.651 m)   Wt 79 kg (174 lb 1.6 oz)   SpO2 95%   BMI 28.97 kg/m²      Physical Exam  Vitals and nursing note reviewed.   Constitutional:       General: She is not in acute distress.     Appearance: She is well-developed, well-groomed and overweight.   HENT:      Head: Normocephalic and atraumatic.   Eyes:      General:         Right eye: No discharge.         Left eye: No discharge.      Conjunctiva/sclera: Conjunctivae normal.      Pupils: Pupils are equal, round, and reactive to light.   Cardiovascular:      Rate and Rhythm: Normal rate and regular rhythm.      " Heart sounds: Normal heart sounds. No murmur heard.     No friction rub. No gallop.   Pulmonary:      Effort: No respiratory distress.      Breath sounds: No wheezing or rales.   Abdominal:      General: Bowel sounds are normal. There is no distension.      Tenderness: There is no abdominal tenderness.   Lymphadenopathy:      Cervical: No cervical adenopathy.   Skin:     General: Skin is warm and dry.   Neurological:      Mental Status: She is alert and oriented to person, place, and time.   Psychiatric:         Mood and Affect: Mood and affect normal.         Speech: Speech normal.         Behavior: Behavior normal. Behavior is cooperative.         Cognition and Memory: Cognition and memory normal.

## 2025-04-02 NOTE — ASSESSMENT & PLAN NOTE
Anxiety symptoms have improved.  Continue with the lorazepam on an as-needed basis.  Continue with the Lexapro.  Orders:  •  CBC and differential; Future

## 2025-04-07 ENCOUNTER — TELEPHONE (OUTPATIENT)
Age: 60
End: 2025-04-07

## 2025-04-07 NOTE — TELEPHONE ENCOUNTER
Pt called in to report she wanted to f/u with the provider about her blood pressures over the weekend. Pt had 2-bouts of dizziness over the weekend, Bp:117/62 and 90/68 and this morning without her bp meds her bp:111/76. Triage nurse instructed pt to hold her bp medication till further advised by the provider. Pt denies any dizziness right now, no sob, chest pain at this time. Triage nurse advised pt to go  to the ER, if anything worsened.  PCP please call pt to f/u. Pt gave permission for the provider to leave detailed message.

## 2025-04-08 DIAGNOSIS — J30.9 ALLERGIC RHINITIS, UNSPECIFIED SEASONALITY, UNSPECIFIED TRIGGER: ICD-10-CM

## 2025-04-08 NOTE — TELEPHONE ENCOUNTER
Patient called to return message to Ro. The Blood pressure medication that she is taking and currently has at home is the Valsartan- HCTZ 12.5 mg tablet. Which she is taking as prescribed 1 time per day.    Pt states ok to leave detailed message on VM with update as she is at work currently.

## 2025-04-08 NOTE — TELEPHONE ENCOUNTER
Medication: Flonase    Dose/Frequency: 1 spray each nostril daily    Quantity: 16g    [facility]    Office:   [x] PCP/Provider -   [] Speciality/Provider -     Does the patient have enough for 3 days?   [x] Yes   [] No - Send as HP to POD     Medication: allegra 180 mg tablet    Dose/Frequency: 1 tab by mouth daily    Quantity: 90    Pharmacy: [facility]    Office:   [x] PCP/Provider -   [] Speciality/Provider -     Does the patient have enough for 3 days?   [x] Yes   [] No - Send as HP to POD

## 2025-04-08 NOTE — TELEPHONE ENCOUNTER
Does she have any other doses of the blood pressure medicine left at home?  Will likely still need blood pressure medication but just a lower dosage.

## 2025-04-09 RX ORDER — FLUTICASONE PROPIONATE 50 MCG
1 SPRAY, SUSPENSION (ML) NASAL DAILY
Qty: 16 G | Refills: 5 | Status: SHIPPED | OUTPATIENT
Start: 2025-04-09

## 2025-04-09 RX ORDER — FEXOFENADINE HCL 180 MG/1
180 TABLET ORAL DAILY
Qty: 90 TABLET | Refills: 1 | Status: SHIPPED | OUTPATIENT
Start: 2025-04-09

## 2025-04-11 NOTE — TELEPHONE ENCOUNTER
She can try breaking this in half or I can send in a different prescription.  We had adjusted her dose frequently so I thought maybe she had some of the previous dosages leftover at home.

## 2025-04-16 NOTE — TELEPHONE ENCOUNTER
Can hold the medication and follow-up blood pressures closely.  Blood pressure was very significantly elevated previously.

## 2025-04-16 NOTE — TELEPHONE ENCOUNTER
Patient called in wanting to let the provider know her BP is starting to creep up again. She noticed this yesterday. Her BP today was 143/84. No symptoms reported

## 2025-04-18 DIAGNOSIS — J30.9 ALLERGIC RHINITIS, UNSPECIFIED SEASONALITY, UNSPECIFIED TRIGGER: ICD-10-CM

## 2025-04-18 RX ORDER — FEXOFENADINE HCL 180 MG/1
180 TABLET ORAL DAILY
Qty: 90 TABLET | Refills: 0 | OUTPATIENT
Start: 2025-04-18

## 2025-05-08 ENCOUNTER — TELEMEDICINE (OUTPATIENT)
Dept: OTHER | Facility: HOSPITAL | Age: 60
End: 2025-05-08
Payer: COMMERCIAL

## 2025-05-08 VITALS — DIASTOLIC BLOOD PRESSURE: 82 MMHG | SYSTOLIC BLOOD PRESSURE: 144 MMHG

## 2025-05-08 DIAGNOSIS — K08.89 PAIN, DENTAL: Primary | ICD-10-CM

## 2025-05-08 PROCEDURE — 99213 OFFICE O/P EST LOW 20 MIN: CPT | Performed by: NURSE PRACTITIONER

## 2025-05-08 RX ORDER — AMOXICILLIN 500 MG/1
500 TABLET, FILM COATED ORAL 3 TIMES DAILY
Qty: 30 TABLET | Refills: 0 | Status: SHIPPED | OUTPATIENT
Start: 2025-05-08 | End: 2025-05-18

## 2025-05-08 NOTE — PROGRESS NOTES
Virtual Regular Visit  Name: Kaylee Ellington      : 1965      MRN: [id number]  Encounter Provider: RILEY Manuel  Encounter Date: 2025   Encounter department: [facility] CARE       Verification of patient location:  Patient is located at Home in the following state in which I hold an active license [place] :  Assessment & Plan  Pain, dental    Orders:    amoxicillin (AMOXIL) 500 MG tablet; Take 1 tablet (500 mg total) by mouth 3 (three) times a day for 10 days        Encounter provider RILEY Manuel    The patient was identified by name and date of birth. Kaylee Ellington was informed that this is a telemedicine visit and that the visit is being conducted through the Epic Embedded platform. She agrees to proceed..  My office door was closed. No one else was in the room. She acknowledged consent and understanding of privacy and security of the video platform. The patient has agreed to participate and understands they can discontinue the visit at any time.    Patient is aware this is a billable service.     History obtained from: patient  History of Present Illness     This is a 59 year old female here today for video visit.  She states she thinks has abscessed tooth.  She has noticed some swelling.  She states she is having some mild pain.  No fevers.  She states she has had abscess in tooth in past.  She would like to start antibiotic so it does not get worse. .        Review of Systems   Constitutional:  Negative for activity change, chills, fatigue and fever.   HENT:  Positive for dental problem.    Cardiovascular: Negative.    Gastrointestinal: Negative.    Neurological: Negative.    Psychiatric/Behavioral: Negative.         Objective   /82 Comment: patient reported    Physical Exam  Constitutional:       General: She is not in acute distress.     Appearance: Normal appearance. She is not ill-appearing or toxic-appearing.   HENT:      Head:        Comments: Mild swelling noted  Unable to see  any dental infection.   Pulmonary:      Effort: Pulmonary effort is normal. No respiratory distress.   Neurological:      Mental Status: She is alert and oriented to person, place, and time.   Psychiatric:         Mood and Affect: Mood normal.         Behavior: Behavior normal.         Thought Content: Thought content normal.         Judgment: Judgment normal.         Visit Time  Total Visit Duration: 5 minutes not including the time spent for establishing the audio/video connection.

## 2025-05-08 NOTE — PATIENT INSTRUCTIONS
"Will start antibiotic. Take probiotic.  Salt water gargles.  Tylenol as needed for pain.  Follow up with dentist.  Go to Er with any worsening symptoms.      Thank you for choosing to trust [facility] with your care today. Virtual visits are very convenient, but do have some limitations. Schedule a follow-up appointment with your primary care physician for recheck in person in 2-3 days-especially if symptoms aren't improving. If you cannot see your PCP, you can schedule a follow up appointment at a [facility]. Notes for work/school can be found in \"Letters\" section of [employer] [email or web address]  Any referrals placed can be found under \"Upcoming Tests & Procedures  Care Anywhere phone number is [phone] if you need assistance or have further questions     [phone])  Schedule or Reschedule Outpatient Testing - Option 2  Billing - Option 3  General Info - Option 4  [employer] Help - Option 5  Comprehensive Spine Program - Option 6        "

## 2025-05-10 ENCOUNTER — APPOINTMENT (OUTPATIENT)
Dept: LAB | Facility: HOSPITAL | Age: 60
End: 2025-05-10
Attending: PREVENTIVE MEDICINE
Payer: COMMERCIAL

## 2025-05-10 ENCOUNTER — APPOINTMENT (OUTPATIENT)
Dept: LAB | Facility: HOSPITAL | Age: 60
End: 2025-05-10
Payer: COMMERCIAL

## 2025-05-10 DIAGNOSIS — Z00.8 HEALTH EXAMINATION IN POPULATION SURVEY: ICD-10-CM

## 2025-05-10 DIAGNOSIS — R73.02 IMPAIRED GLUCOSE TOLERANCE: ICD-10-CM

## 2025-05-10 DIAGNOSIS — E78.2 MIXED HYPERLIPIDEMIA: ICD-10-CM

## 2025-05-10 DIAGNOSIS — I10 ESSENTIAL HYPERTENSION: ICD-10-CM

## 2025-05-10 DIAGNOSIS — E05.90 HYPERTHYROIDISM: ICD-10-CM

## 2025-05-10 DIAGNOSIS — F41.9 ANXIETY: ICD-10-CM

## 2025-05-10 DIAGNOSIS — F33.1 MODERATE EPISODE OF RECURRENT MAJOR DEPRESSIVE DISORDER (HCC): ICD-10-CM

## 2025-05-10 LAB
ALBUMIN SERPL BCG-MCNC: 4.2 G/DL (ref 3.5–5)
ALP SERPL-CCNC: 84 U/L (ref 34–104)
ALT SERPL W P-5'-P-CCNC: 8 U/L (ref 7–52)
ANION GAP SERPL CALCULATED.3IONS-SCNC: 10 MMOL/L (ref 4–13)
AST SERPL W P-5'-P-CCNC: 10 U/L (ref 13–39)
BASOPHILS # BLD AUTO: 0.08 THOUSANDS/ÂΜL (ref 0–0.1)
BASOPHILS NFR BLD AUTO: 1 % (ref 0–1)
BILIRUB SERPL-MCNC: 1.04 MG/DL (ref 0.2–1)
BUN SERPL-MCNC: 12 MG/DL (ref 5–25)
CALCIUM SERPL-MCNC: 9.4 MG/DL (ref 8.4–10.2)
CHLORIDE SERPL-SCNC: 103 MMOL/L (ref 96–108)
CHOLEST SERPL-MCNC: 204 MG/DL (ref ?–200)
CO2 SERPL-SCNC: 26 MMOL/L (ref 21–32)
CREAT SERPL-MCNC: 1.01 MG/DL (ref 0.6–1.3)
EOSINOPHIL # BLD AUTO: 0.13 THOUSAND/ÂΜL (ref 0–0.61)
EOSINOPHIL NFR BLD AUTO: 1 % (ref 0–6)
ERYTHROCYTE [DISTWIDTH] IN BLOOD BY AUTOMATED COUNT: 14.4 % (ref 11.6–15.1)
EST. AVERAGE GLUCOSE BLD GHB EST-MCNC: 111 MG/DL
GFR SERPL CREATININE-BSD FRML MDRD: 61 ML/MIN/1.73SQ M
GLUCOSE P FAST SERPL-MCNC: 100 MG/DL (ref 65–99)
HBA1C MFR BLD: 5.5 %
HCT VFR BLD AUTO: 37.4 % (ref 34.8–46.1)
HDLC SERPL-MCNC: 59 MG/DL
HGB BLD-MCNC: 12.5 G/DL (ref 11.5–15.4)
IMM GRANULOCYTES # BLD AUTO: 0.03 THOUSAND/UL (ref 0–0.2)
IMM GRANULOCYTES NFR BLD AUTO: 0 % (ref 0–2)
LDLC SERPL CALC-MCNC: 127 MG/DL (ref 0–100)
LYMPHOCYTES # BLD AUTO: 1.09 THOUSANDS/ÂΜL (ref 0.6–4.47)
LYMPHOCYTES NFR BLD AUTO: 11 % (ref 14–44)
MCH RBC QN AUTO: 29.8 PG (ref 26.8–34.3)
MCHC RBC AUTO-ENTMCNC: 33.4 G/DL (ref 31.4–37.4)
MCV RBC AUTO: 89 FL (ref 82–98)
MONOCYTES # BLD AUTO: 0.67 THOUSAND/ÂΜL (ref 0.17–1.22)
MONOCYTES NFR BLD AUTO: 7 % (ref 4–12)
NEUTROPHILS # BLD AUTO: 7.71 THOUSANDS/ÂΜL (ref 1.85–7.62)
NEUTS SEG NFR BLD AUTO: 80 % (ref 43–75)
NONHDLC SERPL-MCNC: 145 MG/DL
NRBC BLD AUTO-RTO: 0 /100 WBCS
PLATELET # BLD AUTO: 509 THOUSANDS/UL (ref 149–390)
PMV BLD AUTO: 9.2 FL (ref 8.9–12.7)
POTASSIUM SERPL-SCNC: 3.5 MMOL/L (ref 3.5–5.3)
PROT SERPL-MCNC: 7.4 G/DL (ref 6.4–8.4)
RBC # BLD AUTO: 4.2 MILLION/UL (ref 3.81–5.12)
SODIUM SERPL-SCNC: 139 MMOL/L (ref 135–147)
T4 FREE SERPL-MCNC: 0.98 NG/DL (ref 0.61–1.12)
TRIGL SERPL-MCNC: 90 MG/DL (ref ?–150)
TSH SERPL DL<=0.05 MIU/L-ACNC: 2.17 UIU/ML (ref 0.45–4.5)
WBC # BLD AUTO: 9.71 THOUSAND/UL (ref 4.31–10.16)

## 2025-05-10 PROCEDURE — 85025 COMPLETE CBC W/AUTO DIFF WBC: CPT

## 2025-05-10 PROCEDURE — 36415 COLL VENOUS BLD VENIPUNCTURE: CPT

## 2025-05-10 PROCEDURE — 84443 ASSAY THYROID STIM HORMONE: CPT

## 2025-05-10 PROCEDURE — 80053 COMPREHEN METABOLIC PANEL: CPT

## 2025-05-10 PROCEDURE — 80061 LIPID PANEL: CPT

## 2025-05-10 PROCEDURE — 84439 ASSAY OF FREE THYROXINE: CPT

## 2025-05-10 PROCEDURE — 83036 HEMOGLOBIN GLYCOSYLATED A1C: CPT

## 2025-05-15 ENCOUNTER — OFFICE VISIT (OUTPATIENT)
Dept: ENDOCRINOLOGY | Facility: CLINIC | Age: 60
End: 2025-05-15
Payer: COMMERCIAL

## 2025-05-15 VITALS
HEART RATE: 82 BPM | SYSTOLIC BLOOD PRESSURE: 130 MMHG | WEIGHT: 170.8 LBS | HEIGHT: 65 IN | BODY MASS INDEX: 28.46 KG/M2 | TEMPERATURE: 97.3 F | DIASTOLIC BLOOD PRESSURE: 82 MMHG

## 2025-05-15 DIAGNOSIS — E05.90 HYPERTHYROIDISM: Primary | ICD-10-CM

## 2025-05-15 DIAGNOSIS — E04.2 MULTIPLE THYROID NODULES: ICD-10-CM

## 2025-05-15 PROCEDURE — 99214 OFFICE O/P EST MOD 30 MIN: CPT | Performed by: STUDENT IN AN ORGANIZED HEALTH CARE EDUCATION/TRAINING PROGRAM

## 2025-05-15 NOTE — ASSESSMENT & PLAN NOTE
Resolved on methimazole. Graves' antibody negative. 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 Detailed discussion held regarding potential treatment options, including surgery and radioactive iodine therapy. Current plan is to continue with the existing medication regimen; an ultrasound will be scheduled towards the end of the year for nodule surveillance.    Follow-up: 12/2025.    Orders:  •  TSH, 3rd generation; Future  •  T4, free; Future

## 2025-05-15 NOTE — PROGRESS NOTES
Name: Kaylee Ellington      : 1965      MRN: [id number]  Encounter Provider: Nicolás Reinoso, DO  Encounter Date: 5/15/2025   Encounter department: [facility]    No chief complaint on file.  :  Assessment & Plan  Hyperthyroidism  Resolved on methimazole. Graves' antibody negative. 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 Detailed discussion held regarding potential treatment options, including surgery and radioactive iodine therapy. Current plan is to continue with the existing medication regimen; an ultrasound will be scheduled towards the end of the year for nodule surveillance.    Follow-up: 2025.    Orders:  •  TSH, 3rd generation; Future  •  T4, free; Future    Multiple thyroid nodules  We have a plan to follow up thyroid US in 6-months         RTC 6-months    History of Present Illness   History of Present Illness  The patient is a 59-year-old female here today for a follow-up of hypothyroidism.    She has been managing her condition with methimazole, which she reports as effective. She expresses satisfaction with the current treatment plan and does not wish to pursue surgical intervention at this time.    She experienced a period of heightened anxiety, culminating in a severe anxiety attack during a consultation with another physician. This episode led to a temporary leave from work for approximately 3 months. Her sleep pattern was disrupted, with difficulty falling asleep until 3 AM and compensatory daytime sleep. She was prescribed Lexapro, initially at a lower dose, which was subsequently increased to 20 mg. The addition of trazodone and Ativan was suggested, but she declined these medications. She reports that the Lexapro has been beneficial in managing her anxiety.    Concurrently, she experienced elevated blood pressure, which was managed with valsartan, resulting in a decrease to 150 systolic. However, her blood pressure fluctuated, occasionally dropping to 90/60.  "She was advised to adjust her valsartan dosage accordingly. For the past month, her blood pressure has remained stable at 128/80 without medication. She monitors her blood pressure at home 5 times daily, typically recording readings around 120 to 130 systolic. When her blood pressure began to rise towards 140, she resumed taking half a tablet of valsartan, which effectively reduced her blood pressure within 2 days. She continues to take HCTZ.    Pertinent Medical History         Review of Systems as per [facility]       Medical History Reviewed by provider this encounter:  Tobacco  Allergies  Meds  Problems  Med Hx  Surg Hx  Fam Hx     .    Objective   /82 (Patient Position: Sitting, Cuff Size: Standard)   Pulse 82   Temp (!) 97.3 °F (36.3 °C) (Temporal)   Ht 5' 5\" (1.651 m)   Wt 77.5 kg (170 lb 12.8 oz)   BMI 28.42 kg/m²      Body mass index is 28.42 kg/m².  Wt Readings from Last 3 Encounters:   05/15/25 77.5 kg (170 lb 12.8 oz)   04/01/25 79 kg (174 lb 1.6 oz)   02/20/25 79.2 kg (174 lb 9.6 oz)     Physical Exam  Vitals reviewed.   Constitutional:       General: She is not in acute distress.     Appearance: Normal appearance.   HENT:      Head: Normocephalic and atraumatic.     Eyes:      General: No scleral icterus.     Conjunctiva/sclera: Conjunctivae normal.     Neck:      Thyroid: Thyroid mass and thyromegaly present.   Pulmonary:      Effort: Pulmonary effort is normal. No respiratory distress.     Musculoskeletal:         General: No deformity.      Cervical back: Normal range of motion.   Lymphadenopathy:      Cervical: No cervical adenopathy.     Neurological:      General: No focal deficit present.      Mental Status: She is alert.     Psychiatric:         Mood and Affect: Mood normal.         Behavior: Behavior normal.         Results  Imaging:  Thyroid uptake scan shows higher levels of activity in the first 6 hours and normal levels by 24 hours. Areas of darker accumulation indicate " hormonally hyperfunctioning nodules.    Diagnostic testing:  Molecular testing (Afirma): Risk of malignancy is about 4%.  Labs:   Lab Results   Component Value Date    HGBA1C 5.5 05/10/2025    HGBA1C 5.6 12/02/2024    HGBA1C 5.6 04/28/2024     Lab Results   Component Value Date    CREATININE 1.01 05/10/2025    CREATININE 0.91 12/02/2024    CREATININE 0.90 04/28/2024    BUN 12 05/10/2025    K 3.5 05/10/2025     05/10/2025    CO2 26 05/10/2025     eGFR   Date Value Ref Range Status   05/10/2025 61 ml/min/1.73sq m Final     Lab Results   Component Value Date    CHOL 226 08/17/2015    HDL 59 05/10/2025    TRIG 90 05/10/2025     Lab Results   Component Value Date    ALT 8 05/10/2025    AST 10 (L) 05/10/2025    ALKPHOS 84 05/10/2025     Lab Results   Component Value Date    YPI1AAJBMJLD 2.171 05/10/2025    RIW5MUYDIUDV 2.399 02/04/2025    ZWI8EVOYOHVK 3.298 12/02/2024     Lab Results   Component Value Date    FREET4 0.98 05/10/2025       There are no Patient Instructions on file for this visit.    Discussed with the patient and all questioned fully answered. She will call me if any problems arise.

## 2025-05-21 ENCOUNTER — RESULTS FOLLOW-UP (OUTPATIENT)
Dept: INTERNAL MEDICINE CLINIC | Facility: CLINIC | Age: 60
End: 2025-05-21

## 2025-07-01 ENCOUNTER — OFFICE VISIT (OUTPATIENT)
Dept: INTERNAL MEDICINE CLINIC | Facility: CLINIC | Age: 60
End: 2025-07-01
Payer: COMMERCIAL

## 2025-07-01 VITALS
BODY MASS INDEX: 29.11 KG/M2 | HEART RATE: 87 BPM | DIASTOLIC BLOOD PRESSURE: 74 MMHG | HEIGHT: 65 IN | TEMPERATURE: 98.3 F | OXYGEN SATURATION: 98 % | SYSTOLIC BLOOD PRESSURE: 126 MMHG | WEIGHT: 174.7 LBS

## 2025-07-01 DIAGNOSIS — E78.2 MIXED HYPERLIPIDEMIA: ICD-10-CM

## 2025-07-01 DIAGNOSIS — F33.1 MODERATE EPISODE OF RECURRENT MAJOR DEPRESSIVE DISORDER (HCC): ICD-10-CM

## 2025-07-01 DIAGNOSIS — F41.9 ANXIETY: ICD-10-CM

## 2025-07-01 DIAGNOSIS — R73.02 IMPAIRED GLUCOSE TOLERANCE: ICD-10-CM

## 2025-07-01 DIAGNOSIS — T22.291A SECOND DEGREE BURN OF MULTIPLE SITES OF RIGHT SHOULDER AND UPPER EXTREMITY EXCEPT WRIST AND HAND, INITIAL ENCOUNTER: ICD-10-CM

## 2025-07-01 DIAGNOSIS — Z78.0 POSTMENOPAUSAL: ICD-10-CM

## 2025-07-01 DIAGNOSIS — Z12.31 VISIT FOR SCREENING MAMMOGRAM: ICD-10-CM

## 2025-07-01 DIAGNOSIS — Z23 ENCOUNTER FOR IMMUNIZATION: ICD-10-CM

## 2025-07-01 DIAGNOSIS — I10 ESSENTIAL HYPERTENSION: Primary | ICD-10-CM

## 2025-07-01 DIAGNOSIS — E05.90 HYPERTHYROIDISM: ICD-10-CM

## 2025-07-01 PROCEDURE — 99214 OFFICE O/P EST MOD 30 MIN: CPT | Performed by: FAMILY MEDICINE

## 2025-07-01 PROCEDURE — 90715 TDAP VACCINE 7 YRS/> IM: CPT

## 2025-07-01 PROCEDURE — 99396 PREV VISIT EST AGE 40-64: CPT | Performed by: FAMILY MEDICINE

## 2025-07-01 PROCEDURE — 90471 IMMUNIZATION ADMIN: CPT

## 2025-07-01 RX ORDER — VALSARTAN 80 MG/1
80 TABLET ORAL DAILY
Qty: 100 TABLET | Refills: 3 | Status: SHIPPED | OUTPATIENT
Start: 2025-07-01

## 2025-07-01 RX ORDER — ESCITALOPRAM OXALATE 20 MG/1
20 TABLET ORAL DAILY
Qty: 90 TABLET | Refills: 1 | Status: SHIPPED | OUTPATIENT
Start: 2025-07-01
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
## 2025-07-02 NOTE — ASSESSMENT & PLAN NOTE
Continue to follow-up with endocrine.  Continue with the methimazole as per their recommendations.  Continue with ultrasounds as per their recommendations.  Orders:  •  TSH, 3rd generation; Future

## 2025-07-02 NOTE — ASSESSMENT & PLAN NOTE
Slip given for laboratory testing.  Watch diet.  Increase fiber in diet.  Wishes to hold off on cholesterol medication.  Orders:  •  CBC and differential; Future  •  Comprehensive metabolic panel; Future  •  Lipid panel; Future  •  TSH, 3rd generation; Future

## 2025-07-02 NOTE — ASSESSMENT & PLAN NOTE
Anxiety symptoms discussed.  Continue with the Lexapro.  The lorazepam on an as-needed basis.  Watch for any worsening.  Orders:  •  escitalopram (LEXAPRO) 20 mg tablet; Take 1 tablet (20 mg total) by mouth daily  •  CBC and differential; Future

## 2025-07-02 NOTE — ASSESSMENT & PLAN NOTE
Continue with the Lexapro.  Mood has been stable.  Watch for any worsening.    Orders:  •  CBC and differential; Future

## 2025-07-02 NOTE — ASSESSMENT & PLAN NOTE
Continue with the metformin.  Slip given for her routine laboratory testing.  Watch diet.  Watch carbohydrate intake.  Orders:  •  metFORMIN (GLUCOPHAGE) 500 mg tablet; Take 2 tablets (1,000 mg total) by mouth 2 (two) times a day with meals  •  CBC and differential; Future  •  Comprehensive metabolic panel; Future  •  Hemoglobin A1C; Future
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
## 2025-07-02 NOTE — ASSESSMENT & PLAN NOTE
Blood pressure variable.  Discussed risks of intermittently taking blood pressure medication.  Will decrease the dose of her valsartan to 80 mL at grams and advised her to take this on a daily basis.  Continue to follow blood pressures at home.  Stay adequately hydrated.  Orders:  •  valsartan (DIOVAN) 80 mg tablet; Take 1 tablet (80 mg total) by mouth daily  •  CBC and differential; Future  •  Comprehensive metabolic panel; Future